# Patient Record
Sex: FEMALE | Race: BLACK OR AFRICAN AMERICAN | NOT HISPANIC OR LATINO | Employment: FULL TIME | URBAN - METROPOLITAN AREA
[De-identification: names, ages, dates, MRNs, and addresses within clinical notes are randomized per-mention and may not be internally consistent; named-entity substitution may affect disease eponyms.]

---

## 2019-01-05 ENCOUNTER — APPOINTMENT (EMERGENCY)
Dept: RADIOLOGY | Facility: HOSPITAL | Age: 24
End: 2019-01-05

## 2019-01-05 ENCOUNTER — HOSPITAL ENCOUNTER (EMERGENCY)
Facility: HOSPITAL | Age: 24
Discharge: HOME | End: 2019-01-05

## 2019-01-05 VITALS
TEMPERATURE: 98.1 F | BODY MASS INDEX: 27.31 KG/M2 | SYSTOLIC BLOOD PRESSURE: 109 MMHG | RESPIRATION RATE: 17 BRPM | DIASTOLIC BLOOD PRESSURE: 66 MMHG | HEART RATE: 94 BPM | WEIGHT: 160 LBS | HEIGHT: 64 IN | OXYGEN SATURATION: 100 %

## 2019-01-05 DIAGNOSIS — T59.811A SMOKE INHALATION: Primary | ICD-10-CM

## 2019-01-05 LAB
ANION GAP SERPL CALCULATED.3IONS-SCNC: 9 MMOL/L (ref 4–13)
BASE EX.OXY STD BLDV CALC-SCNC: 79.7 % (ref 60–80)
BASE EXCESS BLDV CALC-SCNC: -1.1 MMOL/L
BASOPHILS # BLD AUTO: 0.07 THOUSANDS/ΜL (ref 0–0.1)
BASOPHILS NFR BLD AUTO: 1 % (ref 0–1)
BUN SERPL-MCNC: 12 MG/DL (ref 5–25)
CALCIUM SERPL-MCNC: 9.3 MG/DL (ref 8.3–10.1)
CHLORIDE SERPL-SCNC: 105 MMOL/L (ref 100–108)
CO2 SERPL-SCNC: 26 MMOL/L (ref 21–32)
CREAT SERPL-MCNC: 0.92 MG/DL (ref 0.6–1.3)
EOSINOPHIL # BLD AUTO: 0.15 THOUSAND/ΜL (ref 0–0.61)
EOSINOPHIL NFR BLD AUTO: 2 % (ref 0–6)
ERYTHROCYTE [DISTWIDTH] IN BLOOD BY AUTOMATED COUNT: 15.4 % (ref 11.6–15.1)
GAS + CO PNL BLDA: 0.8 % (ref 0–1.5)
GFR SERPL CREATININE-BSD FRML MDRD: 100 ML/MIN/1.73SQ M
GLUCOSE SERPL-MCNC: 103 MG/DL (ref 65–140)
HCO3 BLDV-SCNC: 24.5 MMOL/L (ref 24–30)
HCT VFR BLD AUTO: 33.4 % (ref 34.8–46.1)
HGB BLD-MCNC: 11.1 G/DL (ref 11.5–15.4)
IMM GRANULOCYTES # BLD AUTO: 0.01 THOUSAND/UL (ref 0–0.2)
IMM GRANULOCYTES NFR BLD AUTO: 0 % (ref 0–2)
LYMPHOCYTES # BLD AUTO: 3.15 THOUSANDS/ΜL (ref 0.6–4.47)
LYMPHOCYTES NFR BLD AUTO: 48 % (ref 14–44)
MCH RBC QN AUTO: 26.1 PG (ref 26.8–34.3)
MCHC RBC AUTO-ENTMCNC: 33.2 G/DL (ref 31.4–37.4)
MCV RBC AUTO: 79 FL (ref 82–98)
MONOCYTES # BLD AUTO: 0.45 THOUSAND/ΜL (ref 0.17–1.22)
MONOCYTES NFR BLD AUTO: 7 % (ref 4–12)
NEUTROPHILS # BLD AUTO: 2.82 THOUSANDS/ΜL (ref 1.85–7.62)
NEUTS SEG NFR BLD AUTO: 42 % (ref 43–75)
NRBC BLD AUTO-RTO: 0 /100 WBCS
O2 CT BLDV-SCNC: 13 ML/DL
PCO2 BLDV: 44.8 MM HG (ref 42–50)
PH BLDV: 7.36 [PH] (ref 7.3–7.4)
PLATELET # BLD AUTO: 279 THOUSANDS/UL (ref 149–390)
PMV BLD AUTO: 10.5 FL (ref 8.9–12.7)
PO2 BLDV: 44.9 MM HG (ref 35–45)
POTASSIUM SERPL-SCNC: 3.6 MMOL/L (ref 3.5–5.3)
RBC # BLD AUTO: 4.25 MILLION/UL (ref 3.81–5.12)
SODIUM SERPL-SCNC: 140 MMOL/L (ref 136–145)
WBC # BLD AUTO: 6.65 THOUSAND/UL (ref 4.31–10.16)

## 2019-01-05 PROCEDURE — 71046 X-RAY EXAM CHEST 2 VIEWS: CPT

## 2019-01-05 PROCEDURE — 85025 COMPLETE CBC W/AUTO DIFF WBC: CPT | Performed by: PHYSICIAN ASSISTANT

## 2019-01-05 PROCEDURE — 99285 EMERGENCY DEPT VISIT HI MDM: CPT

## 2019-01-05 PROCEDURE — 82375 ASSAY CARBOXYHB QUANT: CPT | Performed by: PHYSICIAN ASSISTANT

## 2019-01-05 PROCEDURE — 36415 COLL VENOUS BLD VENIPUNCTURE: CPT | Performed by: PHYSICIAN ASSISTANT

## 2019-01-05 PROCEDURE — 82805 BLOOD GASES W/O2 SATURATION: CPT | Performed by: PHYSICIAN ASSISTANT

## 2019-01-05 PROCEDURE — 80048 BASIC METABOLIC PNL TOTAL CA: CPT | Performed by: PHYSICIAN ASSISTANT

## 2019-01-05 NOTE — DISCHARGE INSTRUCTIONS
Smoke Inhalation   WHAT YOU NEED TO KNOW:   Smoke inhalation is when you breathe in harmful smoke from burning materials and gases  This harmful smoke may contain chemicals or poisons, such as carbon monoxide and cyanide  When you inhale this harmful smoke, your lungs and airways may become irritated, swollen, and blocked  The damaged airways and lungs prevent oxygen from getting into your blood, and respiratory failure may develop  Respiratory failure means you cannot breathe well enough to get oxygen to the cells of your body  DISCHARGE INSTRUCTIONS:   Medicines:   · Bronchodilators: You may need bronchodilators to help open the air passages in your lungs, and help you breathe more easily  · Take your medicine as directed  Contact your healthcare provider if you think your medicine is not helping or if you have side effects  Tell him or her if you are allergic to any medicine  Keep a list of the medicines, vitamins, and herbs you take  Include the amounts, and when and why you take them  Bring the list or the pill bottles to follow-up visits  Carry your medicine list with you in case of an emergency  Follow up with your healthcare provider as directed:  Write down your questions so you remember to ask them during your visits  Self-care:   · Breathing exercises: You may feel short of breath when you are active  The following are breathing exercises that may help you breathe more easily:    ¨ Breathe out with pursed or puckered lips (like playing the trumpet)  ¨ Breathe using your diaphragm  Put one hand on your abdomen and breathe in, causing your hand to move outward or upward  Your lungs will have more room to get bigger and to take in more air  · Deep breathing: This exercise should be done once an hour to keep you from getting a lung infection  Deep breathing opens the tubes going to your lungs  Slowly take a deep breath and hold the breath as long as you can  Then let out your breath   Take 10 deep breaths in a row every hour while awake  You may be asked to use an incentive spirometer to help you with this  Put the plastic piece into your mouth and slowly take a breath as deep and as long as you can  Hold your breath as long as you can  Then, let out your breath  · Oxygen: You may need extra oxygen to help you breathe easier  It may be given through a plastic mask over your mouth and nose  It may be given through a nasal cannula, or prongs, instead of a mask  A nasal cannula is a pair of short, thin tubes that rest just inside your nose  Tell your caregiver if your nose gets dry or if you get redness or sores on your skin  Never smoke or let anyone else smoke in the same room while your oxygen is on  Doing so may cause a fire  · Special positions while sleeping: You may have trouble breathing when lying down  Sleeping in a position with your upper body raised may help you breathe easier  You can use foam wedges or elevate the head of your bed  There are many devices that you can buy to help raise your upper body while in bed  Use a device that will tilt your whole body, or bend your body at the waist  The device should not bend your body at the upper back or neck  Prevent smoke inhalation:   · To prevent fires, make sure that electrical wiring, chimneys, wood stoves, and space heaters are working properly  Use flammable liquids safely and store them in a locked area out of the reach of children  · Do not leave lit cigarettes unattended, and discard them properly  Keep cigarette lighters and matches in a safe place where children cannot reach them  · Make an escape plan in case a fire breaks out in your home  Practice it often with your family  Crawl on the floor to escape a burning building  The air will be cooler and clearer  · Use smoke detectors in your house, and check them regularly to make sure they are working  Contact your healthcare provider if:   · You have a fever  · You have questions or concerns about your condition or care  Seek care immediately or call 911 if:   · You cough up or vomit blood  · You have a fast heartbeat and chest pain  · You have increased shortness of breath  · You have weakness, and pale and clammy skin  · You have wheezing  · Your lips or fingernails turn blue  © 2017 [employer] Information is for End User's use only and may not be sold, redistributed or otherwise used for commercial purposes  All illustrations and images included in CareNotes® are the copyrighted property of [employer]  or [employer]  The above information is an  only  It is not intended as medical advice for individual conditions or treatments  Talk to your doctor, nurse or pharmacist before following any medical regimen to see if it is safe and effective for you  Smoke Inhalation   WHAT YOU NEED TO KNOW:   What is smoke inhalation? Smoke inhalation is when you breathe in harmful smoke from burning materials and gases  This harmful smoke may contain chemicals or poisons, such as carbon monoxide and cyanide  When you inhale this harmful smoke, your lungs and airway may become irritated, swollen, and blocked  The damaged airway and lungs prevent oxygen from getting into your blood, and respiratory failure may develop  Respiratory failure means you cannot breathe well enough to get oxygen to the cells of your body  What causes smoke inhalation? Smoke inhalation most commonly happens when you get trapped inside a burning structure, such as a house, office building, or factory  The harmful chemicals found in smoke may come from burning rubber, coal, plastic, or electrical wiring  Smoke inhalation may also happen if you are near a burning forest   What are the signs and symptoms of smoke inhalation?   The signs and symptoms of smoke inhalation depend on the source of the smoke and how long you were exposed to the smoke:  · Cough and hoarseness    · Chest pain or coughing up blood    · Trouble breathing, such as shortness of breath and noisy breathing    · Headache, abdominal pain, and nausea     · Eye irritation or vision problems    · Fainting    · Soot in your nostrils or throat  How is smoke inhalation diagnosed? Healthcare providers will ask you about the source of the smoke that you inhaled  They will also ask about the amount of time that you were exposed to the smoke  You may need any of the following:  · Blood gases: This is also called an arterial blood gas, or ABG  Blood is taken from an artery (blood vessel) in your wrist, arm, or groin  Your blood is tested for the amount of oxygen and carbon dioxide in it  The results can tell caregivers how well your lungs are working  · Blood tests: You may need blood taken to give caregivers information about how your body is working  The blood may be taken from your hand, arm, or IV  · Bronchoscopy: This is a procedure to look inside your airway and learn the cause of your airway or lung condition  A bronchoscope (thin tube with a light) is inserted into your mouth and moved down your throat to your airway  You may be given medicine to numb your throat and help you relax during the procedure  Tissue and fluid may be collected from your airway or lungs to be tested  · Chest x-ray:  Healthcare providers may use x-rays to look for lung damage and signs of infection, such as pneumonia  · Pulmonary function tests:  Pulmonary function tests (PFTs) help caregivers learn how well your body uses oxygen  You breathe into a mouthpiece connected to a machine  The machine measures how much air you breathe in and out over a certain amount of time  PFTs help your caregivers decide the best treatment for you  · V/Q scan: This is a ventilation (V) and perfusion (Q) test  This test is also called a  scan   A V/Q scan is a two-part test in which pictures of your lungs are taken to look for certain lung problems  During the perfusion part of the test, radioactive dye is put into your vein  Your blood carries the dye to the blood vessels in your lungs  Pictures are taken to see how blood flows in your lungs  During the ventilation part of the test, you breathe in a special gas  Pictures are taken to see how well your lungs take in oxygen  How is smoke inhalation treated? · Antidotes: These are substances that may stop or control the effects of the smoke you inhaled  Healthcare providers may give different antidotes depending on the type of smoke you inhaled  · Bronchodilators: You may need bronchodilators to help open the air passages in your lungs, and help you breathe more easily  · Steroids: Steroid medicine may help to open your air passages so you can breathe easier  · Antibiotics: This medicine is given to help treat or prevent an infection caused by bacteria  · Medicines to treat pain, swelling, or fever: These medicines are safe for most people to use  However, they can cause serious problems when used by people with certain medical conditions  Tell caregivers if you have liver or kidney disease or a history of bleeding in your stomach  What are the risks of smoke inhalation? Smoke inhalation is a serious injury, and treatment is needed as soon as possible  If it is not treated early, the smoke may damage your lungs and cause breathing problems  Your lungs may become infected, swollen, and filled with fluid  Fluid in the lungs causes severe shortness of breath and may lead to respiratory failure  This may affect your heart and brain, and it may be life-threatening  Even with treatment, you may have permanent lung damage  How can smoke inhalation be prevented? · To prevent fires, make sure that electrical wiring, chimneys, wood stoves, and space heaters are working properly   Use flammable liquids safely and store them in a locked area out of the reach of children  · Do not leave lit cigarettes unattended, and discard them properly  Keep cigarette lighters and matches in a safe place where children cannot reach them  · Make an escape plan in case a fire breaks out in your home  Practice it often with your family  Crawl on the floor to escape a burning building  The air will be cooler and   · Use smoke detectors in your house, and check them regularly to make sure they are working  When should I contact my healthcare provider? · You have a fever  · You have questions or concerns about your condition or care  When should I seek immediate care or call 911? · You cough up or vomit blood  · You have a fast heartbeat and chest pain  · You have increased shortness of breath  · You have weakness, and pale and clammy skin  · You are wheezing  · Your lips or fingernails turn blue  CARE AGREEMENT:   You have the right to help plan your care  Learn about your health condition and how it may be treated  Discuss treatment options with your caregivers to decide what care you want to receive  You always have the right to refuse treatment  The above information is an  only  It is not intended as medical advice for individual conditions or treatments  Talk to your doctor, nurse or pharmacist before following any medical regimen to see if it is safe and effective for you  © 2017 [employer] Information is for End User's use only and may not be sold, redistributed or otherwise used for commercial purposes  All illustrations and images included in CareNotes® are the copyrighted property of [employer]  or [employer]
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
## 2019-05-20 ENCOUNTER — HOSPITAL ENCOUNTER (EMERGENCY)
Facility: HOSPITAL | Age: 24
Discharge: HOME | End: 2019-05-20
Admitting: EMERGENCY MEDICINE
Payer: COMMERCIAL

## 2019-05-20 VITALS
HEIGHT: 65 IN | TEMPERATURE: 98.6 F | BODY MASS INDEX: 25.64 KG/M2 | HEART RATE: 71 BPM | RESPIRATION RATE: 18 BRPM | WEIGHT: 153.88 LBS | OXYGEN SATURATION: 98 % | DIASTOLIC BLOOD PRESSURE: 55 MMHG | SYSTOLIC BLOOD PRESSURE: 136 MMHG

## 2019-05-20 DIAGNOSIS — M79.89 LEG SWELLING: ICD-10-CM

## 2019-05-20 DIAGNOSIS — N39.0 UTI (URINARY TRACT INFECTION): ICD-10-CM

## 2019-05-20 DIAGNOSIS — E87.6 HYPOKALEMIA: Primary | ICD-10-CM

## 2019-05-20 LAB
ANION GAP BLD CALC-SCNC: 17 MMOL/L (ref 4–13)
BACTERIA UR QL AUTO: ABNORMAL /HPF
BILIRUB UR QL STRIP: NEGATIVE
BUN BLD-MCNC: 7 MG/DL (ref 5–25)
CA-I BLD-SCNC: 1.15 MMOL/L (ref 1.12–1.32)
CHLORIDE BLD-SCNC: 103 MMOL/L (ref 100–108)
CLARITY UR: ABNORMAL
COLOR UR: YELLOW
CREAT BLD-MCNC: 0.7 MG/DL (ref 0.6–1.3)
EXT PREG TEST URINE: NEGATIVE
GFR SERPL CREATININE-BSD FRML MDRD: 139 ML/MIN/1.73SQ M
GLUCOSE SERPL-MCNC: 92 MG/DL (ref 65–140)
GLUCOSE UR STRIP-MCNC: NEGATIVE MG/DL
HCT VFR BLD CALC: 35 % (ref 34.8–46.1)
HGB BLDA-MCNC: 11.9 G/DL (ref 11.5–15.4)
HGB UR QL STRIP.AUTO: ABNORMAL
KETONES UR STRIP-MCNC: NEGATIVE MG/DL
LEUKOCYTE ESTERASE UR QL STRIP: ABNORMAL
NITRITE UR QL STRIP: POSITIVE
NON-SQ EPI CELLS URNS QL MICRO: ABNORMAL /HPF
PCO2 BLD: 26 MMOL/L (ref 21–32)
PH UR STRIP.AUTO: 6 [PH]
POTASSIUM BLD-SCNC: 3.1 MMOL/L (ref 3.5–5.3)
PROT UR STRIP-MCNC: NEGATIVE MG/DL
RBC #/AREA URNS AUTO: ABNORMAL /HPF
SODIUM BLD-SCNC: 142 MMOL/L (ref 136–145)
SP GR UR STRIP.AUTO: 1.02 (ref 1–1.03)
SPECIMEN SOURCE: ABNORMAL
UROBILINOGEN UR QL STRIP.AUTO: 0.2 E.U./DL
WBC #/AREA URNS AUTO: ABNORMAL /HPF

## 2019-05-20 PROCEDURE — 87186 SC STD MICRODIL/AGAR DIL: CPT | Performed by: PHYSICIAN ASSISTANT

## 2019-05-20 PROCEDURE — 80047 BASIC METABLC PNL IONIZED CA: CPT

## 2019-05-20 PROCEDURE — 99283 EMERGENCY DEPT VISIT LOW MDM: CPT

## 2019-05-20 PROCEDURE — 99283 EMERGENCY DEPT VISIT LOW MDM: CPT | Performed by: PHYSICIAN ASSISTANT

## 2019-05-20 PROCEDURE — 87086 URINE CULTURE/COLONY COUNT: CPT | Performed by: PHYSICIAN ASSISTANT

## 2019-05-20 PROCEDURE — 85014 HEMATOCRIT: CPT

## 2019-05-20 PROCEDURE — 81025 URINE PREGNANCY TEST: CPT | Performed by: PHYSICIAN ASSISTANT

## 2019-05-20 PROCEDURE — 87077 CULTURE AEROBIC IDENTIFY: CPT | Performed by: PHYSICIAN ASSISTANT

## 2019-05-20 PROCEDURE — 81001 URINALYSIS AUTO W/SCOPE: CPT | Performed by: PHYSICIAN ASSISTANT

## 2019-05-20 RX ORDER — POTASSIUM CHLORIDE 20 MEQ/1
40 TABLET, EXTENDED RELEASE ORAL ONCE
Status: COMPLETED | OUTPATIENT
Start: 2019-05-20 | End: 2019-05-20

## 2019-05-20 RX ORDER — CEPHALEXIN 500 MG/1
500 CAPSULE ORAL EVERY 12 HOURS SCHEDULED
Qty: 14 CAPSULE | Refills: 0 | Status: SHIPPED | OUTPATIENT
Start: 2019-05-20 | End: 2019-05-27

## 2019-05-20 RX ADMIN — POTASSIUM CHLORIDE 40 MEQ: 1500 TABLET, EXTENDED RELEASE ORAL at 17:49

## 2019-05-22 LAB — BACTERIA UR CULT: ABNORMAL

## 2019-10-09 ENCOUNTER — OFFICE VISIT (OUTPATIENT)
Dept: OBGYN CLINIC | Age: 24
End: 2019-10-09
Payer: COMMERCIAL

## 2019-10-09 VITALS
BODY MASS INDEX: 25.99 KG/M2 | HEIGHT: 65 IN | WEIGHT: 156 LBS | SYSTOLIC BLOOD PRESSURE: 112 MMHG | DIASTOLIC BLOOD PRESSURE: 64 MMHG

## 2019-10-09 DIAGNOSIS — N91.1 AMENORRHEA, SECONDARY: Primary | ICD-10-CM

## 2019-10-09 PROCEDURE — 76817 TRANSVAGINAL US OBSTETRIC: CPT | Performed by: STUDENT IN AN ORGANIZED HEALTH CARE EDUCATION/TRAINING PROGRAM

## 2019-10-09 PROCEDURE — 99202 OFFICE O/P NEW SF 15 MIN: CPT | Performed by: STUDENT IN AN ORGANIZED HEALTH CARE EDUCATION/TRAINING PROGRAM
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
## 2019-10-31 ENCOUNTER — HOSPITAL ENCOUNTER (EMERGENCY)
Facility: HOSPITAL | Age: 24
Discharge: HOME | End: 2019-10-31
Attending: EMERGENCY MEDICINE
Payer: COMMERCIAL

## 2019-10-31 VITALS
SYSTOLIC BLOOD PRESSURE: 104 MMHG | WEIGHT: 156.09 LBS | BODY MASS INDEX: 25.97 KG/M2 | TEMPERATURE: 98.5 F | OXYGEN SATURATION: 98 % | HEART RATE: 95 BPM | RESPIRATION RATE: 16 BRPM | DIASTOLIC BLOOD PRESSURE: 64 MMHG

## 2019-10-31 DIAGNOSIS — N39.0 UTI (URINARY TRACT INFECTION): Primary | ICD-10-CM

## 2019-10-31 LAB
BACTERIA UR QL AUTO: ABNORMAL /HPF
BILIRUB UR QL STRIP: NEGATIVE
CLARITY UR: CLEAR
COLOR UR: YELLOW
GLUCOSE UR STRIP-MCNC: NEGATIVE MG/DL
HGB UR QL STRIP.AUTO: NEGATIVE
KETONES UR STRIP-MCNC: NEGATIVE MG/DL
LEUKOCYTE ESTERASE UR QL STRIP: ABNORMAL
NITRITE UR QL STRIP: NEGATIVE
NON-SQ EPI CELLS URNS QL MICRO: ABNORMAL /HPF
PH UR STRIP.AUTO: 6 [PH]
PROT UR STRIP-MCNC: NEGATIVE MG/DL
RBC #/AREA URNS AUTO: ABNORMAL /HPF
SP GR UR STRIP.AUTO: <=1.005 (ref 1–1.03)
UROBILINOGEN UR QL STRIP.AUTO: 1 E.U./DL
WBC #/AREA URNS AUTO: ABNORMAL /HPF

## 2019-10-31 PROCEDURE — 99283 EMERGENCY DEPT VISIT LOW MDM: CPT

## 2019-10-31 PROCEDURE — 87077 CULTURE AEROBIC IDENTIFY: CPT | Performed by: EMERGENCY MEDICINE

## 2019-10-31 PROCEDURE — 99283 EMERGENCY DEPT VISIT LOW MDM: CPT | Performed by: EMERGENCY MEDICINE

## 2019-10-31 PROCEDURE — 87186 SC STD MICRODIL/AGAR DIL: CPT | Performed by: EMERGENCY MEDICINE

## 2019-10-31 PROCEDURE — 87086 URINE CULTURE/COLONY COUNT: CPT | Performed by: EMERGENCY MEDICINE

## 2019-10-31 PROCEDURE — 81001 URINALYSIS AUTO W/SCOPE: CPT | Performed by: EMERGENCY MEDICINE

## 2019-10-31 RX ORDER — NITROFURANTOIN 25; 75 MG/1; MG/1
100 CAPSULE ORAL 2 TIMES DAILY
Qty: 10 CAPSULE | Refills: 0 | Status: SHIPPED | OUTPATIENT
Start: 2019-10-31 | End: 2019-11-05

## 2019-10-31 NOTE — ED PROVIDER NOTES
History  Chief Complaint   Patient presents with    Possible UTI     pt with UTI, was treated at [place] [address] but not feeling any better  Pt is approx 12 weeks pregnant      To usual sterile fashion to the emergency depart for evaluation of cloudy urine and some mild dysuria  Patient is about 12 weeks pregnant  Patient was treated about 3 weeks ago with urinary tract infection but this that the symptoms number really fully resolved  Patient was treated with what she believes is Keflex which has a history of resistance to in the past   Patient does follow with OBGYN has not had a appointment yet due to her early gestation  She has no fevers chills, no flank pain  No cramping abdominal pain  No vaginal discharge or bleeding  No pelvic cramping  None       Past Medical History:   Diagnosis Date    Anxiety     Depression     History of blood transfusion        History reviewed  No pertinent surgical history  Family History   Problem Relation Age of Onset    No Known Problems Mother     No Known Problems Father      I have reviewed and agree with the history as documented  Social History     Tobacco Use    Smoking status: Never Smoker    Smokeless tobacco: Never Used   Substance Use Topics    Alcohol use: Not Currently     Comment: socially    Drug use: No        Review of Systems   Constitutional: Negative for appetite change, chills, fatigue and fever  HENT: Negative for sneezing and sore throat  Eyes: Negative for visual disturbance  Respiratory: Negative for cough, choking, chest tightness, shortness of breath and wheezing  Cardiovascular: Negative for chest pain and palpitations  Gastrointestinal: Negative for abdominal pain, constipation, diarrhea, nausea and vomiting  Genitourinary: Positive for dysuria  Negative for difficulty urinating  Neurological: Negative for dizziness, weakness, light-headedness, numbness and headaches     All other systems reviewed and are negative  Physical Exam  Physical Exam   Constitutional: She is oriented to person, place, and time  She appears well-developed and well-nourished  No distress  HENT:   Head: Normocephalic and atraumatic  Mouth/Throat: Oropharynx is clear and moist    Eyes: Pupils are equal, round, and reactive to light  EOM are normal    Neck: No JVD present  No tracheal deviation present  Cardiovascular: Normal rate, regular rhythm, normal heart sounds and intact distal pulses  Exam reveals no gallop and no friction rub  No murmur heard  Pulmonary/Chest: Effort normal and breath sounds normal  No respiratory distress  She has no wheezes  She has no rales  Abdominal: Soft  Bowel sounds are normal  She exhibits no distension  There is tenderness in the suprapubic area  There is no rebound and no guarding  Neurological: She is alert and oriented to person, place, and time  No cranial nerve deficit  She exhibits normal muscle tone  Skin: Skin is warm and dry  She is not diaphoretic  No pallor  Psychiatric: She has a normal mood and affect  Her behavior is normal    Nursing note and vitals reviewed        Vital Signs  ED Triage Vitals [10/31/19 0924]   Temperature Pulse Respirations Blood Pressure SpO2   98 5 °F (36 9 °C) 95 16 104/64 98 %      Temp Source Heart Rate Source Patient Position - Orthostatic VS BP Location FiO2 (%)   Oral Monitor Sitting Right arm --      Pain Score       8           Vitals:    10/31/19 0924   BP: 104/64   Pulse: 95   Patient Position - Orthostatic VS: Sitting         Visual Acuity      ED Medications  Medications - No data to display    Diagnostic Studies  Results Reviewed     Procedure Component Value Units Date/Time    Urine Microscopic [[id number]]  (Abnormal) Collected:  10/31/19 0957    Lab Status:  Final result Specimen:  Urine, Clean Catch Updated:  10/31/19 1042     RBC, UA 1-2 /hpf      WBC, UA 2-4 /hpf      Epithelial Cells Occasional /hpf      Bacteria, UA Moderate /hpf URINE COMMENT --    UA w Reflex to Microscopic w Reflex to Culture [[id number]]  (Abnormal) Collected:  10/31/19 0957    Lab Status:  Final result Specimen:  Urine, Clean Catch Updated:  10/31/19 1017     Color, UA Yellow     Clarity, UA Clear     Specific Gravity, UA <=1 005     pH, UA 6 0     Leukocytes, UA Small     Nitrite, UA Negative     Protein, UA Negative mg/dl      Glucose, UA Negative mg/dl      Ketones, UA Negative mg/dl      Urobilinogen, UA 1 0 E U /dl      Bilirubin, UA Negative     Blood, UA Negative     URINE COMMENT --    Urine culture [[id number]] Collected:  10/31/19 0957    Lab Status: In process Specimen:  Urine, Clean Catch Updated:  10/31/19 1017                 No orders to display              Procedures  Procedures       ED Course  ED Course as of Oct 31 1151   Thu Oct 31, 2019   1114 Will treat for bacteria  Fetal heart tones are 160 beats per minute with gross fetal activity noted on fetal ultrasound  MDM  Number of Diagnoses or Management Options  Diagnosis management comments: 57-year-old female with symptomatic bacteria, will treat with Macrobid given the fact Keflex was ineffective  Will refer to OBGYN for further management  Fetal heart tones are reassuring  Disposition  Final diagnoses:   UTI (urinary tract infection)     Time reflects when diagnosis was documented in both MDM as applicable and the Disposition within this note     Time User Action Codes Description Comment    10/31/2019 11:50 AM Stefan Holliday Add [N39 0] UTI (urinary tract infection)       ED Disposition     ED Disposition Condition Date/Time Comment    Discharge Stable Thu Oct 31, 2019 11:50 AM Sridhar Sharif discharge to home/self care              Follow-up Information    None         Patient's Medications   Discharge Prescriptions    NITROFURANTOIN (MACROBID) 100 MG CAPSULE    Take 1 capsule (100 mg total) by mouth 2 (two) times a day for 5 days       Start Date: 10/31/2019End Date: 11/5/2019       Order Dose: 100 mg       Quantity: 10 capsule    Refills: 0     No discharge procedures on file      ED Provider  Electronically Signed by           Will Esquivel MD  10/31/19 5441

## 2019-10-31 NOTE — ED NOTES
Patient discharged by provider  Patient ambulated out of department, steady gait, vss        Frank Booker RN  10/31/19 1200

## 2019-11-02 LAB — BACTERIA UR CULT: ABNORMAL

## 2019-11-11 ENCOUNTER — TELEPHONE (OUTPATIENT)
Dept: CARDIOLOGY CLINIC | Facility: CLINIC | Age: 24
End: 2019-11-11

## 2019-11-11 ENCOUNTER — ROUTINE PRENATAL (OUTPATIENT)
Dept: OBGYN CLINIC | Age: 24
End: 2019-11-11
Payer: COMMERCIAL

## 2019-11-11 ENCOUNTER — INITIAL PRENATAL (OUTPATIENT)
Dept: OBGYN CLINIC | Age: 24
End: 2019-11-11
Payer: COMMERCIAL

## 2019-11-11 VITALS — DIASTOLIC BLOOD PRESSURE: 78 MMHG | SYSTOLIC BLOOD PRESSURE: 102 MMHG | WEIGHT: 145 LBS | BODY MASS INDEX: 24.13 KG/M2

## 2019-11-11 VITALS
BODY MASS INDEX: 23.82 KG/M2 | HEART RATE: 78 BPM | WEIGHT: 143 LBS | HEIGHT: 65 IN | SYSTOLIC BLOOD PRESSURE: 102 MMHG | DIASTOLIC BLOOD PRESSURE: 64 MMHG

## 2019-11-11 DIAGNOSIS — E63.9 INADEQUATE DIETARY INTAKE: ICD-10-CM

## 2019-11-11 DIAGNOSIS — I05.9: ICD-10-CM

## 2019-11-11 DIAGNOSIS — Z11.3 SCREENING FOR STD (SEXUALLY TRANSMITTED DISEASE): ICD-10-CM

## 2019-11-11 DIAGNOSIS — Z34.81 PRENATAL CARE, SUBSEQUENT PREGNANCY, FIRST TRIMESTER: Primary | ICD-10-CM

## 2019-11-11 DIAGNOSIS — O09.899 HISTORY OF PRETERM DELIVERY, CURRENTLY PREGNANT: ICD-10-CM

## 2019-11-11 DIAGNOSIS — Z34.80 SUPERVISION OF OTHER NORMAL PREGNANCY, ANTEPARTUM: Primary | ICD-10-CM

## 2019-11-11 DIAGNOSIS — O99.411: ICD-10-CM

## 2019-11-11 PROCEDURE — 99213 OFFICE O/P EST LOW 20 MIN: CPT | Performed by: NURSE PRACTITIONER

## 2019-11-11 PROCEDURE — 87591 N.GONORRHOEAE DNA AMP PROB: CPT | Performed by: NURSE PRACTITIONER

## 2019-11-11 PROCEDURE — 87491 CHLMYD TRACH DNA AMP PROBE: CPT | Performed by: NURSE PRACTITIONER

## 2019-11-11 PROCEDURE — G0145 SCR C/V CYTO,THINLAYER,RESCR: HCPCS | Performed by: NURSE PRACTITIONER
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
## 2019-11-11 NOTE — TELEPHONE ENCOUNTER
Please advise, thanks  There is an Echo in care everywhere form 2016  I do no see anything more recently

## 2019-11-11 NOTE — PATIENT INSTRUCTIONS
Pregnancy at [address] to [address]:   What changes are happening in my body? You are now at the end of your first trimester and entering your second trimester  Morning sickness usually goes away by this time  You may have other symptoms such as fatigue, frequent urination, and headaches  You may have gained between 2 to 4 pounds by now  How do I care for myself at this stage of my pregnancy? · Get plenty of rest   You may feel more tired than normal  You may need to take naps or go to bed earlier  · Manage nausea and vomiting  Avoid fatty and spicy foods  Eat small meals throughout the day instead of large meals  Sana may help to decrease nausea  Ask your healthcare provider about other ways of decreasing nausea and vomiting  · Eat a variety of healthy foods  Healthy foods include fruits, vegetables, whole-grain breads, low-fat dairy foods, beans, lean meats, and fish  Drink liquids as directed  Ask how much liquid to drink each day and which liquids are best for you  Limit caffeine to less than 200 milligrams each day  Limit your intake of fish to 2 servings each week  Choose fish low in mercury such as canned light tuna, shrimp, salmon, cod, or tilapia  Do not  eat fish high in mercury such as swordfish, tilefish, aranza mackerel, and shark  · Take prenatal vitamins as directed  Your need for certain vitamins and minerals, such as folic acid, increases during pregnancy  Prenatal vitamins provide some of the extra vitamins and minerals you need  Prenatal vitamins may also help to decrease the risk of certain birth defects  · Do not smoke  If you smoke, it is never too late to quit  Smoking increases your risk of a miscarriage and other health problems during your pregnancy  Smoking can cause your baby to be born too early or weigh less at birth  Ask your healthcare provider for information if you need help quitting  · Do not drink alcohol    Alcohol passes from your body to your baby through the placenta  It can affect your baby's brain development and cause fetal alcohol syndrome (FAS)  FAS is a group of conditions that causes mental, behavior, and growth problems  · Talk to your healthcare provider before you take any medicines  Many medicines may harm your baby if you take them when you are pregnant  Do not take any medicines, vitamins, herbs, or supplements without first talking to your healthcare provider  Never use illegal or street drugs (such as marijuana or cocaine) while you are pregnant  What are some safety tips during pregnancy? · Avoid hot tubs and saunas  Do not use a hot tub or sauna while you are pregnant, especially during your first trimester  Hot tubs and saunas may raise your baby's temperature and increase the risk of birth defects  · Avoid toxoplasmosis  This is an infection caused by eating raw meat or being around infected cat feces  It can cause birth defects, miscarriages, and other problems  Wash your hands after you touch raw meat  Make sure any meat is well-cooked before you eat it  Avoid raw eggs and unpasteurized milk  Use gloves or ask someone else to clean your cat's litter box while you are pregnant  What changes are happening with my baby? Your baby has fully formed fingernails and toenails  Your baby's heartbeat can now be heard  Ask your healthcare provider if you can listen to your baby's heartbeat  By week 14, your baby is over 4 inches long from the top of the head to the rump (baby's bottom)  Your baby weighs over 3 ounces  What do I need to know about prenatal care? During the first 28 weeks of your pregnancy, you will see your healthcare provider once a month  Prenatal care can help prevent problems during pregnancy and childbirth  Your healthcare provider will check your blood pressure and weight  You may also need any of the following:  · A urine test  may also be done to check for sugar and protein   These can be signs of gestational diabetes or infection  · Genetic disorders screening tests  may be offered to you  This screening test checks your baby's risk of genetic disorders such as Down syndrome  The screening test includes a blood test and ultrasound  · Your baby's heart rate  will be checked  When should I seek immediate care? · You have pain or cramping in your abdomen or low back  · You have heavy vaginal bleeding or clotting  · You pass material that looks like tissue or large clots  Collect the material and bring it with you  When should I contact my healthcare provider? · You cannot keep food or drinks down, and you are losing weight  · You have light bleeding  · You have chills or a fever  · You have vaginal itching, burning, or pain  · You have yellow, green, white, or foul-smelling vaginal discharge  · You have pain or burning when you urinate, less urine than usual, or pink or bloody urine  · You have questions or concerns about your condition or care  CARE AGREEMENT:   You have the right to help plan your care  Learn about your health condition and how it may be treated  Discuss treatment options with your caregivers to decide what care you want to receive  You always have the right to refuse treatment  The above information is an  only  It is not intended as medical advice for individual conditions or treatments  Talk to your doctor, nurse or pharmacist before following any medical regimen to see if it is safe and effective for you  © 2017 [employer] Information is for End User's use only and may not be sold, redistributed or otherwise used for commercial purposes  All illustrations and images included in CareNotes® are the copyrighted property of [employer]  or [employer]

## 2019-11-11 NOTE — TELEPHONE ENCOUNTER
Pt was seen today in OBGYN and was referred to Dr Trina Sheehan to be seen ASAP for O99 411, I05 9 (ICD-10-CM) - Mitral valve disorder in pregnancy in first trimester, pt will be a new pt  Please Advise if your able to see pt

## 2019-11-11 NOTE — PROGRESS NOTES
Inadequate dietary intake  She states that she was taking apetamin syrup for weight gain and to increase her appetite  Before pregnancy and stopped when she found out  Reviewed plan for keeping up with small frequent meals, healthy choices, can send referral for nutrition if needed, pt declined today  When she eats, she does keep meals down, but does not have desire or appetite to eat she states  History of  delivery, currently pregnant  Patient had delivery at 32 weeks after PPROM  Referral for MFM given    Mitral valve prolapse of mother during pregnancy  Cardiology referral given  Is not currently under the care of cardiology she states

## 2019-11-11 NOTE — PATIENT INSTRUCTIONS
Pregnancy   AMBULATORY CARE:   What you need to know about pregnancy:  A normal pregnancy lasts about 40 weeks  The first trimester lasts from your last period through the 12th week of pregnancy  The second trimester lasts from the 13th week of your pregnancy through the 23rd week  The third trimester lasts from your 24th week of pregnancy until your baby is born  If you know the date of your last period, your healthcare provider can estimate your due date  You may give birth to your baby any time from 37 weeks to 2 weeks after your due date  Seek care immediately if:   · You develop a severe headache that does not go away  · You have new or increased vision changes, such as blurred or spotted vision  · You have new or increased swelling in your face or hands  · You have pain or cramping in your abdomen or low back  · You have vaginal bleeding  Contact your healthcare provider or obstetrician if:   · You have abdominal cramps, pressure, or tightening  · You have a change in vaginal discharge  · You cannot keep food or drinks down, and you are losing weight  · You have chills or a fever  · You have vaginal itching, burning, or pain  · You have yellow, green, white, or foul-smelling vaginal discharge  · You have pain or burning when you urinate, less urine than usual, or pink or bloody urine  · You have questions or concerns about your condition or care  Body changes that may occur during your pregnancy:   · Breast changes  you will experience include tenderness and tingling during the early part of your pregnancy  Your breasts will become larger  You may need to use a support bra  You may see a thin, yellow fluid, called colostrum, leak from your nipples during the second trimester  Colostrum is a liquid that changes to milk about 3 days after you give birth  · Skin changes and stretch marks  may occur during your pregnancy   You may have red marks, called stretch marks, on your skin  Stretch marks will usually fade after pregnancy  Use lotion if your skin is dry and itchy  The skin on your face, around your nipples, and below your belly button may darken  Most of the time, your skin will return to its normal color after your baby is born  · Morning sickness  is nausea and vomiting that can happen at any time of day  Avoid fatty and spicy foods  Eat small meals throughout the day instead of large meals  Sana may help to decrease nausea  Ask your healthcare provider about other ways of decreasing nausea and vomiting  · Heartburn  may be caused by changes in your hormones during pregnancy  Your growing uterus may also push your stomach upward and force stomach acid to back up into your esophagus  Eat 4 or 5 small meals each day instead of large meals  Avoid spicy foods  Avoid eating right before bedtime  · Constipation  may develop during your pregnancy  To treat constipation, eat foods high in fiber such as fiber cereals, beans, fruits, vegetables, whole-grain breads, and prune juice  Get regular exercise and drink plenty of water  Your healthcare provider may also suggest a fiber supplement to soften your bowel movements  Talk to your healthcare provider before you use any medicines to decrease constipation  · Hemorrhoids  are enlarged veins in the rectal area  They may cause pain, itching, and bright red bleeding from your rectum  To decrease your risk of hemorrhoids, prevent constipation and do not strain to have a bowel movement  If you have hemorrhoids, soak in a tub of warm water to ease discomfort  Ask your healthcare provider how you can treat hemorrhoids  · Leg cramps and swelling  may be caused by low calcium levels or the added weight of pregnancy  Raise your legs above the level of your heart to decrease swelling  During a leg cramp, stretch or massage the muscle that has the cramp  Heat may help decrease pain and muscle spasms   Apply heat on your muscle for 20 to 30 minutes every 2 hours for as many days as directed  · Back pain  may occur as your baby grows  Do not stand for long periods of time or lift heavy items  Use good posture while you stand, squat, or bend  Wear low-heeled shoes with good support  Rest may also help to relieve back pain  Ask your healthcare provider about exercises you can do to strengthen your back muscles  Stay healthy during your pregnancy:   · Eat a variety of healthy foods  Healthy foods include fruits, vegetables, whole-grain breads, low-fat dairy foods, beans, lean meats, and fish  Drink liquids as directed  Ask how much liquid to drink each day and which liquids are best for you  Limit caffeine to less than 200 milligrams each day  Limit your intake of fish to 2 servings each week  Choose fish low in mercury such as canned light tuna, shrimp, crab, salmon, cod, or tilapia  Do not  eat fish high in mercury such as swordfish, tilefish, aranza mackerel, and shark  · Take prenatal vitamins as directed  Your need for certain vitamins and minerals, such as folic acid, increases during pregnancy  Prenatal vitamins provide some of the extra vitamins and minerals you need  Prenatal vitamins may also help to decrease the risk of certain birth defects  · Ask how much weight you should gain during your pregnancy  Too much or too little weight gain can be unhealthy for you and your baby  · Talk to your healthcare provider about exercise  Moderate exercise can help you stay fit  Your healthcare provider will help you plan an exercise program that is safe for you during pregnancy  · Do not smoke  If you smoke, it is never too late to quit  Smoking increases your risk of a miscarriage and other health problems during your pregnancy  Smoking can cause your baby to be born too early or weigh less at birth  Ask your healthcare provider for information if you need help quitting  · Do not drink alcohol    Alcohol passes from your body to your baby through the placenta  It can affect your baby's brain development and cause fetal alcohol syndrome (FAS)  FAS is a group of conditions that causes mental, behavior, and growth problems  · Talk to your healthcare provider before you take any medicines  Many medicines may harm your baby if you take them when you are pregnant  Do not take any medicines, vitamins, herbs, or supplements without first talking to your healthcare provider  Never use illegal or street drugs (such as marijuana or cocaine) while you are pregnant  Safety tips:   · Avoid hot tubs and saunas  Do not use a hot tub or sauna while you are pregnant, especially during your first trimester  Hot tubs and saunas may raise your baby's temperature and increase the risk of birth defects  · Avoid toxoplasmosis  This is an infection caused by eating raw meat or being around infected cat feces  It can cause birth defects, miscarriages, and other problems  Wash your hands after you touch raw meat  Make sure any meat is well-cooked before you eat it  Avoid raw eggs and unpasteurized milk  Use gloves or ask someone else to clean your cat's litter box while you are pregnant  · Ask your healthcare provider about travel  The most comfortable time to travel is during the second trimester  Ask your healthcare provider if you can travel after 36 weeks  You may not be able to travel in an airplane after 36 weeks  He may also recommend that you avoid long road trips  Follow up with your healthcare provider or obstetrician as directed:  Go to all of your prenatal visits during your pregnancy  Write down your questions so you remember to ask them during your visits  © 2017 [employer] Information is for End User's use only and may not be sold, redistributed or otherwise used for commercial purposes   All illustrations and images included in CareNotes® are the copyrighted property of [employer] M [employer]  or [employer]  The above information is an  only  It is not intended as medical advice for individual conditions or treatments  Talk to your doctor, nurse or pharmacist before following any medical regimen to see if it is safe and effective for you

## 2019-11-11 NOTE — ASSESSMENT & PLAN NOTE
She states that she was taking apetamin syrup for weight gain and to increase her appetite  Before pregnancy and stopped when she found out  Reviewed plan for keeping up with small frequent meals, healthy choices, can send referral for nutrition if needed, pt declined today  When she eats, she does keep meals down, but does not have desire or appetite to eat she states

## 2019-11-11 NOTE — PROGRESS NOTES
Pt here for 1st obv  G/C&pap collected today  Pt unable to void  Will try again at end of visit  Pt declining flu vaccine

## 2019-11-12 ENCOUNTER — TELEPHONE (OUTPATIENT)
Dept: CARDIOLOGY CLINIC | Facility: CLINIC | Age: 24
End: 2019-11-12

## 2019-11-12 LAB
C TRACH DNA SPEC QL NAA+PROBE: NEGATIVE
N GONORRHOEA DNA SPEC QL NAA+PROBE: NEGATIVE

## 2019-11-17 LAB
LAB AP GYN PRIMARY INTERPRETATION: NORMAL
LAB AP LMP: NORMAL
Lab: NORMAL

## 2019-11-18 ENCOUNTER — APPOINTMENT (OUTPATIENT)
Dept: LAB | Facility: HOSPITAL | Age: 24
End: 2019-11-18
Payer: COMMERCIAL

## 2019-11-18 ENCOUNTER — TRANSCRIBE ORDERS (OUTPATIENT)
Dept: ADMINISTRATIVE | Facility: HOSPITAL | Age: 24
End: 2019-11-18

## 2019-11-18 DIAGNOSIS — Z34.90 PREGNANCY, UNSPECIFIED GESTATIONAL AGE: ICD-10-CM

## 2019-11-18 DIAGNOSIS — Z34.90 PREGNANCY, UNSPECIFIED GESTATIONAL AGE: Primary | ICD-10-CM

## 2019-11-18 DIAGNOSIS — Z34.81 PRENATAL CARE, SUBSEQUENT PREGNANCY, FIRST TRIMESTER: ICD-10-CM

## 2019-11-18 LAB
ABO GROUP BLD: NORMAL
AMPHETAMINES SERPL QL SCN: NEGATIVE
BACTERIA UR QL AUTO: ABNORMAL /HPF
BARBITURATES UR QL: NEGATIVE
BASOPHILS # BLD AUTO: 0.03 THOUSANDS/ΜL (ref 0–0.1)
BASOPHILS NFR BLD AUTO: 1 % (ref 0–1)
BENZODIAZ UR QL: NEGATIVE
BILIRUB UR QL STRIP: NEGATIVE
BLD GP AB SCN SERPL QL: NEGATIVE
CLARITY UR: ABNORMAL
COCAINE UR QL: NEGATIVE
COLOR UR: YELLOW
EOSINOPHIL # BLD AUTO: 0.06 THOUSAND/ΜL (ref 0–0.61)
EOSINOPHIL NFR BLD AUTO: 1 % (ref 0–6)
ERYTHROCYTE [DISTWIDTH] IN BLOOD BY AUTOMATED COUNT: 15 % (ref 11.6–15.1)
GLUCOSE UR STRIP-MCNC: NEGATIVE MG/DL
HBV SURFACE AG SER QL: NORMAL
HCT VFR BLD AUTO: 34.2 % (ref 34.8–46.1)
HGB BLD-MCNC: 11.4 G/DL (ref 11.5–15.4)
HGB UR QL STRIP.AUTO: ABNORMAL
IMM GRANULOCYTES # BLD AUTO: 0.02 THOUSAND/UL (ref 0–0.2)
IMM GRANULOCYTES NFR BLD AUTO: 0 % (ref 0–2)
KETONES UR STRIP-MCNC: NEGATIVE MG/DL
LEUKOCYTE ESTERASE UR QL STRIP: ABNORMAL
LYMPHOCYTES # BLD AUTO: 1.56 THOUSANDS/ΜL (ref 0.6–4.47)
LYMPHOCYTES NFR BLD AUTO: 26 % (ref 14–44)
MCH RBC QN AUTO: 27.1 PG (ref 26.8–34.3)
MCHC RBC AUTO-ENTMCNC: 33.3 G/DL (ref 31.4–37.4)
MCV RBC AUTO: 81 FL (ref 82–98)
METHADONE UR QL: NEGATIVE
MONOCYTES # BLD AUTO: 0.35 THOUSAND/ΜL (ref 0.17–1.22)
MONOCYTES NFR BLD AUTO: 6 % (ref 4–12)
NEUTROPHILS # BLD AUTO: 3.92 THOUSANDS/ΜL (ref 1.85–7.62)
NEUTS SEG NFR BLD AUTO: 66 % (ref 43–75)
NITRITE UR QL STRIP: NEGATIVE
NON-SQ EPI CELLS URNS QL MICRO: ABNORMAL /HPF
NRBC BLD AUTO-RTO: 0 /100 WBCS
OPIATES UR QL SCN: NEGATIVE
PCP UR QL: NEGATIVE
PH UR STRIP.AUTO: 5.5 [PH]
PLATELET # BLD AUTO: 290 THOUSANDS/UL (ref 149–390)
PMV BLD AUTO: 10.8 FL (ref 8.9–12.7)
PROT UR STRIP-MCNC: ABNORMAL MG/DL
RBC # BLD AUTO: 4.21 MILLION/UL (ref 3.81–5.12)
RBC #/AREA URNS AUTO: ABNORMAL /HPF
RH BLD: POSITIVE
RUBV IGG SERPL IA-ACNC: 116.7 IU/ML
SP GR UR STRIP.AUTO: 1.02 (ref 1–1.03)
SPECIMEN EXPIRATION DATE: NORMAL
THC UR QL: NEGATIVE
UROBILINOGEN UR QL STRIP.AUTO: 1 E.U./DL
WBC # BLD AUTO: 5.94 THOUSAND/UL (ref 4.31–10.16)
WBC #/AREA URNS AUTO: ABNORMAL /HPF

## 2019-11-18 PROCEDURE — 81001 URINALYSIS AUTO W/SCOPE: CPT

## 2019-11-18 PROCEDURE — 80307 DRUG TEST PRSMV CHEM ANLYZR: CPT

## 2019-11-18 PROCEDURE — 87186 SC STD MICRODIL/AGAR DIL: CPT

## 2019-11-18 PROCEDURE — 36415 COLL VENOUS BLD VENIPUNCTURE: CPT

## 2019-11-18 PROCEDURE — 80081 OBSTETRIC PANEL INC HIV TSTG: CPT

## 2019-11-18 PROCEDURE — 87086 URINE CULTURE/COLONY COUNT: CPT

## 2019-11-18 NOTE — PATIENT INSTRUCTIONS
Thank you for choosing us for your  care today  If you have any questions about your ultrasound or care, please do not hesitate to contact us or your primary obstetrician  Some general instructions for your pregnancy are:     Exercise: we encourage most pregnant women to get regular physical activity in pregnancy  Exercise has been shown to reduce the risk of several pregnancy-related complications  Unless instructed otherwise, you can aim for 22 minutes per day (150 minutes per week! )   Nutrition: aim for calcium-rich and iron-rich foods as well as healthy sources of protein   Weight: ask your doctor what is the appropriate amount of weight for you to gain in pregnancy  We have nutritionists here if you would like to meet with them   Protection from influenza: get yourself and your entire household vaccinated against influenza  Tell your partner to get vaccinated as well  Good hand hygiene can reduce the spread of this potentially deadly virus  Insist that everyone who is going to hold or be around your baby get vaccinated   Educate yourself about preeclampsia: preeclampsia is a common, serious complication in pregnancy  A blood pressure of 140mmHg (top number or systolic) OR 61RCQL (bottom number or diastolic) is elevated and needs evaluation by your doctor  Ask your doctor early in pregnancy if you should take aspirin (not motrin or tylenol) to prevent preeclampsia  If you were advised to take aspirin to prevent preeclampsia, a daily dose of 162mg or 81mg is advised  One resource to learn more is www  preeclampsia org    If you smoke, try to reduce how many cigarettes you smoke or quit completely  Do not vape       Other warning signs to watch out for in pregnancy or postpartum: chest pain, obstructed breathing or shortness of breath, seizures, thoughts of hurting yourself or your baby, bleeding, a painful or swollen leg, fever, or headache ([facility] POST-BIRTH Warning Signs campaign)  If these happen call 911  Itching is also not normal in pregnancy and if you experience this, especially over your hands and feet, potentially worse at night, notify your doctors

## 2019-11-19 ENCOUNTER — ROUTINE PRENATAL (OUTPATIENT)
Dept: PERINATAL CARE | Facility: OTHER | Age: 24
End: 2019-11-19
Payer: COMMERCIAL

## 2019-11-19 VITALS
SYSTOLIC BLOOD PRESSURE: 109 MMHG | HEIGHT: 65 IN | WEIGHT: 146.6 LBS | DIASTOLIC BLOOD PRESSURE: 65 MMHG | HEART RATE: 96 BPM | BODY MASS INDEX: 24.43 KG/M2

## 2019-11-19 DIAGNOSIS — Z36.82 ENCOUNTER FOR ANTENATAL SCREENING FOR NUCHAL TRANSLUCENCY: ICD-10-CM

## 2019-11-19 DIAGNOSIS — O09.899 HISTORY OF PRETERM DELIVERY, CURRENTLY PREGNANT: Primary | ICD-10-CM

## 2019-11-19 DIAGNOSIS — O09.299 PREGNANCY WITH POOR OBSTETRIC HISTORY: ICD-10-CM

## 2019-11-19 DIAGNOSIS — Z3A.13 13 WEEKS GESTATION OF PREGNANCY: ICD-10-CM

## 2019-11-19 LAB
HIV 1+2 AB+HIV1 P24 AG SERPL QL IA: NORMAL
RPR SER QL: NORMAL

## 2019-11-19 PROCEDURE — 76813 OB US NUCHAL MEAS 1 GEST: CPT | Performed by: OBSTETRICS & GYNECOLOGY

## 2019-11-19 PROCEDURE — 99242 OFF/OP CONSLTJ NEW/EST SF 20: CPT | Performed by: OBSTETRICS & GYNECOLOGY

## 2019-11-19 NOTE — PROGRESS NOTES
[address]: Ms Clark Evans was seen today at 13w3d for nuchal translucency ultrasound  See ultrasound report under "OB Procedures" tab  Please don't hesitate to contact our office with any concerns or questions    Carmela Pang MD

## 2019-11-19 NOTE — LETTER
November 19, 2019     [address]  [address] [address]    Patient: Maikel Crowley   YOB: 1995   Date of Visit: 11/19/2019       Dear Dr Logan Bowers: Thank you for referring Maikel Crowley to me for evaluation  Below are my notes for this consultation  If you have questions, please do not hesitate to call me  I look forward to following your patient along with you  Sincerely,        Grace Rosario MD        CC: No Recipients  Grace Rosario MD  11/19/2019 12:10 PM  Sign at close encounter  [address]: Ms Graham Ridley was seen today at 13w3d for nuchal translucency ultrasound  See ultrasound report under "OB Procedures" tab  Please don't hesitate to contact our office with any concerns or questions    Grace Rosario MD

## 2019-11-21 LAB — BACTERIA UR CULT: ABNORMAL

## 2019-11-22 ENCOUNTER — TELEPHONE (OUTPATIENT)
Dept: OBGYN CLINIC | Facility: CLINIC | Age: 24
End: 2019-11-22

## 2019-11-22 DIAGNOSIS — O23.40 URINARY TRACT INFECTION IN MOTHER DURING PREGNANCY, ANTEPARTUM: Primary | ICD-10-CM

## 2019-11-22 RX ORDER — AMOXICILLIN AND CLAVULANATE POTASSIUM 875; 125 MG/1; MG/1
1 TABLET, FILM COATED ORAL EVERY 12 HOURS SCHEDULED
Qty: 10 TABLET | Refills: 0 | Status: SHIPPED | OUTPATIENT
Start: 2019-11-22 | End: 2019-11-27

## 2019-11-22 NOTE — TELEPHONE ENCOUNTER
----- Message from Jaylan Vuong, [address] sent at 11/22/2019  9:13 AM EST -----  Please let pt know that urine culture is + UTI, so will treat  Rx sent for augmentin  Also will repeat urine culture 3 days after completing last dose  Order in chart  Thanks!

## 2019-11-26 ENCOUNTER — CONSULT (OUTPATIENT)
Dept: CARDIOLOGY CLINIC | Facility: CLINIC | Age: 24
End: 2019-11-26
Payer: COMMERCIAL

## 2019-11-26 ENCOUNTER — HOSPITAL ENCOUNTER (OUTPATIENT)
Dept: NON INVASIVE DIAGNOSTICS | Facility: CLINIC | Age: 24
Discharge: HOME | End: 2019-11-26
Payer: COMMERCIAL

## 2019-11-26 VITALS
DIASTOLIC BLOOD PRESSURE: 66 MMHG | SYSTOLIC BLOOD PRESSURE: 112 MMHG | BODY MASS INDEX: 24.83 KG/M2 | HEART RATE: 104 BPM | WEIGHT: 149 LBS | OXYGEN SATURATION: 100 % | HEIGHT: 65 IN | RESPIRATION RATE: 18 BRPM

## 2019-11-26 DIAGNOSIS — Z76.89 ENCOUNTER TO ESTABLISH CARE: Primary | ICD-10-CM

## 2019-11-26 DIAGNOSIS — O99.411: ICD-10-CM

## 2019-11-26 DIAGNOSIS — R00.2 PALPITATIONS: ICD-10-CM

## 2019-11-26 DIAGNOSIS — I05.9: ICD-10-CM

## 2019-11-26 PROCEDURE — 93308 TTE F-UP OR LMTD: CPT | Performed by: INTERNAL MEDICINE

## 2019-11-26 PROCEDURE — 93000 ELECTROCARDIOGRAM COMPLETE: CPT | Performed by: INTERNAL MEDICINE

## 2019-11-26 PROCEDURE — 93321 DOPPLER ECHO F-UP/LMTD STD: CPT | Performed by: INTERNAL MEDICINE

## 2019-11-26 PROCEDURE — 93325 DOPPLER ECHO COLOR FLOW MAPG: CPT | Performed by: INTERNAL MEDICINE

## 2019-11-26 PROCEDURE — 93306 TTE W/DOPPLER COMPLETE: CPT

## 2019-11-26 PROCEDURE — 99243 OFF/OP CNSLTJ NEW/EST LOW 30: CPT | Performed by: INTERNAL MEDICINE
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
## 2019-11-27 ENCOUNTER — TELEPHONE (OUTPATIENT)
Dept: CARDIOLOGY CLINIC | Facility: CLINIC | Age: 24
End: 2019-11-27

## 2019-11-27 NOTE — TELEPHONE ENCOUNTER
----- Message from Matilda Nova PA-C sent at 11/27/2019 11:48 AM EST -----  pls call echo was the same as last time

## 2019-12-03 ENCOUNTER — TELEPHONE (OUTPATIENT)
Dept: OBGYN CLINIC | Facility: CLINIC | Age: 24
End: 2019-12-03

## 2019-12-03 DIAGNOSIS — R51.9 PREGNANCY HEADACHE IN SECOND TRIMESTER: Primary | ICD-10-CM

## 2019-12-03 DIAGNOSIS — O26.892 PREGNANCY HEADACHE IN SECOND TRIMESTER: Primary | ICD-10-CM

## 2019-12-03 RX ORDER — BUTALBITAL, ACETAMINOPHEN AND CAFFEINE 50; 325; 40 MG/1; MG/1; MG/1
1 TABLET ORAL EVERY 4 HOURS PRN
Qty: 3 TABLET | Refills: 0 | Status: SHIPPED | OUTPATIENT
Start: 2019-12-03 | End: 2020-02-03 | Stop reason: ALTCHOICE

## 2019-12-03 RX ORDER — BUTALBITAL, ACETAMINOPHEN AND CAFFEINE 50; 325; 40 MG/1; MG/1; MG/1
1 TABLET ORAL EVERY 4 HOURS PRN
Qty: 3 TABLET | Refills: 0 | Status: SHIPPED | OUTPATIENT
Start: 2019-12-03 | End: 2019-12-03

## 2019-12-03 NOTE — TELEPHONE ENCOUNTER
Patient is 16 weeks pregnant and is having bad headaches  Patient states she had taken tylenol and it didn't help  Patient would like to know what else she can try  Please advise

## 2019-12-03 NOTE — TELEPHONE ENCOUNTER
Called patient and discussed her HA  She has had intermittent HA in her pregnancy and the last couple of days it has been worse  She has a decrease in her discomfort with tylenol but no resolution  She is eating and drinking okay but does report some nausea  We discussed she can come to the ER for evaluation and HA cocktail or we can trial a dose of fioricet at home and if no resolution she needs to present to the ER  She is motivated and comfortable with trial of oral fioricet at home and understands to present if she has worsening or no resolution of symptoms

## 2019-12-03 NOTE — TELEPHONE ENCOUNTER
Dear Dr Ni Cast:    OB Pt, Merary Tyson (: 89), called office today c/o consistent headache pain that she rates as an "8" at this time with regards to pain scale  Pt's GONZALES is 20, GA 15 weeks + 3 days,  4 Para 3, SAB 0 history, PPROM history, blood type = O+, blood pressure = 102/78 on 19  Pt reports she has been experiencing inconsistent headaches for approximately two weeks now  Pt further reports she has been taking OTC Tylenol and taking "naps", however, Tylenol does not alleviate headache pain  Pt states she has migraine headache history as a teenager  Pt further states she feels nauseous during headache episode  Pt states "she had issues with low blood pressure approximately 4 years ago "  Pt denies nausea, vomiting, and distorted vision/auras at this time  Please advise  Thank you!     Tess Wil, MA

## 2019-12-08 ENCOUNTER — HOSPITAL ENCOUNTER (EMERGENCY)
Facility: HOSPITAL | Age: 24
Discharge: HOME | End: 2019-12-08
Attending: EMERGENCY MEDICINE
Payer: COMMERCIAL

## 2019-12-08 VITALS
SYSTOLIC BLOOD PRESSURE: 120 MMHG | WEIGHT: 153 LBS | RESPIRATION RATE: 16 BRPM | DIASTOLIC BLOOD PRESSURE: 59 MMHG | HEART RATE: 104 BPM | TEMPERATURE: 98.7 F | OXYGEN SATURATION: 100 % | BODY MASS INDEX: 25.46 KG/M2

## 2019-12-08 DIAGNOSIS — O99.891 ASYMPTOMATIC BACTERIURIA DURING PREGNANCY: ICD-10-CM

## 2019-12-08 DIAGNOSIS — R82.71 ASYMPTOMATIC BACTERIURIA DURING PREGNANCY: ICD-10-CM

## 2019-12-08 DIAGNOSIS — R51.9 ACUTE NONINTRACTABLE HEADACHE, UNSPECIFIED HEADACHE TYPE: Primary | ICD-10-CM

## 2019-12-08 DIAGNOSIS — O23.40 URINARY TRACT INFECTION IN MOTHER DURING PREGNANCY, ANTEPARTUM: ICD-10-CM

## 2019-12-08 LAB
BACTERIA UR QL AUTO: ABNORMAL /HPF
BILIRUB UR QL STRIP: NEGATIVE
CLARITY UR: CLEAR
COLOR UR: YELLOW
GLUCOSE UR STRIP-MCNC: NEGATIVE MG/DL
HGB UR QL STRIP.AUTO: NEGATIVE
KETONES UR STRIP-MCNC: NEGATIVE MG/DL
LEUKOCYTE ESTERASE UR QL STRIP: ABNORMAL
NITRITE UR QL STRIP: NEGATIVE
NON-SQ EPI CELLS URNS QL MICRO: ABNORMAL /HPF
PH UR STRIP.AUTO: 6 [PH]
PROT UR STRIP-MCNC: NEGATIVE MG/DL
RBC #/AREA URNS AUTO: ABNORMAL /HPF
SP GR UR STRIP.AUTO: 1.02 (ref 1–1.03)
UROBILINOGEN UR QL STRIP.AUTO: 1 E.U./DL
WBC #/AREA URNS AUTO: ABNORMAL /HPF

## 2019-12-08 PROCEDURE — 87077 CULTURE AEROBIC IDENTIFY: CPT

## 2019-12-08 PROCEDURE — 87186 SC STD MICRODIL/AGAR DIL: CPT

## 2019-12-08 PROCEDURE — 99284 EMERGENCY DEPT VISIT MOD MDM: CPT | Performed by: EMERGENCY MEDICINE

## 2019-12-08 PROCEDURE — 96374 THER/PROPH/DIAG INJ IV PUSH: CPT

## 2019-12-08 PROCEDURE — 99283 EMERGENCY DEPT VISIT LOW MDM: CPT

## 2019-12-08 PROCEDURE — 96361 HYDRATE IV INFUSION ADD-ON: CPT

## 2019-12-08 PROCEDURE — 87086 URINE CULTURE/COLONY COUNT: CPT

## 2019-12-08 PROCEDURE — 81001 URINALYSIS AUTO W/SCOPE: CPT | Performed by: EMERGENCY MEDICINE

## 2019-12-08 RX ORDER — CEPHALEXIN 250 MG/1
500 CAPSULE ORAL EVERY 12 HOURS SCHEDULED
Qty: 28 CAPSULE | Refills: 0 | Status: SHIPPED | OUTPATIENT
Start: 2019-12-08 | End: 2019-12-09 | Stop reason: ALTCHOICE

## 2019-12-08 RX ORDER — METOCLOPRAMIDE HYDROCHLORIDE 5 MG/ML
10 INJECTION INTRAMUSCULAR; INTRAVENOUS ONCE
Status: COMPLETED | OUTPATIENT
Start: 2019-12-08 | End: 2019-12-08

## 2019-12-08 RX ORDER — ACETAMINOPHEN 325 MG/1
975 TABLET ORAL ONCE
Status: COMPLETED | OUTPATIENT
Start: 2019-12-08 | End: 2019-12-08

## 2019-12-08 RX ORDER — DEXTROSE AND SODIUM CHLORIDE 5; .45 G/100ML; G/100ML
1000 INJECTION, SOLUTION INTRAVENOUS CONTINUOUS
Status: DISPENSED | OUTPATIENT
Start: 2019-12-08 | End: 2019-12-08

## 2019-12-08 RX ADMIN — METOCLOPRAMIDE 10 MG: 5 INJECTION, SOLUTION INTRAMUSCULAR; INTRAVENOUS at 13:31

## 2019-12-08 RX ADMIN — DEXTROSE AND SODIUM CHLORIDE 1000 ML: 5; .45 INJECTION, SOLUTION INTRAVENOUS at 13:31

## 2019-12-08 RX ADMIN — ACETAMINOPHEN 975 MG: 325 TABLET, FILM COATED ORAL at 14:21

## 2019-12-08 NOTE — ED PROVIDER NOTES
Pt Name: Stefania Pearl  MRN: [id number]  [place] 1995  Age/Sex: 20 y o  female  Date of evaluation: 12/8/2019  PCP: Natalee Leija    Chief Complaint   Patient presents with    Migraine     Pt  reports on and off again mirgraine x1 week  P t reports taking tylenol without relief  Pt  states light sensitivity  HPI    20 y o  female presenting with 2 weeks of intermittent headache  Patient states she has been taking Tylenol with minimal relief over that time  The headache was gradual in onset, consistent with past migraines, is currently severe, radiating throughout the head, worse with light or noise, better at rest   She denies trauma, fever, chest pain, shortness of breath, other symptoms  Patient does note nausea as well as poor intake of food and fluids over the last 2 weeks      HPI      Past Medical and Surgical History    Past Medical History:   Diagnosis Date    Abnormal Pap smear of cervix 2017    Anemia     Anxiety     Depression     History of blood transfusion 2016    x2 units    Miscarriage 2010    16wk    Mitral valve prolapse 2016    Post partum depression 2016    she states she gave her mother custody of this son    Sickle cell trait ([facility] [address]     Urinary tract infection        Past Surgical History:   Procedure Laterality Date    DILATION AND EVACUATION  2010    16wk-       Family History   Problem Relation Age of Onset    No Known Problems Mother     Depression Father     Anxiety disorder Father     Sickle cell anemia Father     Sickle cell trait Sister     Depression Sister     Depression Brother     Anxiety disorder Brother     Bipolar disorder Brother     Drug abuse Brother     No Known Problems Son     Diabetes Maternal Grandmother     Hypertension Maternal Grandmother     Hypertension Maternal Grandfather     Hypertension Paternal Grandmother     Sickle cell anemia Paternal Grandmother     Sickle cell anemia Paternal Grandfather     Sickle cell trait Sister     Depression Sister     Sickle cell trait Sister     Depression Sister     Sickle cell trait Sister     Depression Sister     Sickle cell trait Brother     Sickle cell trait Brother     Sickle cell trait Brother     Anxiety disorder Brother     Sickle cell trait Son     Premature birth Son         32wks     Developmental delay Son        Social History     Tobacco Use    Smoking status: Former Smoker    Smokeless tobacco: Never Used   Substance Use Topics    Alcohol use: Yes     Frequency: 2-4 times a month     Drinks per session: 5 or 6     Comment: weekends, still continues to drink wine on occasion during pregnancy educated     Drug use: Never           Allergies    No Known Allergies    Home Medications    Prior to Admission medications    Medication Sig Start Date End Date Taking? Authorizing Provider   butalbital-acetaminophen-caffeine (FIORICET,ESGIC) -40 mg per tablet Take 1 tablet by mouth every 4 (four) hours as needed for headaches 12/3/19   Fidel Haines MD           Review of Systems    Review of Systems   Constitutional: Negative for activity change, chills and fever  HENT: Negative for drooling and facial swelling  Eyes: Positive for photophobia  Negative for pain, discharge and visual disturbance  Respiratory: Negative for apnea, cough, chest tightness, shortness of breath and wheezing  Cardiovascular: Negative for chest pain and leg swelling  Gastrointestinal: Negative for abdominal pain, constipation, diarrhea, nausea and vomiting  Genitourinary: Negative for difficulty urinating, dysuria and urgency  Musculoskeletal: Negative for arthralgias, back pain and gait problem  Skin: Negative for color change and rash  Neurological: Positive for headaches  Negative for dizziness, speech difficulty and weakness  Psychiatric/Behavioral: Negative for agitation, behavioral problems and confusion             All other systems reviewed and negative  Physical Exam      ED Triage Vitals   Temperature Pulse Respirations Blood Pressure SpO2   12/08/19 1301 12/08/19 1301 12/08/19 1301 12/08/19 1301 12/08/19 1301   98 7 °F (37 1 °C) 104 16 120/59 100 %      Temp Source Heart Rate Source Patient Position - Orthostatic VS BP Location FiO2 (%)   12/08/19 1301 12/08/19 1301 12/08/19 1301 -- --   Oral Monitor Lying        Pain Score       12/08/19 1303       8               Physical Exam   Constitutional: She is oriented to person, place, and time  She appears well-developed and well-nourished  HENT:   Head: Normocephalic and atraumatic  Eyes: Pupils are equal, round, and reactive to light  Conjunctivae and EOM are normal    Neck: Normal range of motion  Neck supple  Cardiovascular: Normal rate, regular rhythm, normal heart sounds and intact distal pulses  Pulmonary/Chest: Effort normal and breath sounds normal  No respiratory distress  She has no wheezes  She has no rales  Abdominal: Soft  She exhibits no distension  There is no tenderness  There is no rebound and no guarding  Musculoskeletal: Normal range of motion  She exhibits no edema or deformity  Neurological: She is alert and oriented to person, place, and time  She displays normal reflexes  No cranial nerve deficit or sensory deficit  She exhibits normal muscle tone  Coordination normal    Cranial nerves 2-12 intact, 5/5 strength in all extremities, normal speech and coordination   Skin: Skin is warm and dry  No rash noted  No erythema  Psychiatric: She has a normal mood and affect  Her behavior is normal  Judgment and thought content normal    Nursing note and vitals reviewed  Diagnostic Results      Labs:    Results Reviewed     Procedure Component Value Units Date/Time    Urine culture [[id number]] Collected:  12/08/19 1314    Lab Status:   In process Specimen:  Urine, Clean Catch Updated:  12/08/19 2014    Urine culture [[id number]]     Lab Status:  No result Specimen: Urine     Urinalysis with microscopic [[id number]]  (Abnormal) Collected:  12/08/19 1339    Lab Status:  Final result Specimen:  Urine, Clean Catch Updated:  12/08/19 1353     Clarity, UA Clear     Color, UA Yellow     Specific Gravity, UA 1 020     pH, UA 6 0     Glucose, UA Negative mg/dl      Ketones, UA Negative mg/dl      Blood, UA Negative     Protein, UA Negative mg/dl      Nitrite, UA Negative     Bilirubin, UA Negative     Urobilinogen, UA 1 0 E U /dl      Leukocytes, UA Small     WBC, UA 4-10 /hpf      RBC, UA None Seen /hpf      Bacteria, UA Moderate /hpf      Epithelial Cells Occasional /hpf           All labs reviewed and utilized in the medical decision making process    Radiology:    No orders to display       All radiology studies independently viewed by me and interpreted by the radiologist     Procedure    Procedures        ED Course of Care and Re-Assessments      Symptoms resolved IV dextrose, Reglan, Tylenol  Patient started on Keflex for asymptomatic bacteriuria during pregnancy  Medications   dextrose 5 % and sodium chloride 0 45 % infusion (0 mL Intravenous Stopped 12/8/19 1524)   metoclopramide (REGLAN) injection 10 mg (10 mg Intravenous Given 12/8/19 1331)   acetaminophen (TYLENOL) tablet 975 mg (975 mg Oral Given 12/8/19 1421)           FINAL IMPRESSION    Final diagnoses:   Acute nonintractable headache, unspecified headache type   Asymptomatic bacteriuria during pregnancy         DISPOSITION/PLAN    Headache and the bacteriuria as above  Based on history of poor p o  Intake as well as resolution with treatment as above, some suspicion for ketosis or inadequate intake as cause of headache  Very low suspicion for intracranial hemorrhage, sepsis, meningitis, venous sinus thrombosis, other acute life threat  Treated symptomatically and discharged with strict return precautions, also treated for asymptomatic bacteriuria    Hemodynamically stable and comfortable at time of discharge  Time reflects when diagnosis was documented in both MDM as applicable and the Disposition within this note     Time User Action Codes Description Comment    12/8/2019  2:56 PM Jen Shivani SELLERS Add [R51] Acute nonintractable headache, unspecified headache type     12/8/2019  2:57 PM Jen Shivani SELLERS Add [O99 89,  R82 71] Asymptomatic bacteriuria during pregnancy     12/8/2019  8:14 PM Amy Ballard Add [O23 40] Urinary tract infection in mother during pregnancy, antepartum       ED Disposition     ED Disposition Condition Date/Time Comment    Discharge Stable [place] Dec 8, 2019  2:56 PM Rebecca Ronda discharge to home/self care  Follow-up Information     Follow up With Specialties Details Why Damon Pollock MD Obstetrics and Gynecology Call in 1 day To discuss this visit and further care [address]  [phone]              PATIENT REFERRED TO:    Kenna Mc MD  [place] [address]  [address]  [phone]    Call in 1 day  To discuss this visit and further care      DISCHARGE MEDICATIONS:    Discharge Medication List as of 12/8/2019  3:00 PM      START taking these medications    Details   cephalexin (KEFLEX) 250 mg capsule Take 2 capsules (500 mg total) by mouth every 12 (twelve) hours for 7 days, Starting Sun 12/8/2019, Until Sun 12/15/2019, Print         CONTINUE these medications which have NOT CHANGED    Details   butalbital-acetaminophen-caffeine (FIORICET,ESGIC) -40 mg per tablet Take 1 tablet by mouth every 4 (four) hours as needed for headaches, Starting Tue 12/3/2019, Normal             No discharge procedures on file           MD Rigoberto Saldaña MD  12/08/19 8311

## 2019-12-09 ENCOUNTER — ROUTINE PRENATAL (OUTPATIENT)
Dept: OBGYN CLINIC | Age: 24
End: 2019-12-09
Payer: COMMERCIAL

## 2019-12-09 VITALS
WEIGHT: 148.2 LBS | SYSTOLIC BLOOD PRESSURE: 118 MMHG | DIASTOLIC BLOOD PRESSURE: 70 MMHG | BODY MASS INDEX: 24.69 KG/M2 | HEIGHT: 65 IN | RESPIRATION RATE: 14 BRPM

## 2019-12-09 DIAGNOSIS — Z34.82 ENCOUNTER FOR SUPERVISION OF NORMAL PREGNANCY IN MULTIGRAVIDA IN SECOND TRIMESTER: Primary | ICD-10-CM

## 2019-12-09 DIAGNOSIS — O09.212 HIGH RISK PREGNANCY DUE TO HISTORY OF PRETERM LABOR IN SECOND TRIMESTER: ICD-10-CM

## 2019-12-09 DIAGNOSIS — O99.419 MITRAL VALVE PROLAPSE OF MOTHER DURING PREGNANCY: ICD-10-CM

## 2019-12-09 DIAGNOSIS — I34.1 MITRAL VALVE PROLAPSE OF MOTHER DURING PREGNANCY: ICD-10-CM

## 2019-12-09 DIAGNOSIS — O23.42 URINARY TRACT INFECTION IN MOTHER DURING SECOND TRIMESTER OF PREGNANCY: ICD-10-CM

## 2019-12-09 LAB
SL AMB  POCT GLUCOSE, UA: NEGATIVE
SL AMB POCT URINE PROTEIN: NEGATIVE

## 2019-12-09 PROCEDURE — 99213 OFFICE O/P EST LOW 20 MIN: CPT | Performed by: STUDENT IN AN ORGANIZED HEALTH CARE EDUCATION/TRAINING PROGRAM

## 2019-12-09 NOTE — PROGRESS NOTES
24yo [id number] at [facility], here for return OB visit  Feeling well overall and without concerns  Did have a migraine for many days, improved s/p ED visit  Had migraines many years ago, but not recently  Is not drinking any water as it is poorly tolerated  Started drinking seltzer yesterday, which she can tolerate  Denies LOF, VB, contractions  Denies dysuria, hematuria  Problem List Items Addressed This Visit        Cardiovascular and Mediastinum    Mitral valve prolapse of mother during pregnancy     Appt with cardiology scheduled            Genitourinary    Urinary tract infection in mother during second trimester of pregnancy     -treated appropriately after last visit, less than one week since completion augmentin  Took as prescribed  -seen in ED yesterday for headache and prescribed Keflex BID  Has not taken yet, asymptomatic  As this was not tested for, and this Klebsiella with many resistances, recommend waiting until culture returns prior to treating, so treatment can be directed appropriately  Other    High risk pregnancy due to history of  labor in second trimester     -precautions reviewed  -has met with MFM and currently declines [place], has another appt tomorrow and will discuss again  -aware of recommendation for ASA, but has not taken   Will start 162mg today           Other Visit Diagnoses     Encounter for supervision of normal pregnancy in multigravida in second trimester    -  Primary    Relevant Orders    POCT urine dip (Completed)

## 2019-12-09 NOTE — ASSESSMENT & PLAN NOTE
-precautions reviewed  -discussed headaches in pregnancy often related to inadequate fluid intake  Tylenol safe in pregnancy  -has met with MFM and currently declines [place], has another appt tomorrow and will discuss again  -aware of recommendation for ASA, but has not taken   Will start 162mg today

## 2019-12-09 NOTE — ASSESSMENT & PLAN NOTE
-treated appropriately after last visit, less than one week since completion augmentin  Took as prescribed  -seen in ED yesterday for headache and prescribed Keflex BID  Has not taken yet, asymptomatic  As this was not tested for, and this Klebsiella with many resistances, recommend waiting until culture returns prior to treating, so treatment can be directed appropriately

## 2019-12-10 LAB
BACTERIA UR CULT: ABNORMAL
BACTERIA UR CULT: ABNORMAL

## 2019-12-11 ENCOUNTER — TELEPHONE (OUTPATIENT)
Dept: OBGYN CLINIC | Facility: CLINIC | Age: 24
End: 2019-12-11

## 2019-12-11 DIAGNOSIS — O23.42 URINARY TRACT INFECTION IN MOTHER DURING SECOND TRIMESTER OF PREGNANCY: Primary | ICD-10-CM

## 2019-12-11 RX ORDER — SULFAMETHOXAZOLE AND TRIMETHOPRIM 800; 160 MG/1; MG/1
1 TABLET ORAL EVERY 12 HOURS SCHEDULED
Qty: 6 TABLET | Refills: 0 | Status: SHIPPED | OUTPATIENT
Start: 2019-12-11 | End: 2019-12-14

## 2019-12-11 NOTE — TELEPHONE ENCOUNTER
----- Message from Kady Marroquin sent at 12/11/2019 11:17 AM EST -----  Please let OB pt know who is in her 2nd trimester, that she can take bactrim for her UTI  RX sent to her pharmacy  She will need a test of cure in 3 days after last dose is complete  Hydrate well, report and flank pain or fevers  Thanks! Order placed for test of cure in 3 days after last ABX dose is completed

## 2019-12-18 ENCOUNTER — HOSPITAL ENCOUNTER (EMERGENCY)
Facility: HOSPITAL | Age: 24
Discharge: HOME | End: 2019-12-18
Attending: EMERGENCY MEDICINE | Admitting: EMERGENCY MEDICINE
Payer: COMMERCIAL

## 2019-12-18 ENCOUNTER — APPOINTMENT (EMERGENCY)
Dept: RADIOLOGY | Facility: HOSPITAL | Age: 24
End: 2019-12-18
Payer: COMMERCIAL

## 2019-12-18 VITALS
TEMPERATURE: 99.7 F | HEART RATE: 114 BPM | DIASTOLIC BLOOD PRESSURE: 56 MMHG | SYSTOLIC BLOOD PRESSURE: 104 MMHG | OXYGEN SATURATION: 100 % | BODY MASS INDEX: 24.98 KG/M2 | RESPIRATION RATE: 16 BRPM | WEIGHT: 149.91 LBS | HEIGHT: 65 IN

## 2019-12-18 DIAGNOSIS — O26.899 ABDOMINAL PAIN DURING PREGNANCY, ANTEPARTUM: ICD-10-CM

## 2019-12-18 DIAGNOSIS — J10.1 INFLUENZA B: Primary | ICD-10-CM

## 2019-12-18 DIAGNOSIS — E86.0 DEHYDRATION: ICD-10-CM

## 2019-12-18 DIAGNOSIS — R10.9 ABDOMINAL PAIN DURING PREGNANCY, ANTEPARTUM: ICD-10-CM

## 2019-12-18 LAB
BACTERIA UR QL AUTO: ABNORMAL /HPF
BILIRUB UR QL STRIP: NEGATIVE
CLARITY UR: CLEAR
COLOR UR: YELLOW
FLUAV RNA NPH QL NAA+PROBE: ABNORMAL
FLUBV RNA NPH QL NAA+PROBE: DETECTED
GLUCOSE UR STRIP-MCNC: NEGATIVE MG/DL
HGB UR QL STRIP.AUTO: NEGATIVE
KETONES UR STRIP-MCNC: ABNORMAL MG/DL
LEUKOCYTE ESTERASE UR QL STRIP: ABNORMAL
NITRITE UR QL STRIP: NEGATIVE
NON-SQ EPI CELLS URNS QL MICRO: ABNORMAL /HPF
PH UR STRIP.AUTO: 6 [PH]
PROT UR STRIP-MCNC: NEGATIVE MG/DL
RBC #/AREA URNS AUTO: ABNORMAL /HPF
RSV RNA NPH QL NAA+PROBE: ABNORMAL
SP GR UR STRIP.AUTO: 1.01 (ref 1–1.03)
UROBILINOGEN UR QL STRIP.AUTO: 4 E.U./DL
WBC #/AREA URNS AUTO: ABNORMAL /HPF

## 2019-12-18 PROCEDURE — 87631 RESP VIRUS 3-5 TARGETS: CPT | Performed by: EMERGENCY MEDICINE

## 2019-12-18 PROCEDURE — 71045 X-RAY EXAM CHEST 1 VIEW: CPT

## 2019-12-18 PROCEDURE — 87086 URINE CULTURE/COLONY COUNT: CPT | Performed by: EMERGENCY MEDICINE

## 2019-12-18 PROCEDURE — 81001 URINALYSIS AUTO W/SCOPE: CPT | Performed by: EMERGENCY MEDICINE

## 2019-12-18 PROCEDURE — 99284 EMERGENCY DEPT VISIT MOD MDM: CPT | Performed by: EMERGENCY MEDICINE

## 2019-12-18 PROCEDURE — 99285 EMERGENCY DEPT VISIT HI MDM: CPT

## 2019-12-18 RX ORDER — OSELTAMIVIR PHOSPHATE 75 MG/1
75 CAPSULE ORAL EVERY 12 HOURS
Qty: 10 CAPSULE | Refills: 0 | Status: SHIPPED | OUTPATIENT
Start: 2019-12-18 | End: 2019-12-23

## 2019-12-18 RX ORDER — ACETAMINOPHEN 325 MG/1
650 TABLET ORAL EVERY 6 HOURS PRN
Qty: 40 TABLET | Refills: 0 | Status: SHIPPED | OUTPATIENT
Start: 2019-12-18 | End: 2020-01-10

## 2019-12-18 RX ORDER — ACETAMINOPHEN 325 MG/1
975 TABLET ORAL ONCE
Status: COMPLETED | OUTPATIENT
Start: 2019-12-18 | End: 2019-12-18

## 2019-12-18 RX ORDER — OSELTAMIVIR PHOSPHATE 75 MG/1
75 CAPSULE ORAL ONCE
Status: COMPLETED | OUTPATIENT
Start: 2019-12-18 | End: 2019-12-18

## 2019-12-18 RX ORDER — METOCLOPRAMIDE 10 MG/1
10 TABLET ORAL 4 TIMES DAILY
Qty: 30 TABLET | Refills: 0 | Status: SHIPPED | OUTPATIENT
Start: 2019-12-18 | End: 2020-02-03 | Stop reason: ALTCHOICE

## 2019-12-18 RX ORDER — METOCLOPRAMIDE 10 MG/1
10 TABLET ORAL ONCE
Status: COMPLETED | OUTPATIENT
Start: 2019-12-18 | End: 2019-12-18

## 2019-12-18 RX ADMIN — METOCLOPRAMIDE HYDROCHLORIDE 10 MG: 10 TABLET ORAL at 20:35

## 2019-12-18 RX ADMIN — ACETAMINOPHEN 975 MG: 325 TABLET, FILM COATED ORAL at 20:34

## 2019-12-18 RX ADMIN — OSELTAMIVIR PHOSPHATE 75 MG: 75 CAPSULE ORAL at 22:31

## 2019-12-19 NOTE — ED PROVIDER NOTES
Pt Name: Merline Plenty  MRN: [id number]  [place] 1995  Age/Sex: 20 y o  female  Date of evaluation: 12/18/2019  PCP: Erin Cho    Chief Complaint   Patient presents with    Abdominal Pain Pregnant     Patient states she is 4 5 months pregnant c/o weaknnes/fatigue, abd pain, nausea and coughing x 2 days    Weakness - Generalized         HPI    20 y o  female presenting with generalized weakness, abdominal pain, nausea, vomiting  Patient states the pain is in lower abdomen, radiating throughout the abdomen, moderate intensity, dull, worse with walking or with coughing and better at rest   She also notes coughing over the past 2 days  Of note, her son has been diagnosed with influenza yesterday  Patient has not measured temperature and is not sure if she had a fever or not      HPI      Past Medical and Surgical History    Past Medical History:   Diagnosis Date    Abnormal Pap smear of cervix 2017    Anemia     Anxiety     Depression     History of blood transfusion 2016    x2 units    Miscarriage 2010    16wk    Mitral valve prolapse 2016    Post partum depression 2016    she states she gave her mother custody of this son    Sickle cell trait ([facility] [address]     Urinary tract infection        Past Surgical History:   Procedure Laterality Date    DILATION AND EVACUATION  2010    16wk-       Family History   Problem Relation Age of Onset    No Known Problems Mother     Depression Father     Anxiety disorder Father     Sickle cell anemia Father     Sickle cell trait Sister     Depression Sister     Depression Brother     Anxiety disorder Brother     Bipolar disorder Brother     Drug abuse Brother     No Known Problems Son     Diabetes Maternal Grandmother     Hypertension Maternal Grandmother     Hypertension Maternal Grandfather     Hypertension Paternal Grandmother     Sickle cell anemia Paternal Grandmother     Sickle cell anemia Paternal Grandfather     Sickle cell trait Sister     Depression Sister     Sickle cell trait Sister     Depression Sister     Sickle cell trait Sister     Depression Sister     Sickle cell trait Brother     Sickle cell trait Brother     Sickle cell trait Brother     Anxiety disorder Brother     Sickle cell trait Son     Premature birth Son         32wks     Developmental delay Son        Social History     Tobacco Use    Smoking status: Never Smoker    Smokeless tobacco: Never Used   Substance Use Topics    Alcohol use: Not Currently     Frequency: 2-4 times a month     Drinks per session: 5 or 6     Comment: weekends, still continues to drink wine on occasion during pregnancy educated     Drug use: Never           Allergies    No Known Allergies    Home Medications    Prior to Admission medications    Medication Sig Start Date End Date Taking? Authorizing Provider   butalbital-acetaminophen-caffeine (FIORICET,ESGIC) -40 mg per tablet Take 1 tablet by mouth every 4 (four) hours as needed for headaches 12/3/19   Matthew Irving MD           Review of Systems    Review of Systems   Constitutional: Positive for appetite change and fatigue  Negative for activity change, chills and fever  HENT: Negative for drooling and facial swelling  Eyes: Negative for pain, discharge and visual disturbance  Respiratory: Positive for cough  Negative for apnea, chest tightness, shortness of breath and wheezing  Cardiovascular: Negative for chest pain and leg swelling  Gastrointestinal: Positive for abdominal pain, nausea and vomiting  Negative for constipation and diarrhea  Genitourinary: Negative for difficulty urinating, dysuria and urgency  Musculoskeletal: Negative for arthralgias, back pain and gait problem  Skin: Negative for color change and rash  Neurological: Negative for dizziness, speech difficulty, weakness and headaches  Psychiatric/Behavioral: Negative for agitation, behavioral problems and confusion  All other systems reviewed and negative  Physical Exam      ED Triage Vitals [12/18/19 1937]   Temperature Pulse Respirations Blood Pressure SpO2   99 7 °F (37 6 °C) (!) 120 16 106/58 99 %      Temp Source Heart Rate Source Patient Position - Orthostatic VS BP Location FiO2 (%)   Oral Monitor Lying Right arm --      Pain Score       Worst Possible Pain               Physical Exam   Constitutional: She is oriented to person, place, and time  She appears well-developed and well-nourished  HENT:   Head: Normocephalic and atraumatic  Eyes: Pupils are equal, round, and reactive to light  Conjunctivae and EOM are normal    Neck: Normal range of motion  Neck supple  Cardiovascular: Normal rate, regular rhythm, normal heart sounds and intact distal pulses  Pulmonary/Chest: Effort normal and breath sounds normal  No respiratory distress  She has no wheezes  She has no rales  Abdominal: Soft  She exhibits no distension  There is tenderness  There is no rebound and no guarding  Gravid, minimal tenderness to palpation bilateral lower quadrants, no rebound or guarding  Musculoskeletal: Normal range of motion  She exhibits no edema or deformity  Neurological: She is alert and oriented to person, place, and time  Skin: Skin is warm and dry  No rash noted  No erythema  Psychiatric: She has a normal mood and affect  Her behavior is normal  Judgment and thought content normal    Nursing note and vitals reviewed             Diagnostic Results  Informal bedside ultrasound showed good fetal motion with heart rate of 166 by M-mode    Labs:    Results Reviewed     Procedure Component Value Units Date/Time    Urine Microscopic [[id number]]  (Abnormal) Collected:  12/18/19 2125    Lab Status:  Final result Specimen:  Urine, Clean Catch Updated:  12/18/19 2205     RBC, UA 0-1 /hpf      WBC, UA 4-10 /hpf      Epithelial Cells Occasional /hpf      Bacteria, UA None Seen /hpf      URINE COMMENT --    Influenza A/B and RSV PCR [[id number]]  (Abnormal) Collected:  12/18/19 2039    Lab Status:  Final result Specimen:  Nose Updated:  12/18/19 2156     INFLUENZA A PCR None Detected     INFLUENZA B PCR Detected     RSV PCR None Detected    UA w Reflex to Microscopic w Reflex to Culture [[id number]]  (Abnormal) Collected:  12/18/19 2125    Lab Status:  Final result Specimen:  Urine, Clean Catch Updated:  12/18/19 2146     Color, UA Yellow     Clarity, UA Clear     Specific Gravity, UA 1 015     pH, UA 6 0     Leukocytes, UA Moderate     Nitrite, UA Negative     Protein, UA Negative mg/dl      Glucose, UA Negative mg/dl      Ketones, UA >=80 (3+) mg/dl      Urobilinogen, UA 4 0 E U /dl      Bilirubin, UA Negative     Blood, UA Negative     URINE COMMENT --    Urine culture [[id number]] Collected:  12/18/19 2125    Lab Status: In process Specimen:  Urine, Clean Catch Updated:  12/18/19 2146          All labs reviewed and utilized in the medical decision making process    Radiology:    XR chest 1 view portable   ED Interpretation   No acute disease          All radiology studies independently viewed by me and interpreted by the radiologist     Procedure    Procedures        ED Course of Care and Re-Assessments      Symptoms improved substantially with Tylenol and Reglan  Started on Tamiflu after positive flu test     Medications   acetaminophen (TYLENOL) tablet 975 mg (975 mg Oral Given 12/18/19 2034)   metoclopramide (REGLAN) tablet 10 mg (10 mg Oral Given 12/18/19 2035)   oseltamivir (TAMIFLU) capsule 75 mg (75 mg Oral Given 12/18/19 2231)           FINAL IMPRESSION    Final diagnoses:   Influenza B   Abdominal pain during pregnancy, antepartum   Dehydration         DISPOSITION/PLAN    Presentation as above felt most consistent with influenza B  Patient within 48 hours of symptom onset, started on Tamiflu after discussion of risks and benefits  Treated symptomatic with Tylenol and Reglan    Patient noted be tachycardic which improved with p o  Fluids which patient was able tolerate, patient states that she typically has a fast heart rate and this is normal for her, states that she has been told not to worry about it was the heart rate reaches 136  Patient requesting discharge at this time, discharged strict return precautions, follow up primary care doctor OB  Hemodynamically stable and comfortable time of discharge  Time reflects when diagnosis was documented in both MDM as applicable and the Disposition within this note     Time User Action Codes Description Comment    12/18/2019 10:09 PM Paloma SELLERS Add [J10 1] Influenza B     12/18/2019 10:10 PM Jacquie Deist Add [O26 899,  R10 9] Abdominal pain during pregnancy, antepartum     12/18/2019 10:10 PM Jacquie Deist Add [E86 0] Dehydration       ED Disposition     ED Disposition Condition Date/Time Comment    Discharge Stable Wed Dec 18, 2019 10:09 PM Margaux Paige discharge to home/self care              Follow-up Information     Follow up With Specialties Details Why [address] [facility] [place], [place],[address], [address] Obstetrics and Gynecology, Obstetrics, Gynecology Call in 1 day To discuss this visit and further care [address] [place] [address]  [address]              PATIENT REFERRED TO:    Jerri Spatz, [address] [place] [id number] [phone]    Call in 1 day  To discuss this visit and further care      DISCHARGE MEDICATIONS:    Patient's Medications   Discharge Prescriptions    ACETAMINOPHEN (TYLENOL) 325 MG TABLET    Take 2 tablets (650 mg total) by mouth every 6 (six) hours as needed for mild pain       Start Date: 12/18/2019End Date: --       Order Dose: 650 mg       Quantity: 40 tablet    Refills: 0    METOCLOPRAMIDE (REGLAN) 10 MG TABLET    Take 1 tablet (10 mg total) by mouth 4 (four) times a day       Start Date: 12/18/2019End Date: --       Order Dose: 10 mg       Quantity: 30 tablet    Refills: 0 OSELTAMIVIR (TAMIFLU) 75 MG CAPSULE    Take 1 capsule (75 mg total) by mouth every 12 (twelve) hours for 5 days       Start Date: 12/18/2019End Date: 12/23/2019       Order Dose: 75 mg       Quantity: 10 capsule    Refills: 0       No discharge procedures on file           MD Mahnaz Luna MD  12/18/19 5435

## 2019-12-20 ENCOUNTER — HOSPITAL ENCOUNTER (EMERGENCY)
Facility: HOSPITAL | Age: 24
Discharge: HOME | End: 2019-12-20
Attending: EMERGENCY MEDICINE | Admitting: EMERGENCY MEDICINE
Payer: COMMERCIAL

## 2019-12-20 VITALS
SYSTOLIC BLOOD PRESSURE: 104 MMHG | RESPIRATION RATE: 16 BRPM | TEMPERATURE: 98.6 F | DIASTOLIC BLOOD PRESSURE: 67 MMHG | OXYGEN SATURATION: 100 % | HEART RATE: 100 BPM

## 2019-12-20 DIAGNOSIS — N93.9 VAGINAL BLEEDING: Primary | ICD-10-CM

## 2019-12-20 DIAGNOSIS — O20.0 THREATENED MISCARRIAGE: ICD-10-CM

## 2019-12-20 LAB
ABO GROUP BLD: NORMAL
ANION GAP SERPL CALCULATED.3IONS-SCNC: 11 MMOL/L (ref 4–13)
BACTERIA UR CULT: NORMAL
BASOPHILS # BLD AUTO: 0.01 THOUSANDS/ΜL (ref 0–0.1)
BASOPHILS NFR BLD AUTO: 0 % (ref 0–1)
BLD GP AB SCN SERPL QL: NEGATIVE
BUN SERPL-MCNC: 4 MG/DL (ref 5–25)
CALCIUM SERPL-MCNC: 8.6 MG/DL (ref 8.3–10.1)
CHLORIDE SERPL-SCNC: 101 MMOL/L (ref 100–108)
CO2 SERPL-SCNC: 26 MMOL/L (ref 21–32)
CREAT SERPL-MCNC: 0.56 MG/DL (ref 0.6–1.3)
EOSINOPHIL # BLD AUTO: 0.04 THOUSAND/ΜL (ref 0–0.61)
EOSINOPHIL NFR BLD AUTO: 1 % (ref 0–6)
ERYTHROCYTE [DISTWIDTH] IN BLOOD BY AUTOMATED COUNT: 15 % (ref 11.6–15.1)
GFR SERPL CREATININE-BSD FRML MDRD: 150 ML/MIN/1.73SQ M
GLUCOSE SERPL-MCNC: 85 MG/DL (ref 65–140)
HCT VFR BLD AUTO: 35.5 % (ref 34.8–46.1)
HGB BLD-MCNC: 12 G/DL (ref 11.5–15.4)
IMM GRANULOCYTES # BLD AUTO: 0.02 THOUSAND/UL (ref 0–0.2)
IMM GRANULOCYTES NFR BLD AUTO: 1 % (ref 0–2)
LYMPHOCYTES # BLD AUTO: 0.85 THOUSANDS/ΜL (ref 0.6–4.47)
LYMPHOCYTES NFR BLD AUTO: 25 % (ref 14–44)
MCH RBC QN AUTO: 27.8 PG (ref 26.8–34.3)
MCHC RBC AUTO-ENTMCNC: 33.8 G/DL (ref 31.4–37.4)
MCV RBC AUTO: 82 FL (ref 82–98)
MONOCYTES # BLD AUTO: 0.49 THOUSAND/ΜL (ref 0.17–1.22)
MONOCYTES NFR BLD AUTO: 15 % (ref 4–12)
NEUTROPHILS # BLD AUTO: 1.96 THOUSANDS/ΜL (ref 1.85–7.62)
NEUTS SEG NFR BLD AUTO: 58 % (ref 43–75)
NRBC BLD AUTO-RTO: 0 /100 WBCS
PLATELET # BLD AUTO: 234 THOUSANDS/UL (ref 149–390)
PMV BLD AUTO: 11.5 FL (ref 8.9–12.7)
POTASSIUM SERPL-SCNC: 3.3 MMOL/L (ref 3.5–5.3)
RBC # BLD AUTO: 4.32 MILLION/UL (ref 3.81–5.12)
RH BLD: POSITIVE
SODIUM SERPL-SCNC: 138 MMOL/L (ref 136–145)
SPECIMEN EXPIRATION DATE: NORMAL
WBC # BLD AUTO: 3.37 THOUSAND/UL (ref 4.31–10.16)

## 2019-12-20 PROCEDURE — 86900 BLOOD TYPING SEROLOGIC ABO: CPT | Performed by: EMERGENCY MEDICINE

## 2019-12-20 PROCEDURE — 86850 RBC ANTIBODY SCREEN: CPT | Performed by: EMERGENCY MEDICINE

## 2019-12-20 PROCEDURE — 86901 BLOOD TYPING SEROLOGIC RH(D): CPT | Performed by: EMERGENCY MEDICINE

## 2019-12-20 PROCEDURE — 99285 EMERGENCY DEPT VISIT HI MDM: CPT

## 2019-12-20 PROCEDURE — 80048 BASIC METABOLIC PNL TOTAL CA: CPT | Performed by: EMERGENCY MEDICINE

## 2019-12-20 PROCEDURE — 99283 EMERGENCY DEPT VISIT LOW MDM: CPT | Performed by: EMERGENCY MEDICINE

## 2019-12-20 PROCEDURE — 36415 COLL VENOUS BLD VENIPUNCTURE: CPT | Performed by: EMERGENCY MEDICINE

## 2019-12-20 PROCEDURE — 96360 HYDRATION IV INFUSION INIT: CPT

## 2019-12-20 PROCEDURE — 85025 COMPLETE CBC W/AUTO DIFF WBC: CPT | Performed by: EMERGENCY MEDICINE

## 2019-12-20 PROCEDURE — 96361 HYDRATE IV INFUSION ADD-ON: CPT

## 2019-12-20 RX ORDER — ACETAMINOPHEN 325 MG/1
650 TABLET ORAL ONCE
Status: COMPLETED | OUTPATIENT
Start: 2019-12-20 | End: 2019-12-20

## 2019-12-20 RX ADMIN — ACETAMINOPHEN 650 MG: 325 TABLET, FILM COATED ORAL at 17:09

## 2019-12-20 RX ADMIN — SODIUM CHLORIDE 1000 ML: 0.9 INJECTION, SOLUTION INTRAVENOUS at 16:54

## 2019-12-20 NOTE — ED PROVIDER NOTES
History  Chief Complaint   Patient presents with    Vaginal Bleeding - Pregnant     PT co "heavy vaginal bleeding that started this morning " Pt denies abdominal pain/cramping  Pt is currently 4 1/2 months pregnant  PT recently seen here dx with flu  4 5 month pregnant female comes in for vaginal bleeding  She states she was seen recently for influenza  She has no pain and no cramping or pain  History provided by:  Patient   used: No    Vaginal Bleeding   Quality:  Dark red  Severity:  Mild  Onset quality:  Gradual  Timing:  Constant  Progression:  Worsening  Chronicity:  New  Context: not after intercourse, not at rest, not during intercourse and not during urination    Relieved by:  Nothing  Worsened by:  Nothing  Ineffective treatments:  None tried  Associated symptoms: no back pain    Risk factors: no gynecological surgery, no ovarian torsion and no STD exposure        Prior to Admission Medications   Prescriptions Last Dose Informant Patient Reported?  Taking?   acetaminophen (TYLENOL) 325 mg tablet   No No   Sig: Take 2 tablets (650 mg total) by mouth every 6 (six) hours as needed for mild pain   butalbital-acetaminophen-caffeine (FIORICET,ESGIC) -40 mg per tablet   No No   Sig: Take 1 tablet by mouth every 4 (four) hours as needed for headaches   metoclopramide (REGLAN) 10 mg tablet   No No   Sig: Take 1 tablet (10 mg total) by mouth 4 (four) times a day   oseltamivir (TAMIFLU) 75 mg capsule   No No   Sig: Take 1 capsule (75 mg total) by mouth every 12 (twelve) hours for 5 days      Facility-Administered Medications: None       Past Medical History:   Diagnosis Date    Abnormal Pap smear of cervix 2017    Anemia     Anxiety     Depression     History of blood transfusion 2016    x2 units    Miscarriage 2010    16wk    Mitral valve prolapse 2016    Post partum depression 2016    she states she gave her mother custody of this son    Sickle cell trait ([facility] [address] )     Urinary tract infection        Past Surgical History:   Procedure Laterality Date    DILATION AND EVACUATION  2010    16wk-       Family History   Problem Relation Age of Onset    No Known Problems Mother     Depression Father     Anxiety disorder Father     Sickle cell anemia Father     Sickle cell trait Sister     Depression Sister     Depression Brother     Anxiety disorder Brother     Bipolar disorder Brother     Drug abuse Brother     No Known Problems Son     Diabetes Maternal Grandmother     Hypertension Maternal Grandmother     Hypertension Maternal Grandfather     Hypertension Paternal Grandmother     Sickle cell anemia Paternal Grandmother     Sickle cell anemia Paternal Grandfather     Sickle cell trait Sister     Depression Sister     Sickle cell trait Sister     Depression Sister     Sickle cell trait Sister     Depression Sister     Sickle cell trait Brother     Sickle cell trait Brother     Sickle cell trait Brother     Anxiety disorder Brother     Sickle cell trait Son     Premature birth Son         32wks     Developmental delay Son      I have reviewed and agree with the history as documented  Social History     Tobacco Use    Smoking status: Never Smoker    Smokeless tobacco: Never Used   Substance Use Topics    Alcohol use: Not Currently     Frequency: 2-4 times a month     Drinks per session: 5 or 6     Comment: weekends, still continues to drink wine on occasion during pregnancy educated     Drug use: Never        Review of Systems   Genitourinary: Positive for vaginal bleeding  Musculoskeletal: Negative for back pain  All other systems reviewed and are negative  Physical Exam  Physical Exam   Constitutional: She is oriented to person, place, and time  She appears well-developed and well-nourished  HENT:   Head: Normocephalic and atraumatic     Right Ear: External ear normal    Left Ear: External ear normal    Eyes: Conjunctivae and EOM are normal    Neck: No JVD present  No tracheal deviation present  No thyromegaly present  Cardiovascular: Normal rate  Pulmonary/Chest: Effort normal and breath sounds normal  No stridor  Abdominal: Soft  She exhibits no distension and no mass  There is no tenderness  There is no guarding  No hernia  Musculoskeletal: Normal range of motion  She exhibits no edema, tenderness or deformity  Lymphadenopathy:     She has no cervical adenopathy  Neurological: She is alert and oriented to person, place, and time  Skin: Skin is warm  No rash noted  No erythema  No pallor  Psychiatric: She has a normal mood and affect  Her behavior is normal    Nursing note and vitals reviewed        Vital Signs  ED Triage Vitals   Temperature Pulse Respirations Blood Pressure SpO2   12/20/19 1614 12/20/19 1614 12/20/19 1614 12/20/19 1614 12/20/19 1614   98 6 °F (37 °C) 100 16 104/67 100 %      Temp Source Heart Rate Source Patient Position - Orthostatic VS BP Location FiO2 (%)   12/20/19 1614 12/20/19 1614 12/20/19 1614 12/20/19 1614 --   Oral Monitor Sitting Left arm       Pain Score       12/20/19 1709       9           Vitals:    12/20/19 1614   BP: 104/67   Pulse: 100   Patient Position - Orthostatic VS: Sitting         Visual Acuity      ED Medications  Medications   sodium chloride 0 9 % bolus 1,000 mL (0 mL Intravenous Stopped 12/20/19 1845)   acetaminophen (TYLENOL) tablet 650 mg (650 mg Oral Given 12/20/19 1709)       Diagnostic Studies  Results Reviewed     Procedure Component Value Units Date/Time    Basic metabolic panel [[id number]]  (Abnormal) Collected:  12/20/19 1653    Lab Status:  Final result Specimen:  Blood from Arm, Right Updated:  12/20/19 1708     Sodium 138 mmol/L      Potassium 3 3 mmol/L      Chloride 101 mmol/L      CO2 26 mmol/L      ANION GAP 11 mmol/L      BUN 4 mg/dL      Creatinine 0 56 mg/dL      Glucose 85 mg/dL      Calcium 8 6 mg/dL      eGFR 150 ml/min/1 73sq m     Narrative:       Consolidated [employer] guidelines for Chronic Kidney Disease (CKD):     Stage 1 with normal or high GFR (GFR > 90 mL/min/1 73 square meters)    Stage 2 Mild CKD (GFR = 60-89 mL/min/1 73 square meters)    Stage 3A Moderate CKD (GFR = 45-59 mL/min/1 73 square meters)    Stage 3B Moderate CKD (GFR = 30-44 mL/min/1 73 square meters)    Stage 4 Severe CKD (GFR = 15-29 mL/min/1 73 square meters)    Stage 5 End Stage CKD (GFR <15 mL/min/1 73 square meters)  Note: GFR calculation is accurate only with a steady state creatinine    CBC and differential [[id number]]  (Abnormal) Collected:  12/20/19 1653    Lab Status:  Final result Specimen:  Blood from Arm, Right Updated:  12/20/19 1705     WBC 3 37 Thousand/uL      RBC 4 32 Million/uL      Hemoglobin 12 0 g/dL      Hematocrit 35 5 %      MCV 82 fL      MCH 27 8 pg      MCHC 33 8 g/dL      RDW 15 0 %      MPV 11 5 fL      Platelets 238 Thousands/uL      nRBC 0 /100 WBCs      Neutrophils Relative 58 %      Immat GRANS % 1 %      Lymphocytes Relative 25 %      Monocytes Relative 15 %      Eosinophils Relative 1 %      Basophils Relative 0 %      Neutrophils Absolute 1 96 Thousands/µL      Immature Grans Absolute 0 02 Thousand/uL      Lymphocytes Absolute 0 85 Thousands/µL      Monocytes Absolute 0 49 Thousand/µL      Eosinophils Absolute 0 04 Thousand/µL      Basophils Absolute 0 01 Thousands/µL                  No orders to display              Procedures  Procedures         ED Course  ED Course as of Dec 22 1444   Fri Dec 20, 2019   1817 Transfer to [facility] [place]    Ob will see her there                                  MDM  Number of Diagnoses or Management Options  Threatened miscarriage: new and requires workup  Vaginal bleeding: new and requires workup     Amount and/or Complexity of Data Reviewed  Clinical lab tests: ordered and reviewed  Decide to obtain previous medical records or to obtain history from someone other than the patient: yes  Review and summarize past medical records: yes    Patient Progress  Patient progress: stable        Disposition  Final diagnoses:   Vaginal bleeding   Threatened miscarriage     Time reflects when diagnosis was documented in both MDM as applicable and the Disposition within this note     Time User Action Codes Description Comment    12/20/2019  6:23 PM Ane Hawking Add [N93 9] Vaginal bleeding     12/20/2019  6:36 PM Ane Hawking Add [O20 0] Threatened miscarriage       ED Disposition     ED Disposition Condition Date/Time Comment    [facility]  Fri Dec 20, 2019  6:35 PM Saul Simms should be transferred out to [facility], ED, Dr Earnest Jean-Baptiste for evaluaiton and ultrasound          MD Documentation      Most Recent Value   Patient Condition  The patient has been stabilized such that within reasonable medical probability, no material deterioration of the patient condition or the condition of the unborn child(alberto) is likely to result from the transfer   Provider Certification  General risk, such as traffic hazards, adverse weather conditions, rough terrain or turbulence, possible failure of equipment (including vehicle or aircraft), or consequences of actions of persons outside the control of the transport personnel      Follow-up Information    None         Discharge Medication List as of 12/20/2019  6:51 PM      CONTINUE these medications which have NOT CHANGED    Details   acetaminophen (TYLENOL) 325 mg tablet Take 2 tablets (650 mg total) by mouth every 6 (six) hours as needed for mild pain, Starting Wed 12/18/2019, Print      butalbital-acetaminophen-caffeine (FIORICET,ESGIC) -40 mg per tablet Take 1 tablet by mouth every 4 (four) hours as needed for headaches, Starting Tue 12/3/2019, Normal      metoclopramide (REGLAN) 10 mg tablet Take 1 tablet (10 mg total) by mouth 4 (four) times a day, Starting Wed 12/18/2019, Print      oseltamivir (TAMIFLU) 75 mg capsule Take 1 capsule (75 mg total) by mouth every 12 (twelve) hours for 5 days, Starting Wed 12/18/2019, Until Mon 12/23/2019, Print           No discharge procedures on file      ED Provider  Electronically Signed by           Kiesha Aldridge,   12/22/19 0446

## 2019-12-20 NOTE — ED NOTES
Pt refused to stay for transfer, [facility]        Angela Ortiz, RN  12/20/19 4507

## 2019-12-20 NOTE — EMTALA/ACUTE CARE TRANSFER
[address] [facility] [address]  [address]  [facility] [address]  Dept: [phone]      EMTALA TRANSFER CONSENT    NAME Agustín Johnston                                         1995                              MRN [id number]    I have been informed of my rights regarding examination, treatment, and transfer   by Dr Jeison Francis, DO    Benefits:      Risks:        Consent for Transfer:  I acknowledge that my medical condition has been evaluated and explained to me by the emergency department physician or other qualified medical person and/or my attending physician, who has recommended that I be transferred to the service of    at    The above potential benefits of such transfer, the potential risks associated with such transfer, and the probable risks of not being transferred have been explained to me, and I fully understand them  The doctor has explained that, in my case, the benefits of transfer outweigh the risks  I agree to be transferred  I authorize the performance of emergency medical procedures and treatments upon me in both transit and upon arrival at the receiving facility  Additionally, I authorize the release of any and all medical records to the receiving facility and request they be transported with me, if possible  I understand that the safest mode of transportation during a medical emergency is an ambulance and that the Hospital advocates the use of this mode of transport  Risks of traveling to the receiving facility by car, including absence of medical control, life sustaining equipment, such as oxygen, and medical personnel has been explained to me and I fully understand them  (SAVI CORRECT BOX BELOW)  [  ]  I consent to the stated transfer and to be transported by ambulance/helicopter  [  ]  I consent to the stated transfer, but refuse transportation by ambulance and accept full responsibility for my transportation by car    I understand the risks of non-ambulance transfers and I exonerate the Hospital and its staff from any deterioration in my condition that results from this refusal     X___________________________________________    DATE  19  TIME________  Signature of patient or legally responsible individual signing on patient behalf           RELATIONSHIP TO PATIENT_________________________          Provider Certification    NAME Cami Moreno                                         1995                              MRN [id number]    A medical screening exam was performed on the above named patient  Based on the examination:    Condition Necessitating Transfer The encounter diagnosis was Vaginal bleeding  Patient Condition: The patient has been stabilized such that within reasonable medical probability, no material deterioration of the patient condition or the condition of the unborn child(alberto) is likely to result from the transfer    Reason for Transfer:      Transfer Requirements: Facility     · Space available and qualified personnel available for treatment as acknowledged by    · Agreed to accept transfer and to provide appropriate medical treatment as acknowledged by          · Appropriate medical records of the examination and treatment of the patient are provided at the time of transfer   [address] [facility], [address] _______  · Transfer will be performed by qualified personnel from    and appropriate transfer equipment as required, including the use of necessary and appropriate life support measures      Provider Certification: I have examined the patient and explained the following risks and benefits of being transferred/refusing transfer to the patient/family:  General risk, such as traffic hazards, adverse weather conditions, rough terrain or turbulence, possible failure of equipment (including vehicle or aircraft), or consequences of actions of persons outside the control of the transport personnel      Based on these reasonable risks and benefits to the patient and/or the unborn child(alberto), and based upon the information available at the time of the patients examination, I certify that the medical benefits reasonably to be expected from the provision of appropriate medical treatments at another medical facility outweigh the increasing risks, if any, to the individuals medical condition, and in the case of labor to the unborn child, from effecting the transfer      X____________________________________________ DATE 12/20/19        TIME_______      ORIGINAL - SEND TO MEDICAL RECORDS   COPY - SEND WITH PATIENT DURING TRANSFER

## 2020-01-09 ENCOUNTER — ROUTINE PRENATAL (OUTPATIENT)
Dept: PERINATAL CARE | Facility: OTHER | Age: 25
End: 2020-01-09
Payer: COMMERCIAL

## 2020-01-09 VITALS
HEART RATE: 99 BPM | SYSTOLIC BLOOD PRESSURE: 114 MMHG | HEIGHT: 65 IN | BODY MASS INDEX: 24.32 KG/M2 | DIASTOLIC BLOOD PRESSURE: 68 MMHG | WEIGHT: 146 LBS

## 2020-01-09 DIAGNOSIS — O40.2XX0 POLYHYDRAMNIOS IN SECOND TRIMESTER, SINGLE OR UNSPECIFIED FETUS: Primary | ICD-10-CM

## 2020-01-09 DIAGNOSIS — E63.9 INADEQUATE DIETARY INTAKE: ICD-10-CM

## 2020-01-09 DIAGNOSIS — Z3A.20 20 WEEKS GESTATION OF PREGNANCY: ICD-10-CM

## 2020-01-09 DIAGNOSIS — O09.212 HIGH RISK PREGNANCY DUE TO HISTORY OF PRETERM LABOR IN SECOND TRIMESTER: ICD-10-CM

## 2020-01-09 PROCEDURE — 76811 OB US DETAILED SNGL FETUS: CPT | Performed by: OBSTETRICS & GYNECOLOGY

## 2020-01-09 PROCEDURE — 99212 OFFICE O/P EST SF 10 MIN: CPT | Performed by: OBSTETRICS & GYNECOLOGY

## 2020-01-09 PROCEDURE — 76817 TRANSVAGINAL US OBSTETRIC: CPT | Performed by: OBSTETRICS & GYNECOLOGY

## 2020-01-09 NOTE — PROGRESS NOTES
A transvaginal ultrasound was performed  Sonographer note on use of High Level Disinfection Process (Trophon) for transvaginal probe# 3 used, serial [id number] Ana

## 2020-01-10 ENCOUNTER — TELEPHONE (OUTPATIENT)
Dept: PERINATAL CARE | Facility: OTHER | Age: 25
End: 2020-01-10

## 2020-01-10 ENCOUNTER — ROUTINE PRENATAL (OUTPATIENT)
Dept: OBGYN CLINIC | Age: 25
End: 2020-01-10
Payer: COMMERCIAL

## 2020-01-10 VITALS — DIASTOLIC BLOOD PRESSURE: 72 MMHG | WEIGHT: 147 LBS | BODY MASS INDEX: 24.46 KG/M2 | SYSTOLIC BLOOD PRESSURE: 120 MMHG

## 2020-01-10 DIAGNOSIS — N89.8 VAGINAL DISCHARGE DURING PREGNANCY IN SECOND TRIMESTER: ICD-10-CM

## 2020-01-10 DIAGNOSIS — Z34.92 PREGNANCY, OBSTETRICAL CARE, SECOND TRIMESTER: Primary | ICD-10-CM

## 2020-01-10 DIAGNOSIS — O99.342 DEPRESSION AFFECTING PREGNANCY IN SECOND TRIMESTER, ANTEPARTUM: ICD-10-CM

## 2020-01-10 DIAGNOSIS — E63.9 INADEQUATE DIETARY INTAKE: ICD-10-CM

## 2020-01-10 DIAGNOSIS — R51.9 NONINTRACTABLE HEADACHE, UNSPECIFIED CHRONICITY PATTERN, UNSPECIFIED HEADACHE TYPE: ICD-10-CM

## 2020-01-10 DIAGNOSIS — O26.892 VAGINAL DISCHARGE DURING PREGNANCY IN SECOND TRIMESTER: ICD-10-CM

## 2020-01-10 DIAGNOSIS — O99.419 MITRAL VALVE PROLAPSE OF MOTHER DURING PREGNANCY: ICD-10-CM

## 2020-01-10 DIAGNOSIS — O40.2XX0 POLYHYDRAMNIOS IN SECOND TRIMESTER, SINGLE OR UNSPECIFIED FETUS: ICD-10-CM

## 2020-01-10 DIAGNOSIS — O09.899 HISTORY OF PRETERM DELIVERY, CURRENTLY PREGNANT: ICD-10-CM

## 2020-01-10 DIAGNOSIS — F32.A DEPRESSION AFFECTING PREGNANCY IN SECOND TRIMESTER, ANTEPARTUM: ICD-10-CM

## 2020-01-10 DIAGNOSIS — I34.1 MITRAL VALVE PROLAPSE OF MOTHER DURING PREGNANCY: ICD-10-CM

## 2020-01-10 LAB
SL AMB  POCT GLUCOSE, UA: NORMAL
SL AMB POCT URINE PROTEIN: NORMAL
SL AMB POCT WET MOUNT: ABNORMAL

## 2020-01-10 PROCEDURE — 99213 OFFICE O/P EST LOW 20 MIN: CPT | Performed by: STUDENT IN AN ORGANIZED HEALTH CARE EDUCATION/TRAINING PROGRAM

## 2020-01-10 RX ORDER — ACETAMINOPHEN 500 MG
325 TABLET ORAL EVERY 6 HOURS PRN
COMMUNITY
End: 2020-07-02

## 2020-01-10 RX ORDER — METRONIDAZOLE 7.5 MG/G
1 GEL VAGINAL 2 TIMES DAILY
Qty: 70 G | Refills: 0 | Status: SHIPPED | OUTPATIENT
Start: 2020-01-10 | End: 2020-01-24 | Stop reason: ALTCHOICE

## 2020-01-10 RX ORDER — SERTRALINE HYDROCHLORIDE 25 MG/1
25 TABLET, FILM COATED ORAL DAILY
Qty: 40 TABLET | Refills: 3 | Status: SHIPPED | OUTPATIENT
Start: 2020-01-10 | End: 2020-07-02

## 2020-01-10 NOTE — PROGRESS NOTES
29 yo [id number] at 20 6/7 weeks here for routine prenatal visit  Recently had Level 2 ultrasound, newly diagnosed polyhydramnios, nervous about this diagnosis, considering second opinion  Headaches resolved  Trying to increase intake of water, food despite low appetite  Appetite worse with recent depressive episodes  Obstetrically no issues  Denies LOF/VB/ctx/decreased FM  Does report increased vaginal discharge  Diagnoses and all orders for this visit:    Pregnancy, obstetrical care, second trimester  -     POCT urine dip  -     Ambulatory referral to [facility]; Future   - Referral made for new PCP as patient has had difficulty with access   - reviewed bleeding precautions  Inadequate dietary intake  -     Ambulatory referral to Nutrition Services; Future   - discussed trying to drink more water, using flavoring to help intake   - Currently drinking ensures to supplement as having difficulty keeping an appetite   - Reviewed weight loss over the last month, recommendation for weight gain in pregnancy   - Referral made to Nutrition  Vaginal discharge during pregnancy in second trimester  -     metroNIDAZOLE (METROGEL) 0 75 % vaginal gel;  Insert 1 application into the vagina 2 (two) times a day  -     POCT wet mount   - diagnosed with BV, reviewed etiology in imbalance pH, bacteria of vagina, not STD   - Reviewed importance of treatment, possible correlation with PTL and miscarriage    History of  delivery, currently pregnant   - declined Greta   - Plan for repeat cervical length at next MFM ultrasound  Nonintractable headache, unspecified chronicity pattern, unspecified headache type   - resolved  Polyhydramnios in second trimester, single or unspecified fetus   - newly diagnosed   - reviewed plan to gather more data with MFM at next scan, no definitive cause at this time, will continue to monitor   - plan for possible fetal echo per MFM, appreciate reccs  Mitral valve prolapse of mother during pregnancy   - continuing workup outpatient with Cardiology, appreciate [facility]  Depression affecting pregnancy in second trimester, antepartum  -     sertraline (ZOLOFT) 25 mg tablet; Take 1 tablet (25 mg total) by mouth daily    Reviewed at length today Luís's recent difficulty with her mood  No SI/HI, but has had low energy and notices lower appetite when she has depressive episodes  Discussed role of antidepressants, safety in pregnancy  In agreement to start with low dose Zoloft 25 mg daily, reviewed side effects  Continues to attend weekly sessions with local pregnancy center, encouraged to continue this  Will follow up with me in near future to touch base about how things are going with this Rx  Will call with any issues  Other orders  -     Prenatal Multivit-Min-Fe-FA (PRENATAL VITAMINS PO); Take 1 tablet by mouth daily  -     acetaminophen (TYLENOL) 500 mg tablet;  Take 325 mg by mouth every 6 (six) hours as needed

## 2020-01-24 ENCOUNTER — ROUTINE PRENATAL (OUTPATIENT)
Dept: OBGYN CLINIC | Age: 25
End: 2020-01-24
Payer: COMMERCIAL

## 2020-01-24 ENCOUNTER — ROUTINE PRENATAL (OUTPATIENT)
Dept: PERINATAL CARE | Facility: OTHER | Age: 25
End: 2020-01-24
Payer: COMMERCIAL

## 2020-01-24 VITALS
SYSTOLIC BLOOD PRESSURE: 107 MMHG | BODY MASS INDEX: 24.41 KG/M2 | WEIGHT: 146.5 LBS | HEIGHT: 65 IN | DIASTOLIC BLOOD PRESSURE: 71 MMHG | HEART RATE: 97 BPM

## 2020-01-24 VITALS — BODY MASS INDEX: 24.13 KG/M2 | WEIGHT: 145 LBS | SYSTOLIC BLOOD PRESSURE: 108 MMHG | DIASTOLIC BLOOD PRESSURE: 62 MMHG

## 2020-01-24 DIAGNOSIS — O09.899 HISTORY OF PRETERM DELIVERY, CURRENTLY PREGNANT: ICD-10-CM

## 2020-01-24 DIAGNOSIS — O99.419 MITRAL VALVE PROLAPSE OF MOTHER DURING PREGNANCY: ICD-10-CM

## 2020-01-24 DIAGNOSIS — O40.2XX0 POLYHYDRAMNIOS IN SECOND TRIMESTER, SINGLE OR UNSPECIFIED FETUS: Primary | ICD-10-CM

## 2020-01-24 DIAGNOSIS — E63.9 INADEQUATE DIETARY INTAKE: ICD-10-CM

## 2020-01-24 DIAGNOSIS — Z3A.22 22 WEEKS GESTATION OF PREGNANCY: ICD-10-CM

## 2020-01-24 DIAGNOSIS — O09.299 PREGNANCY WITH POOR OBSTETRIC HISTORY: ICD-10-CM

## 2020-01-24 DIAGNOSIS — O40.2XX0 POLYHYDRAMNIOS IN SECOND TRIMESTER, SINGLE OR UNSPECIFIED FETUS: ICD-10-CM

## 2020-01-24 DIAGNOSIS — O99.342 DEPRESSION AFFECTING PREGNANCY IN SECOND TRIMESTER, ANTEPARTUM: ICD-10-CM

## 2020-01-24 DIAGNOSIS — I34.1 MITRAL VALVE PROLAPSE OF MOTHER DURING PREGNANCY: ICD-10-CM

## 2020-01-24 DIAGNOSIS — F32.A DEPRESSION AFFECTING PREGNANCY IN SECOND TRIMESTER, ANTEPARTUM: ICD-10-CM

## 2020-01-24 DIAGNOSIS — O26.892 VAGINAL DISCHARGE DURING PREGNANCY IN SECOND TRIMESTER: ICD-10-CM

## 2020-01-24 DIAGNOSIS — O09.212 HIGH RISK PREGNANCY DUE TO HISTORY OF PRETERM LABOR IN SECOND TRIMESTER: Primary | ICD-10-CM

## 2020-01-24 DIAGNOSIS — N89.8 VAGINAL DISCHARGE DURING PREGNANCY IN SECOND TRIMESTER: ICD-10-CM

## 2020-01-24 PROCEDURE — 93325 DOPPLER ECHO COLOR FLOW MAPG: CPT | Performed by: OBSTETRICS & GYNECOLOGY

## 2020-01-24 PROCEDURE — 76827 ECHO EXAM OF FETAL HEART: CPT | Performed by: OBSTETRICS & GYNECOLOGY

## 2020-01-24 PROCEDURE — 76817 TRANSVAGINAL US OBSTETRIC: CPT | Performed by: OBSTETRICS & GYNECOLOGY

## 2020-01-24 PROCEDURE — 99213 OFFICE O/P EST LOW 20 MIN: CPT | Performed by: NURSE PRACTITIONER

## 2020-01-24 PROCEDURE — 99212 OFFICE O/P EST SF 10 MIN: CPT | Performed by: OBSTETRICS & GYNECOLOGY

## 2020-01-24 PROCEDURE — 76825 ECHO EXAM OF FETAL HEART: CPT | Performed by: OBSTETRICS & GYNECOLOGY

## 2020-01-24 RX ORDER — METRONIDAZOLE 500 MG/1
500 TABLET ORAL EVERY 12 HOURS SCHEDULED
Qty: 14 TABLET | Refills: 0 | Status: SHIPPED | OUTPATIENT
Start: 2020-01-24 | End: 2020-01-31

## 2020-01-24 NOTE — PROGRESS NOTES
The patient was seen today for an ultrasound  Please see ultrasound report (located under Ob Procedures) for additional details  Thank you very much for allowing us to participate in the care of this very nice patient  Should you have any questions, please do not hesitate to contact me  Uziel Nguyen MD [address] [place]  Attending Physician, Rafael

## 2020-01-24 NOTE — PROGRESS NOTES
Problem List Items Addressed This Visit        Cardiovascular and Mediastinum    Mitral valve prolapse of mother during pregnancy       Other    Inadequate dietary intake    Pregnancy with poor obstetric history    High risk pregnancy due to history of  labor in second trimester - Primary    Polyhydramnios in second trimester      Other Visit Diagnoses     Vaginal discharge during pregnancy in second trimester        Relevant Medications    metroNIDAZOLE (FLAGYL) 500 mg tablet    Depression affecting pregnancy in second trimester, antepartum            Feels well  Denies LOF/CTX/VB  No concerns  Doing ok on zoloft but it makes her drowsy so she will try taking 1/2 tab every day for now  She took 2 days of metrogel but requests to switch to po formulation  Has MFM appt in 2 hrs to check on the baby's heart and the polyhydramnios

## 2020-01-24 NOTE — PATIENT INSTRUCTIONS
Pregnancy at 23 to 26 Weeks   AMBULATORY CARE:   What changes are happening to your body: You are now close to or at the beginning of the third trimester  The third trimester starts at 24 weeks and ends with delivery  As your baby gets larger, you may develop certain symptoms  These may include pain in your back or down the sides of your abdomen  You may also have stretch marks on your abdomen, breasts, thighs, or buttocks  You may also have constipation  Seek care immediately if:   · You develop a severe headache that does not go away  · You have new or increased vision changes, such as blurred or spotted vision  · You have new or increased swelling in your face or hands  · You have vaginal spotting or bleeding  · Your water broke or you feel warm water gushing or trickling from your vagina  Contact your healthcare provider if:   · You have abdominal cramps, pressure, or tightening  · You have a change in vaginal discharge  · You have light bleeding  · You have chills or a fever  · You have vaginal itching, burning, or pain  · You have yellow, green, white, or foul-smelling vaginal discharge  · You have pain or burning when you urinate, less urine than usual, or pink or bloody urine  · You have questions or concerns about your condition or care  How to care for yourself at this stage of your pregnancy:   · Eat a variety of healthy foods  Healthy foods include fruits, vegetables, whole-grain breads, low-fat dairy foods, beans, lean meats, and fish  Drink liquids as directed  Ask how much liquid to drink each day and which liquids are best for you  Limit caffeine to less than 200 milligrams each day  Limit your intake of fish to 2 servings each week  Choose fish low in mercury such as canned light tuna, shrimp, salmon, cod, or tilapia  Do not  eat fish high in mercury such as swordfish, tilefish, aranza mackerel, and shark  · Manage back pain    Do not stand for long periods of time or lift heavy items  Use good posture while you stand, squat, or bend  Wear low-heeled shoes with good support  Rest may also help to relieve back pain  Ask your healthcare provider about exercises you can do to strengthen your back muscles  · Take prenatal vitamins as directed  Your need for certain vitamins and minerals, such as folic acid, increases during pregnancy  Prenatal vitamins provide some of the extra vitamins and minerals you need  Prenatal vitamins may also help to decrease the risk of certain birth defects  · Talk to your healthcare provider about exercise  Moderate exercise can help you stay fit  Your healthcare provider will help you plan an exercise program that is safe for you during pregnancy  · Do not smoke  If you smoke, it is never too late to quit  Smoking increases your risk of a miscarriage and other health problems during your pregnancy  Smoking can cause your baby to be born too early or weigh less at birth  Ask your healthcare provider for information if you need help quitting  · Do not drink alcohol  Alcohol passes from your body to your baby through the placenta  It can affect your baby's brain development and cause fetal alcohol syndrome (FAS)  FAS is a group of conditions that causes mental, behavior, and growth problems  · Talk to your healthcare provider before you take any medicines  Many medicines may harm your baby if you take them when you are pregnant  Do not take any medicines, vitamins, herbs, or supplements without first talking to your healthcare provider  Never use illegal or street drugs (such as marijuana or cocaine) while you are pregnant  Safety tips during pregnancy:   · Avoid hot tubs and saunas  Do not use a hot tub or sauna while you are pregnant, especially during your first trimester  Hot tubs and saunas may raise your baby's temperature and increase the risk of birth defects  · Avoid toxoplasmosis    This is an infection caused by eating raw meat or being around infected cat feces  It can cause birth defects, miscarriages, and other problems  Wash your hands after you touch raw meat  Make sure any meat is well-cooked before you eat it  Avoid raw eggs and unpasteurized milk  Use gloves or ask someone else to clean your cat's litter box while you are pregnant  Changes that are happening with your baby:  By 26 weeks, your baby will weigh about 2 pounds  Your baby will be about 10 inches long from the top of the head to the rump (baby's bottom)  Your baby's movements are much stronger now  Your baby's eyes are almost completely formed and can partially open  Your baby also sleeps and wakes up  What you need to know about prenatal care: Your healthcare provider will check your blood pressure and weight  You may also need the following:  · A urine test  may also be done to check for sugar and protein  These can be signs of gestational diabetes or infection  Protein in your urine may also be a sign of preeclampsia  Preeclampsia is a condition that can develop during week 20 or later of your pregnancy  It causes high blood pressure, and it can cause problems with your kidneys and other organs  · Fundal height  is a measurement of your uterus to check your baby's growth  This number is usually the same as the number of weeks that you have been pregnant  · Your baby's heart rate  will be checked  © 2017 [address] [employer] Information is for End User's use only and may not be sold, redistributed or otherwise used for commercial purposes  All illustrations and images included in CareNotes® are the copyrighted property of A [employer] A M [employer]  or [employer]  The above information is an  only  It is not intended as medical advice for individual conditions or treatments  Talk to your doctor, nurse or pharmacist before following any medical regimen to see if it is safe and effective for you

## 2020-02-03 ENCOUNTER — ROUTINE PRENATAL (OUTPATIENT)
Dept: OBGYN CLINIC | Age: 25
End: 2020-02-03
Payer: COMMERCIAL

## 2020-02-03 VITALS
DIASTOLIC BLOOD PRESSURE: 62 MMHG | HEIGHT: 65 IN | WEIGHT: 149.6 LBS | BODY MASS INDEX: 24.93 KG/M2 | SYSTOLIC BLOOD PRESSURE: 98 MMHG

## 2020-02-03 DIAGNOSIS — E63.9 INADEQUATE DIETARY INTAKE: ICD-10-CM

## 2020-02-03 DIAGNOSIS — O40.2XX0 POLYHYDRAMNIOS IN SECOND TRIMESTER, SINGLE OR UNSPECIFIED FETUS: ICD-10-CM

## 2020-02-03 DIAGNOSIS — O09.212 HIGH RISK PREGNANCY DUE TO HISTORY OF PRETERM LABOR IN SECOND TRIMESTER: ICD-10-CM

## 2020-02-03 LAB
SL AMB  POCT GLUCOSE, UA: NEGATIVE
SL AMB POCT URINE PROTEIN: POSITIVE

## 2020-02-03 PROCEDURE — 99213 OFFICE O/P EST LOW 20 MIN: CPT | Performed by: NURSE PRACTITIONER

## 2020-02-03 NOTE — ASSESSMENT & PLAN NOTE
Return OB  Denies LOF, vag blding, ctxs, cramping and reports good FM  Taking PNV/Zoloft daily as prescribed  Denies any problems today  28 wk labs ordered  Will need Tdap at next visit  "Last Weeks of Pregnancy" sheet given  Breast pump order form filled out and faxed  Reviewed SAB/PTL/Labor precautions, [address] also reviewed

## 2020-02-03 NOTE — PROGRESS NOTES
Inadequate dietary intake  WIC form filled out for pt to help supplement with Ensure 4 servings of 8 ozs of Ensure a day which they can provide for her  Also reviewed increasing hydration  Urine very concentrated  Polyhydramnios in second trimester  Next US scheduled to re-eval ERASTO level and fetal growth    High risk pregnancy due to history of  labor in second trimester  Return OB  Denies LOF, vag blding, ctxs, cramping and reports good FM  Taking PNV/Zoloft daily as prescribed  Denies any problems today  28 wk labs ordered  Will need Tdap at next visit  "Last Weeks of Pregnancy" sheet given  Breast pump order form filled out and faxed  Reviewed SAB/PTL/Labor precautions, [address] also reviewed

## 2020-02-03 NOTE — PATIENT INSTRUCTIONS
Pregnancy at 23 to 26 [facility] [address]:   What changes are happening in my body? You are now close to or at the beginning of the third trimester  The third trimester starts at 24 weeks and ends with delivery  As your baby gets larger, you may develop certain symptoms  These may include pain in your back or down the sides of your abdomen  You may also have stretch marks on your abdomen, breasts, thighs, or buttocks  You may also have constipation  How do I care for myself at this stage of my pregnancy? · Eat a variety of healthy foods  Healthy foods include fruits, vegetables, whole-grain breads, low-fat dairy foods, beans, lean meats, and fish  Drink liquids as directed  Ask how much liquid to drink each day and which liquids are best for you  Limit caffeine to less than 200 milligrams each day  Limit your intake of fish to 2 servings each week  Choose fish low in mercury such as canned light tuna, shrimp, salmon, cod, or tilapia  Do not  eat fish high in mercury such as swordfish, tilefish, aranza mackerel, and shark  · Manage back pain  Do not stand for long periods of time or lift heavy items  Use good posture while you stand, squat, or bend  Wear low-heeled shoes with good support  Rest may also help to relieve back pain  Ask your healthcare provider about exercises you can do to strengthen your back muscles  · Take prenatal vitamins as directed  Your need for certain vitamins and minerals, such as folic acid, increases during pregnancy  Prenatal vitamins provide some of the extra vitamins and minerals you need  Prenatal vitamins may also help to decrease the risk of certain birth defects  · Talk to your healthcare provider about exercise  Moderate exercise can help you stay fit  Your healthcare provider will help you plan an exercise program that is safe for you during pregnancy  · Do not smoke  If you smoke, it is never too late to quit   Smoking increases your risk of a miscarriage and other health problems during your pregnancy  Smoking can cause your baby to be born too early or weigh less at birth  Ask your healthcare provider for information if you need help quitting  · Do not drink alcohol  Alcohol passes from your body to your baby through the placenta  It can affect your baby's brain development and cause fetal alcohol syndrome (FAS)  FAS is a group of conditions that causes mental, behavior, and growth problems  · Talk to your healthcare provider before you take any medicines  Many medicines may harm your baby if you take them when you are pregnant  Do not take any medicines, vitamins, herbs, or supplements without first talking to your healthcare provider  Never use illegal or street drugs (such as marijuana or cocaine) while you are pregnant  What are some safety tips during pregnancy? · Avoid hot tubs and saunas  Do not use a hot tub or sauna while you are pregnant, especially during your first trimester  Hot tubs and saunas may raise your baby's temperature and increase the risk of birth defects  · Avoid toxoplasmosis  This is an infection caused by eating raw meat or being around infected cat feces  It can cause birth defects, miscarriages, and other problems  Wash your hands after you touch raw meat  Make sure any meat is well-cooked before you eat it  Avoid raw eggs and unpasteurized milk  Use gloves or ask someone else to clean your cat's litter box while you are pregnant  What changes are happening with my baby? By 26 weeks, your baby will weigh about 2 pounds  Your baby will be about 10 inches long from the top of the head to the rump (baby's bottom)  Your baby's movements are much stronger now  Your baby's eyes are almost completely formed and can partially open  Your baby also sleeps and wakes up  What do I need to know about prenatal care? Your healthcare provider will check your blood pressure and weight   You may also need the following:  · A urine test  may also be done to check for sugar and protein  These can be signs of gestational diabetes or infection  Protein in your urine may also be a sign of preeclampsia  Preeclampsia is a condition that can develop during week 20 or later of your pregnancy  It causes high blood pressure, and it can cause problems with your kidneys and other organs  · Fundal height  is a measurement of your uterus to check your baby's growth  This number is usually the same as the number of weeks that you have been pregnant  · Your baby's heart rate  will be checked  When should I seek immediate care? · You develop a severe headache that does not go away  · You have new or increased vision changes, such as blurred or spotted vision  · You have new or increased swelling in your face or hands  · You have vaginal spotting or bleeding  · Your water broke or you feel warm water gushing or trickling from your vagina  When should I contact my healthcare provider? · You have abdominal cramps, pressure, or tightening  · You have a change in vaginal discharge  · You have light bleeding  · You have chills or a fever  · You have vaginal itching, burning, or pain  · You have yellow, green, white, or foul-smelling vaginal discharge  · You have pain or burning when you urinate, less urine than usual, or pink or bloody urine  · You have questions or concerns about your condition or care  CARE AGREEMENT:   You have the right to help plan your care  Learn about your health condition and how it may be treated  Discuss treatment options with your caregivers to decide what care you want to receive  You always have the right to refuse treatment  The above information is an  only  It is not intended as medical advice for individual conditions or treatments   Talk to your doctor, nurse or pharmacist before following any medical regimen to see if it is safe and effective for you   © 2017 [facility] [facility] Information is for End User's use only and may not be sold, redistributed or otherwise used for commercial purposes  All illustrations and images included in CareNotes® are the copyrighted property of A [employer] A M , Inc  or [employer]by Sweet

## 2020-02-03 NOTE — PROGRESS NOTES
Patient is here for routine prenatal visit  24 w 1 d    GFM  Patient denies any loss of fluid or bleeding  POCT urine today is positive 1 + with negative for glucose  Patient indicates she is having  Poor appetite and has a form from [facility] to be completed so she can receive supplement Ensure  Patient will not be breast feeding but requested pump

## 2020-02-03 NOTE — ASSESSMENT & PLAN NOTE
[address] form filled out for pt to help supplement with Ensure 4 servings of 8 ozs of Ensure a day which they can provide for her  Also reviewed increasing hydration  Urine very concentrated

## 2020-02-18 ENCOUNTER — TELEPHONE (OUTPATIENT)
Dept: OBGYN CLINIC | Facility: CLINIC | Age: 25
End: 2020-02-18

## 2020-02-18 NOTE — TELEPHONE ENCOUNTER
Pt was here few weeks ago and was given cream for bacteria infection but the cream was irritated her and not working  So she is looking for something else to help clear it up please call  Pt is 26 weeks as well

## 2020-02-18 NOTE — TELEPHONE ENCOUNTER
Spoke with pt, she is 26 weeks and was rx'd flagyl   1/24 by am   She has finished the med, but brown yellow disch and fishy odor continue  Pt states that she always requires more than 1 rx for bv or uti's  [facility] is [facility]

## 2020-02-19 NOTE — TELEPHONE ENCOUNTER
Pt contacted and advised as directed  Pt scheduled for 02/24/2020 with jennifer at 3:20pm was the soonest she should come in due to her schedule

## 2020-02-19 NOTE — TELEPHONE ENCOUNTER
Please have her come in for acute appointment if continuing to have symptoms for evaluation    Thank you! -AMM

## 2020-02-21 ENCOUNTER — TELEPHONE (OUTPATIENT)
Dept: OBGYN CLINIC | Facility: CLINIC | Age: 25
End: 2020-02-21

## 2020-02-21 NOTE — TELEPHONE ENCOUNTER
Joselito Alvarado called from nutrition services to request an insurance referral  She can be reached at [phone]

## 2020-02-21 NOTE — TELEPHONE ENCOUNTER
Pt said she doesn't need referrals to go to specialist offices   Asked for pt's pcp she said he is in [place], I advised about the referral she may need and she said she really doesn't find the need to go to nutrician serv because she is now putting weight on

## 2020-03-02 ENCOUNTER — ROUTINE PRENATAL (OUTPATIENT)
Dept: OBGYN CLINIC | Facility: CLINIC | Age: 25
End: 2020-03-02
Payer: COMMERCIAL

## 2020-03-02 ENCOUNTER — HOSPITAL ENCOUNTER (INPATIENT)
Facility: HOSPITAL | Age: 25
LOS: 1 days | Discharge: LEFT BEFORE DISCHARGE | DRG: 563 | End: 2020-03-03
Attending: OBSTETRICS & GYNECOLOGY | Admitting: OBSTETRICS & GYNECOLOGY
Payer: COMMERCIAL

## 2020-03-02 VITALS
SYSTOLIC BLOOD PRESSURE: 110 MMHG | RESPIRATION RATE: 14 BRPM | WEIGHT: 154.8 LBS | DIASTOLIC BLOOD PRESSURE: 70 MMHG | HEIGHT: 65 IN | BODY MASS INDEX: 25.79 KG/M2

## 2020-03-02 DIAGNOSIS — O09.899 HISTORY OF PRETERM DELIVERY, CURRENTLY PREGNANT: ICD-10-CM

## 2020-03-02 DIAGNOSIS — O23.42 URINARY TRACT INFECTION IN MOTHER DURING SECOND TRIMESTER OF PREGNANCY: ICD-10-CM

## 2020-03-02 DIAGNOSIS — O99.419 MITRAL VALVE PROLAPSE OF MOTHER DURING PREGNANCY: ICD-10-CM

## 2020-03-02 DIAGNOSIS — O09.212 HIGH RISK PREGNANCY DUE TO HISTORY OF PRETERM LABOR IN SECOND TRIMESTER: Primary | ICD-10-CM

## 2020-03-02 DIAGNOSIS — I34.1 MITRAL VALVE PROLAPSE OF MOTHER DURING PREGNANCY: ICD-10-CM

## 2020-03-02 DIAGNOSIS — B37.3 VULVOVAGINAL CANDIDIASIS: ICD-10-CM

## 2020-03-02 DIAGNOSIS — O40.2XX0 POLYHYDRAMNIOS IN SECOND TRIMESTER, SINGLE OR UNSPECIFIED FETUS: ICD-10-CM

## 2020-03-02 DIAGNOSIS — D57.3 SICKLE CELL TRAIT (HCC): ICD-10-CM

## 2020-03-02 LAB
ABO GROUP BLD: NORMAL
ALBUMIN SERPL BCP-MCNC: 2.7 G/DL (ref 3.5–5)
ALP SERPL-CCNC: 104 U/L (ref 46–116)
ALT SERPL W P-5'-P-CCNC: 14 U/L (ref 12–78)
AMPHETAMINES SERPL QL SCN: NEGATIVE
ANION GAP SERPL CALCULATED.3IONS-SCNC: 9 MMOL/L (ref 4–13)
AST SERPL W P-5'-P-CCNC: 14 U/L (ref 5–45)
BACTERIA UR QL AUTO: ABNORMAL /HPF
BARBITURATES UR QL: NEGATIVE
BASOPHILS # BLD AUTO: 0.03 THOUSANDS/ΜL (ref 0–0.1)
BASOPHILS NFR BLD AUTO: 0 % (ref 0–1)
BENZODIAZ UR QL: NEGATIVE
BILIRUB SERPL-MCNC: 0.35 MG/DL (ref 0.2–1)
BILIRUB UR QL STRIP: NEGATIVE
BLD GP AB SCN SERPL QL: NEGATIVE
BUN SERPL-MCNC: 4 MG/DL (ref 5–25)
BV WHIFF TEST VAG QL: ABNORMAL
CALCIUM SERPL-MCNC: 8.5 MG/DL (ref 8.3–10.1)
CHLORIDE SERPL-SCNC: 104 MMOL/L (ref 100–108)
CLARITY UR: CLEAR
CLUE CELLS SPEC QL WET PREP: ABNORMAL
CO2 SERPL-SCNC: 26 MMOL/L (ref 21–32)
COCAINE UR QL: NEGATIVE
COLOR UR: YELLOW
CREAT SERPL-MCNC: 0.6 MG/DL (ref 0.6–1.3)
EOSINOPHIL # BLD AUTO: 0.11 THOUSAND/ΜL (ref 0–0.61)
EOSINOPHIL NFR BLD AUTO: 1 % (ref 0–6)
ERYTHROCYTE [DISTWIDTH] IN BLOOD BY AUTOMATED COUNT: 13.2 % (ref 11.6–15.1)
GFR SERPL CREATININE-BSD FRML MDRD: 145 ML/MIN/1.73SQ M
GLUCOSE SERPL-MCNC: 84 MG/DL (ref 65–140)
GLUCOSE UR STRIP-MCNC: NEGATIVE MG/DL
HCT VFR BLD AUTO: 30.6 % (ref 34.8–46.1)
HGB BLD-MCNC: 10.2 G/DL (ref 11.5–15.4)
HGB UR QL STRIP.AUTO: NEGATIVE
IMM GRANULOCYTES # BLD AUTO: 0.1 THOUSAND/UL (ref 0–0.2)
IMM GRANULOCYTES NFR BLD AUTO: 1 % (ref 0–2)
KETONES UR STRIP-MCNC: NEGATIVE MG/DL
LEUKOCYTE ESTERASE UR QL STRIP: ABNORMAL
LYMPHOCYTES # BLD AUTO: 1.99 THOUSANDS/ΜL (ref 0.6–4.47)
LYMPHOCYTES NFR BLD AUTO: 23 % (ref 14–44)
MCH RBC QN AUTO: 28.7 PG (ref 26.8–34.3)
MCHC RBC AUTO-ENTMCNC: 33.3 G/DL (ref 31.4–37.4)
MCV RBC AUTO: 86 FL (ref 82–98)
METHADONE UR QL: NEGATIVE
MONOCYTES # BLD AUTO: 0.76 THOUSAND/ΜL (ref 0.17–1.22)
MONOCYTES NFR BLD AUTO: 9 % (ref 4–12)
NEUTROPHILS # BLD AUTO: 5.87 THOUSANDS/ΜL (ref 1.85–7.62)
NEUTS SEG NFR BLD AUTO: 66 % (ref 43–75)
NITRITE UR QL STRIP: NEGATIVE
NON-SQ EPI CELLS URNS QL MICRO: ABNORMAL /HPF
NRBC BLD AUTO-RTO: 0 /100 WBCS
OPIATES UR QL SCN: NEGATIVE
PCP UR QL: NEGATIVE
PH SMN: 4 [PH]
PH UR STRIP.AUTO: 6 [PH]
PLATELET # BLD AUTO: 269 THOUSANDS/UL (ref 149–390)
PMV BLD AUTO: 11.3 FL (ref 8.9–12.7)
POTASSIUM SERPL-SCNC: 3.5 MMOL/L (ref 3.5–5.3)
PROT SERPL-MCNC: 7.2 G/DL (ref 6.4–8.2)
PROT UR STRIP-MCNC: NEGATIVE MG/DL
RBC # BLD AUTO: 3.55 MILLION/UL (ref 3.81–5.12)
RBC #/AREA URNS AUTO: ABNORMAL /HPF
RH BLD: POSITIVE
SL AMB  POCT GLUCOSE, UA: NEGATIVE
SL AMB POCT URINE PROTEIN: NEGATIVE
SODIUM SERPL-SCNC: 139 MMOL/L (ref 136–145)
SP GR UR STRIP.AUTO: <=1.005 (ref 1–1.03)
SPECIMEN EXPIRATION DATE: NORMAL
T VAGINALIS VAG QL WET PREP: ABNORMAL
THC UR QL: NEGATIVE
UROBILINOGEN UR QL STRIP.AUTO: 1 E.U./DL
WBC # BLD AUTO: 8.86 THOUSAND/UL (ref 4.31–10.16)
WBC #/AREA URNS AUTO: ABNORMAL /HPF
YEAST VAG QL WET PREP: ABNORMAL

## 2020-03-02 PROCEDURE — 87591 N.GONORRHOEAE DNA AMP PROB: CPT | Performed by: OBSTETRICS & GYNECOLOGY

## 2020-03-02 PROCEDURE — 87653 STREP B DNA AMP PROBE: CPT | Performed by: OBSTETRICS & GYNECOLOGY

## 2020-03-02 PROCEDURE — 86850 RBC ANTIBODY SCREEN: CPT | Performed by: OBSTETRICS & GYNECOLOGY

## 2020-03-02 PROCEDURE — 99221 1ST HOSP IP/OBS SF/LOW 40: CPT | Performed by: OBSTETRICS & GYNECOLOGY

## 2020-03-02 PROCEDURE — 86901 BLOOD TYPING SEROLOGIC RH(D): CPT | Performed by: OBSTETRICS & GYNECOLOGY

## 2020-03-02 PROCEDURE — 87210 SMEAR WET MOUNT SALINE/INK: CPT | Performed by: STUDENT IN AN ORGANIZED HEALTH CARE EDUCATION/TRAINING PROGRAM

## 2020-03-02 PROCEDURE — 87086 URINE CULTURE/COLONY COUNT: CPT | Performed by: OBSTETRICS & GYNECOLOGY

## 2020-03-02 PROCEDURE — 85025 COMPLETE CBC W/AUTO DIFF WBC: CPT | Performed by: OBSTETRICS & GYNECOLOGY

## 2020-03-02 PROCEDURE — 87491 CHLMYD TRACH DNA AMP PROBE: CPT | Performed by: OBSTETRICS & GYNECOLOGY

## 2020-03-02 PROCEDURE — 4A1HXCZ MONITORING OF PRODUCTS OF CONCEPTION, CARDIAC RATE, EXTERNAL APPROACH: ICD-10-PCS | Performed by: OBSTETRICS & GYNECOLOGY

## 2020-03-02 PROCEDURE — 86900 BLOOD TYPING SEROLOGIC ABO: CPT | Performed by: OBSTETRICS & GYNECOLOGY

## 2020-03-02 PROCEDURE — 80307 DRUG TEST PRSMV CHEM ANLYZR: CPT | Performed by: OBSTETRICS & GYNECOLOGY

## 2020-03-02 PROCEDURE — 86592 SYPHILIS TEST NON-TREP QUAL: CPT | Performed by: OBSTETRICS & GYNECOLOGY

## 2020-03-02 PROCEDURE — 80053 COMPREHEN METABOLIC PANEL: CPT | Performed by: OBSTETRICS & GYNECOLOGY

## 2020-03-02 PROCEDURE — 81001 URINALYSIS AUTO W/SCOPE: CPT | Performed by: OBSTETRICS & GYNECOLOGY

## 2020-03-02 PROCEDURE — 99214 OFFICE O/P EST MOD 30 MIN: CPT | Performed by: OBSTETRICS & GYNECOLOGY

## 2020-03-02 PROCEDURE — NC001 PR NO CHARGE: Performed by: OBSTETRICS & GYNECOLOGY

## 2020-03-02 PROCEDURE — 99213 OFFICE O/P EST LOW 20 MIN: CPT | Performed by: STUDENT IN AN ORGANIZED HEALTH CARE EDUCATION/TRAINING PROGRAM

## 2020-03-02 RX ORDER — MAGNESIUM SULFATE HEPTAHYDRATE 40 MG/ML
2 INJECTION, SOLUTION INTRAVENOUS ONCE
Status: COMPLETED | OUTPATIENT
Start: 2020-03-02 | End: 2020-03-02

## 2020-03-02 RX ORDER — TRISODIUM CITRATE DIHYDRATE AND CITRIC ACID MONOHYDRATE 500; 334 MG/5ML; MG/5ML
30 SOLUTION ORAL
Status: DISCONTINUED | OUTPATIENT
Start: 2020-03-02 | End: 2020-03-03 | Stop reason: HOSPADM

## 2020-03-02 RX ORDER — CALCIUM CARBONATE 200(500)MG
1000 TABLET,CHEWABLE ORAL DAILY PRN
Status: DISCONTINUED | OUTPATIENT
Start: 2020-03-02 | End: 2020-03-03 | Stop reason: HOSPADM

## 2020-03-02 RX ORDER — DOCUSATE SODIUM 100 MG/1
100 CAPSULE, LIQUID FILLED ORAL 2 TIMES DAILY
Status: DISCONTINUED | OUTPATIENT
Start: 2020-03-02 | End: 2020-03-03 | Stop reason: HOSPADM

## 2020-03-02 RX ORDER — BETAMETHASONE SODIUM PHOSPHATE AND BETAMETHASONE ACETATE 3; 3 MG/ML; MG/ML
12 INJECTION, SUSPENSION INTRA-ARTICULAR; INTRALESIONAL; INTRAMUSCULAR; SOFT TISSUE EVERY 24 HOURS
Status: DISCONTINUED | OUTPATIENT
Start: 2020-03-02 | End: 2020-03-03 | Stop reason: HOSPADM

## 2020-03-02 RX ORDER — ONDANSETRON 2 MG/ML
4 INJECTION INTRAMUSCULAR; INTRAVENOUS EVERY 8 HOURS PRN
Status: DISCONTINUED | OUTPATIENT
Start: 2020-03-02 | End: 2020-03-03 | Stop reason: HOSPADM

## 2020-03-02 RX ORDER — SERTRALINE HYDROCHLORIDE 25 MG/1
25 TABLET, FILM COATED ORAL DAILY
Status: DISCONTINUED | OUTPATIENT
Start: 2020-03-03 | End: 2020-03-03 | Stop reason: HOSPADM

## 2020-03-02 RX ORDER — ACETAMINOPHEN 325 MG/1
650 TABLET ORAL EVERY 4 HOURS PRN
Status: DISCONTINUED | OUTPATIENT
Start: 2020-03-02 | End: 2020-03-03 | Stop reason: HOSPADM

## 2020-03-02 RX ORDER — MAGNESIUM SULFATE HEPTAHYDRATE 40 MG/ML
2 INJECTION, SOLUTION INTRAVENOUS CONTINUOUS
Status: DISCONTINUED | OUTPATIENT
Start: 2020-03-02 | End: 2020-03-03

## 2020-03-02 RX ORDER — MAGNESIUM SULFATE HEPTAHYDRATE 40 MG/ML
4 INJECTION, SOLUTION INTRAVENOUS ONCE
Status: COMPLETED | OUTPATIENT
Start: 2020-03-02 | End: 2020-03-02

## 2020-03-02 RX ORDER — INDOMETHACIN 25 MG/1
25 CAPSULE ORAL EVERY 6 HOURS
Status: DISCONTINUED | OUTPATIENT
Start: 2020-03-03 | End: 2020-03-03 | Stop reason: HOSPADM

## 2020-03-02 RX ORDER — INDOMETHACIN 25 MG/1
50 CAPSULE ORAL ONCE
Status: COMPLETED | OUTPATIENT
Start: 2020-03-02 | End: 2020-03-02

## 2020-03-02 RX ORDER — SODIUM CHLORIDE, SODIUM LACTATE, POTASSIUM CHLORIDE, CALCIUM CHLORIDE 600; 310; 30; 20 MG/100ML; MG/100ML; MG/100ML; MG/100ML
125 INJECTION, SOLUTION INTRAVENOUS CONTINUOUS
Status: DISCONTINUED | OUTPATIENT
Start: 2020-03-02 | End: 2020-03-03 | Stop reason: HOSPADM

## 2020-03-02 RX ORDER — SIMETHICONE 80 MG
80 TABLET,CHEWABLE ORAL 4 TIMES DAILY PRN
Status: DISCONTINUED | OUTPATIENT
Start: 2020-03-02 | End: 2020-03-03 | Stop reason: HOSPADM

## 2020-03-02 RX ADMIN — INDOMETHACIN 50 MG: 25 CAPSULE ORAL at 21:36

## 2020-03-02 RX ADMIN — MAGNESIUM SULFATE HEPTAHYDRATE 2 G: 40 INJECTION, SOLUTION INTRAVENOUS at 22:28

## 2020-03-02 RX ADMIN — BETAMETHASONE SODIUM PHOSPHATE AND BETAMETHASONE ACETATE 12 MG: 3; 3 INJECTION, SUSPENSION INTRA-ARTICULAR; INTRALESIONAL; INTRAMUSCULAR at 21:37

## 2020-03-02 RX ADMIN — DOCUSATE SODIUM 100 MG: 100 CAPSULE, LIQUID FILLED ORAL at 22:28

## 2020-03-02 RX ADMIN — MAGNESIUM SULFATE IN WATER 4 G: 40 INJECTION, SOLUTION INTRAVENOUS at 22:04

## 2020-03-02 RX ADMIN — PENICILLIN G POTASSIUM 5 MILLION UNITS: 5000000 POWDER, FOR SOLUTION INTRAMUSCULAR; INTRAPLEURAL; INTRATHECAL; INTRAVENOUS at 21:41

## 2020-03-02 RX ADMIN — MAGNESIUM SULFATE IN WATER 2 G/HR: 40 INJECTION, SOLUTION INTRAVENOUS at 22:48

## 2020-03-02 RX ADMIN — SODIUM CHLORIDE, SODIUM LACTATE, POTASSIUM CHLORIDE, AND CALCIUM CHLORIDE 125 ML/HR: .6; .31; .03; .02 INJECTION, SOLUTION INTRAVENOUS at 20:45

## 2020-03-02 NOTE — PROGRESS NOTES
03PI [id number] at 28wk2d, here for return OB visit  Feeling well overall and without concerns  Good FM  Denies LOF, VB, contractions  Does note "tightening" whenever she walks around or is active throughout this weekend and today  She does not think these are contractions or labor, but notes that her stomach gets hard  Improves with rest      Also with vaginal discharge and itching  Did not feel that flagyl previously prescribed was helpful  Problem List Items Addressed This Visit        Cardiovascular and Mediastinum    Mitral valve prolapse of mother during pregnancy     S/p cardiology 2019, no further intervention necessary             Genitourinary    Urinary tract infection in mother during second trimester of pregnancy       Other    High risk pregnancy due to history of  labor in second trimester - Primary     -declined [place]  -s/p cervical length measurements at [facility]  -with tightening and likely cervical dilation given prior cervical length >3cm and now /-5 on exam  Recommended going to labor and delivery for evaluation, possible steroids, magnesium   Pt understands, will proceed straight there  -discussed miconazole cream for yeast         Relevant Orders    POCT urine dip (Completed)    Polyhydramnios in second trimester     Seen on recent fetal echo   -recommend 4 wk interval evaluation         Sickle cell trait [facility])     Father of Baby has never been tested, recommended this STRONGLY today           Other Visit Diagnoses     Vulvovaginal candidiasis        Relevant Orders    POCT wet mount (Completed)

## 2020-03-02 NOTE — ASSESSMENT & PLAN NOTE
-declined [place]  -s/p cervical length measurements at [facility]  -with tightening and likely cervical dilation given prior cervical length >3cm and now 1/80/-5 on exam  Recommended going to labor and delivery for evaluation, possible steroids, magnesium   Pt understands, will proceed straight there  -discussed miconazole cream for yeast

## 2020-03-03 VITALS
TEMPERATURE: 98 F | DIASTOLIC BLOOD PRESSURE: 67 MMHG | OXYGEN SATURATION: 100 % | HEART RATE: 99 BPM | SYSTOLIC BLOOD PRESSURE: 116 MMHG | RESPIRATION RATE: 18 BRPM | WEIGHT: 155 LBS | BODY MASS INDEX: 25.83 KG/M2 | HEIGHT: 65 IN

## 2020-03-03 PROBLEM — Z3A.28 28 WEEKS GESTATION OF PREGNANCY: Status: ACTIVE | Noted: 2020-03-03

## 2020-03-03 LAB — RPR SER QL: NORMAL

## 2020-03-03 PROCEDURE — 59025 FETAL NON-STRESS TEST: CPT | Performed by: OBSTETRICS & GYNECOLOGY

## 2020-03-03 PROCEDURE — 99253 IP/OBS CNSLTJ NEW/EST LOW 45: CPT | Performed by: OBSTETRICS & GYNECOLOGY

## 2020-03-03 PROCEDURE — 76816 OB US FOLLOW-UP PER FETUS: CPT | Performed by: OBSTETRICS & GYNECOLOGY

## 2020-03-03 PROCEDURE — NC001 PR NO CHARGE: Performed by: OBSTETRICS & GYNECOLOGY

## 2020-03-03 RX ADMIN — SODIUM CHLORIDE 2.5 MILLION UNITS: 9 INJECTION, SOLUTION INTRAVENOUS at 09:11

## 2020-03-03 RX ADMIN — INDOMETHACIN 25 MG: 25 CAPSULE ORAL at 09:11

## 2020-03-03 RX ADMIN — INDOMETHACIN 25 MG: 25 CAPSULE ORAL at 05:21

## 2020-03-03 RX ADMIN — MAGNESIUM SULFATE IN WATER 2 G/HR: 40 INJECTION, SOLUTION INTRAVENOUS at 09:16

## 2020-03-03 RX ADMIN — SODIUM CHLORIDE 2.5 MILLION UNITS: 9 INJECTION, SOLUTION INTRAVENOUS at 04:45

## 2020-03-03 RX ADMIN — DOCUSATE SODIUM 100 MG: 100 CAPSULE, LIQUID FILLED ORAL at 09:15

## 2020-03-03 RX ADMIN — SODIUM CHLORIDE, SODIUM LACTATE, POTASSIUM CHLORIDE, AND CALCIUM CHLORIDE 125 ML/HR: .6; .31; .03; .02 INJECTION, SOLUTION INTRAVENOUS at 05:27

## 2020-03-03 NOTE — PLAN OF CARE
Problem: ANTEPARTUM  Goal: Maintain pregnancy as long as maternal and/or fetal condition is stable  Description  INTERVENTIONS:  - Maternal surveillance  - Fetal surveillance  - Monitor uterine activity  - Medications as ordered  - Bedrest  Outcome: Progressing     Problem: PAIN - ADULT  Goal: Verbalizes/displays adequate comfort level or baseline comfort level  Description  Interventions:  - Encourage patient to monitor pain and request assistance  - Assess pain using appropriate pain scale  - Administer analgesics based on type and severity of pain and evaluate response  - Implement non-pharmacological measures as appropriate and evaluate response  - Consider cultural and social influences on pain and pain management  - Notify physician/advanced practitioner if interventions unsuccessful or patient reports new pain  Outcome: Progressing     Problem: INFECTION - ADULT  Goal: Absence or prevention of progression during hospitalization  Description  INTERVENTIONS:  - Assess and monitor for signs and symptoms of infection  - Monitor lab/diagnostic results  - Monitor all insertion sites, i e  indwelling lines, tubes, and drains  - Monitor endotracheal if appropriate and nasal secretions for changes in amount and color  - [place] appropriate cooling/warming therapies per order  - Administer medications as ordered  - Instruct and encourage patient and family to use good hand hygiene technique  - Identify and instruct in appropriate isolation precautions for identified infection/condition  Outcome: Progressing  Goal: Absence of fever/infection during neutropenic period  Description  INTERVENTIONS:  - Monitor WBC    Outcome: Progressing     Problem: SAFETY ADULT  Goal: Patient will remain free of falls  Description  INTERVENTIONS:  - Assess patient frequently for physical needs  -  Identify cognitive and physical deficits and behaviors that affect risk of falls    -  [place] fall precautions as indicated by assessment   - Educate patient/family on patient safety including physical limitations  - Instruct patient to call for assistance with activity based on assessment  - Modify environment to reduce risk of injury  - Consider OT/PT consult to assist with strengthening/mobility  Outcome: Progressing  Goal: Maintain or return to baseline ADL function  Description  INTERVENTIONS:  -  Assess patient's ability to carry out ADLs; assess patient's baseline for ADL function and identify physical deficits which impact ability to perform ADLs (bathing, care of mouth/teeth, toileting, grooming, dressing, etc )  - Assess/evaluate cause of self-care deficits   - Assess range of motion  - Assess patient's mobility; develop plan if impaired  - Assess patient's need for assistive devices and provide as appropriate  - Encourage maximum independence but intervene and supervise when necessary  - Involve family in performance of ADLs  - Assess for home care needs following discharge   - Consider OT consult to assist with ADL evaluation and planning for discharge  - Provide patient education as appropriate  Outcome: Progressing  Goal: Maintain or return mobility status to optimal level  Description  INTERVENTIONS:  - Assess patient's baseline mobility status (ambulation, transfers, stairs, etc )    - Identify cognitive and physical deficits and behaviors that affect mobility  - Identify mobility aids required to assist with transfers and/or ambulation (gait belt, sit-to-stand, lift, walker, cane, etc )  - [place] fall precautions as indicated by assessment  - Record patient progress and toleration of activity level on Mobility SBAR; progress patient to next Phase/Stage  - Instruct patient to call for assistance with activity based on assessment  - Consider rehabilitation consult to assist with strengthening/weightbearing, etc   Outcome: Progressing     Problem: Knowledge Deficit  Goal: Patient/family/caregiver demonstrates understanding of disease process, treatment plan, medications, and discharge instructions  Description  Complete learning assessment and assess knowledge base    Interventions:  - Provide teaching at level of understanding  - Provide teaching via preferred learning methods  Outcome: Progressing     Problem: DISCHARGE PLANNING  Goal: Discharge to home or other facility with appropriate resources  Description  INTERVENTIONS:  - Identify barriers to discharge w/patient and caregiver  - Arrange for needed discharge resources and transportation as appropriate  - Identify discharge learning needs (meds, wound care, etc )  - Arrange for interpretive services to assist at discharge as needed  - Refer to Case Management Department for coordinating discharge planning if the patient needs post-hospital services based on physician/advanced practitioner order or complex needs related to functional status, cognitive ability, or social support system  Outcome: Progressing

## 2020-03-03 NOTE — UTILIZATION REVIEW
Notification of Inpatient Admission/Inpatient Authorization Request   This is a Notification of Inpatient Admission for [address]  Be advised that this patient was admitted to our facility under Inpatient Status  Contact Kathryn Zamudio at [phone] for additional admission information  [facility] DE[facility]  DEDICATED -[phone]  Patient Name:   Carine Castrejon   YOB: 1995       [address] [facility]   [address]:   [address]  Tax ID: [id number]  NPI: [id number] Attending Provider/NPI: Esau Carey Md [[id number]]   Place of Service Code: 24     Place of Service Name:  Inpatient Hospital   Start Date: 3/2/20 2008     Discharge Date & Time: 3/3/2020 12:49 PM    Type of Admission: Inpatient Status Discharge Disposition   (if discharged): Left against medical advice or discontinued care   Patient Diagnoses: 28 weeks gestation of pregnancy [Z3A 28]     Orders: Admission Orders (From admission, onward)     Ordered        03/02/20 2008  Inpatient Admission  Once                    Assigned Utilization Review Contact: Terra [place]ory  Utilization   Network Utilization Review Department  Phone: [phone]; Fax [phone]  Email: [email or web address]   ATTENTION PAYERS: Please call the assigned Utilization  directly with any questions or concerns ALL voicemails in the department are confidential  Send all requests for admission clinical reviews, approved or denied determinations and any other requests to dedicated fax number belonging to the campus where the patient is receiving treatment

## 2020-03-03 NOTE — PROGRESS NOTES
Progress Note - Maternal-Fetal Medicine   Calli Holt 30 y o  female MRN: [id number]  Unit/Bed#: -01 Encounter: [id number]    Assessment:  31yo [id number] at 28w3d with abdominal cramping and a shortened cervix, hospital day #2    Plan:   labor:  - Cont betamethasone to complete course today  Indocin to remain until betamethasone complete  - Plan to discontinue magnesium at 12hr if pt remains comfortable  - Plan to discontinue PCN at 12hr if pt remains comfortable  - Pt has undergone several cervical examinations and has remained unchanged  This was not repeated this AM as pt reported no recent contractions  - Plan to advance diet today  - Possible fetal growth scan today; plan to discuss with attending physician Dr Myah Acevedo  - Currently on continuous fetal monitoring; FHT has remained within normal baseline with moderate variability, can consider decreasing to NST TID    Subjective/Objective   Chief Complaint: I feel a lot better    Subjective: Pt reports feeling well today  She reports she felt three contractions overnight, none of which were recent  Ambulating  Tolerating PO  Voiding without difficulty  +Fetal movement  Denies vaginal bleeding, leaking of fluid, current cramping or discomfort  Objective:   Vitals: Blood pressure 105/68, pulse 100, temperature 97 7 °F (36 5 °C), temperature source Oral, resp  rate 16, height 5' 5" (1 651 m), weight 70 3 kg (155 lb), last menstrual period 2019, SpO2 99 %, not currently breastfeeding  ,Body mass index is 25 79 kg/m²  Intake/Output Summary (Last 24 hours) at 3/3/2020 0804  Last data filed at 3/3/2020 7016  Gross per 24 hour   Intake 1000 ml   Output --   Net 1000 ml       Invasive Devices     Peripheral Intravenous Line            Peripheral IV 20 Right Antecubital less than 1 day                Physical Exam:   Gen: AaOx3, NAD, pleasant, cooperative  Pulm: Breathing comfortably on room air   No increased work of breathing, speaking in complete sentences  Abd: Gravid but otherwise soft, nontender, nondistended  : Size approximately equal to dates  Extremities: No edema, nontender, able to move all extremities without difficulty      FHT: 125-135bpm, moderate variability, +accels, -decels    Lab, Imaging and other studies: I have personally reviewed pertinent reports              Erasmo Donis DO  OB/GYN, PGY4  3/3/2020, 10:23 AM

## 2020-03-03 NOTE — CONSULTS
History & Physical - OB/GYN   Mariah Torres 30 y o  female MRN: [id number]  Unit/Bed#: LD TRIAGE 3- Encounter: [id number]    Mariah Torres is a patient of [address]    Chief complaint:  "My belly hurts"    HPI:  Mariah Torres is a 30 y o  [id number] female with an GONZALES of 2020, by Ultrasound at 28w2d weeks gestation who is being admitted for concern for  contractions and  labor  She has a history of a prior delivery at [place] [address] in 2016 vaginally after SROM  Her first delivery was in  vaginally at 40w  Both of these deliveries were otherwise uncomplicated  She denies a history of gestational HTN, pre-eclampsia, or diabetes  She has sickle cell disease, the FOB has not been tested  She also has known bileaflet mitral valve prolapse, she has seen cardiology 19 and echo was otherwise within normal limits  Of note, she reports she was transfused 2u of blood due to "anemia" in pregnancy  It appears that this may have been after delivery of her last child in  following her 32w delivery       Contractions:  present  Fetal movement:  present  Vaginal bleeding:   none  Leaking of fluid:  none    Pregnancy Complications:   contractions  Polyhydramnios (LVP 8 1cm 20, CL 3 24cm)  Sickle cell trait  Hx of prior  delivery at 32w  Mitral valve prolapse  Hx of postpartum depression  Hx of blood transfusion for anemia    PMH:  Past Medical History:   Diagnosis Date    Abnormal Pap smear of cervix 2017    Anemia     Anxiety     Depression     History of blood transfusion 2016    x2 units    Miscarriage     16wk    Mitral valve prolapse 2016    Post partum depression 2016    she states she gave her mother custody of this son    Sickle cell trait ([facility] [address] )     Urinary tract infection        PSH:  Past Surgical History:   Procedure Laterality Date    DILATION AND EVACUATION      16wk-       Social Hx:  Denies EtOH, cigarette, and recreational drug use in pregnancy    OB Hx:  OB History    Para Term  AB Living   4 2 1 1 1 2   SAB TAB Ectopic Multiple Live Births   1 0 0 0 2      # Outcome Date GA Lbr Vamsi/2nd Weight Sex Delivery Anes PTL Lv   4 Current            3  10/07/16 32w0d  1361 g (3 lb) M Vag-Spont   [facility]      Name: Gio    2 Term 14 40w2d  2977 g (6 lb 9 oz) M Vag-Spont   [facility]      Name: Dakota Proper    1 SAB  16w0d       FD      Birth Comments: had a D&E after falling and eval in ER found out pregnant but not viaable        Meds:  No current facility-administered medications on file prior to encounter  Current Outpatient Medications on File Prior to Encounter   Medication Sig Dispense Refill    acetaminophen (TYLENOL) 500 mg tablet Take 325 mg by mouth every 6 (six) hours as needed      Prenatal Multivit-Min-Fe-FA (PRENATAL VITAMINS PO) Take 1 tablet by mouth daily      sertraline (ZOLOFT) 25 mg tablet Take 1 tablet (25 mg total) by mouth daily 40 tablet 3       Allergies:  No Known Allergies    Review of Systems   Constitutional: Negative for fatigue and fever  Respiratory: Negative for cough, shortness of breath and wheezing  Gastrointestinal: Positive for abdominal pain  Negative for diarrhea, nausea and vomiting  Genitourinary: Negative for flank pain, genital sores, hematuria, vaginal bleeding and vaginal discharge  Musculoskeletal: Positive for back pain  Physical Exam   Constitutional: She is oriented to person, place, and time  She appears well-developed and well-nourished  HENT:   Head: Normocephalic and atraumatic  Nose: Nose normal    Eyes: Pupils are equal, round, and reactive to light  EOM are normal    Neck: Normal range of motion  No JVD present  Cardiovascular: Normal rate, regular rhythm and normal heart sounds  Exam reveals no gallop and no friction rub  No murmur heard  Pulmonary/Chest: Effort normal and breath sounds normal  No stridor  No respiratory distress  She has no wheezes  She has no rales  Abdominal: Soft  Bowel sounds are normal  She exhibits no distension  There is no tenderness  There is no rebound and no guarding  gravid   Genitourinary:   Genitourinary Comments: TVUS CL 2 35-2 40cm, cervix is posterior, firm, no blood or pooling on exam, neg KOH/[place]   Musculoskeletal: Normal range of motion  Neurological: She is alert and oriented to person, place, and time  Skin: Skin is warm and dry  No rash noted  No pallor  Psychiatric: She has a normal mood and affect  Her behavior is normal  Judgment and thought content normal    Vitals reviewed  Cervix:  Dilation: 1  Effacement (%): 80  Station: Ballotable  Cervical Characteristics: Posterior, Firm    Fetal heart rate:   Baseline Rate: 130 bpm  Variability: Moderate 6-25 bpm  Accelerations: 15 x 15 or greater, At variable times  Decelerations: None  FHR Category: Category I    Deepstep:   Contraction Frequency (minutes): n/a  Contraction Duration (seconds): n/a  Contraction Quality: Mild    EFW: 2 lb     GBS: pending    Membranes: intact    Labs:  Hemoglobin: 10 2  Blood type: O+  Antibody: negative  Group B strep: pending  HIV: negative  Hepatitis B: negative  RPR: non-reactive   Rubella: Immune  Varicella Unknown  1 hour Glucose: unknown    Assessment:   30 y o  [id number] at 28w2d admit for  labor    Plan:   1  IUP at 28w2d   - Continuous FHR and tocometry monitoring   - Vertex on US  2  Membrane status:    - Intact  3   labor assessment   - SSE: posterior, firm   - FFN: not performed as pt had gel during office check   - SVE: /-4   - CL 2 35-2 40cm on TVUS   - Infection workup    - GBS: pending, PCN on board per protocol    - Gonorrhea/chlamydia: in process    - Urine drug screen: negative    - Urine analysis/culture: in process  4   labor management   - BTM for FLM 12mg q24h for 2 doses   - Tocolytics for 48 hours;  Indomethacin PO 50mg loading, 25mg q8h   - Magnesium for neuroprotection (<32w) 6g loading, 2g maintenance dose   5  Routine antepartum labs   - CBC, RPR, T&S stat  6  NICU consult   - Prematurity  7   FEN   - Clear liquid diet, IV LR for hydration    D/w Dr Solis Luna DO, PGY-3  3/2/2020  9:32 PM

## 2020-03-03 NOTE — OB LABOR/OXYTOCIN SAFETY PROGRESS
Labor Progress Note - Tucker Ramirez 30 y o  female MRN: [id number]    Unit/Bed#: LD [facility] 3-01 Encounter: [id number]       Contraction Frequency (minutes): 0  Contraction Quality: Mild  Tachysystole: No   Dilation: 1        Effacement (%): 80  Station: Ballotable  Baseline Rate: 135 bpm  Fetal Heart Rate: 130 BPM  FHR Category: Category I             Notes/comments:     Pt checked due to increasing pelvic pressure, unchanged, abdomen remains soft  Continue current management, pt informed to let RN know if she feels more uncomfortable       Ronni Swift DO 3/3/2020 3:33 AM

## 2020-03-03 NOTE — UTILIZATION REVIEW
Initial Clinical Review    Admission: Date/Time/Statement: Admission Orders (From admission, onward)     Ordered        20  Inpatient Admission  Once                   Orders Placed This Encounter   Procedures    Inpatient Admission     Standing Status:   Standing     Number of Occurrences:   1     Order Specific Question:   Admitting Physician     Answer:   Vj Frazier [[id number]]     Order Specific Question:   Level of Care     Answer:   Med Surg [[id number]]     Order Specific Question:   Estimated length of stay     Answer:   More than 2 Midnights     Order Specific Question:   Certification     Answer:   I certify that inpatient services are medically necessary for this patient for a duration of greater than two midnights  See H&P and MD Progress Notes for additional information about the patient's course of treatment  ED Arrival Information     Expected Arrival Acuity Means of Arrival Escorted By Service Admission Type    3/2/2020  3/2/2020 18:59 - Walk-In Spouse OB/GYN Elective    Arrival Complaint    [address]        Chief Complaint   Patient presents with    Contractions     Assessment/Plan: 29 yo G 4 P 2 @ 28 3/7 wks presented to [facility] triage as inpatient admission of contractions  Patient was seen in OB office and 80% /-4   Hx of  delivery in the past  PMH sickle cell trait   OCervix:  Dilation: 1  Effacement (%): 80  Station: Ballotable  Cervical Characteristics: Posterior, Firm     Fetal heart rate:   Baseline Rate: 130 bpm  Variability: Moderate 6-25 bpm  Accelerations: 15 x 15 or greater, At variable times  Decelerations: None  FHR Category: Category I     [place]:   Contraction Frequency (minutes): n/a  Contraction Duration (seconds): n/a  Contraction Quality: Mildn exam  Plan continuous external monitoring BTM,MGSO4 infusion consult neonatology and perinatology     OB Triage Vitals   Temperature Pulse Respirations Blood Pressure SpO2   20 18420 18420 03/02/20 1849 03/03/20 0030   97 8 °F (36 6 °C) (!) 106 18 124/59 98 %      Temp Source Heart Rate Source Patient Position - Orthostatic VS BP Location FiO2 (%)   03/02/20 1849 03/02/20 1927 03/03/20 0800 03/03/20 0800 --   Oral Monitor Sitting Left arm       Pain Score       03/02/20 1927       8        Wt Readings from Last 1 Encounters:   03/02/20 70 3 kg (155 lb)     Additional Vital Signs:   Date/Time  Temp  Pulse  Resp  BP  SpO2  O2 Device  Patient Position - Orthostatic VS   03/03/20 1000  97 7 °F (36 5 °C)  114Abnormal   18  110/68  99 %  None (Room air)  Sitting   03/03/20 0800  97 8 °F (36 6 °C)  101  18  110/61  99 %  None (Room air)  Sitting   03/03/20 0616  97 7 °F (36 5 °C)  100  16  105/68  99 %  None (Room air)  --   03/03/20 0516  --  106Abnormal   --  100/59  99 %  None (Room air)  --   03/03/20 0326  98 2 °F (36 8 °C)  94  16  --  99 %  None (Room air)  --   Comment rows:   OBSERV: Pt transfered from triage to room 330 at 03/03/20 0326   03/03/20 0130  --  92  --  --  98 %  --  --   03/03/20 0030  --  99  --  --  98 %  None (Room air)  --   03/02/20 1933  --  --  --  116/67  --  --  --   Comment rows:   OBSERV: dr Eloy Schmid in room at 03/02/20 1933   03/02/20 1929  --  104  --  --  --  --  --   03/02/20 1927  98 3 °F (36 8 °C)  104  16  116/67  --  --           Pertinent Labs/Diagnostic Test Results:   Results from last 7 days   Lab Units 03/02/20 2107   WBC Thousand/uL 8 86   HEMOGLOBIN g/dL 10 2*   HEMATOCRIT % 30 6*   PLATELETS Thousands/uL 269   NEUTROS ABS Thousands/µL 5 87         Results from last 7 days   Lab Units 03/02/20  2107   SODIUM mmol/L 139   POTASSIUM mmol/L 3 5   CHLORIDE mmol/L 104   CO2 mmol/L 26   ANION GAP mmol/L 9   BUN mg/dL 4*   CREATININE mg/dL 0 60   EGFR ml/min/1 73sq m 145   CALCIUM mg/dL 8 5     Results from last 7 days   Lab Units 03/02/20  2107   AST U/L 14   ALT U/L 14   ALK PHOS U/L 104   TOTAL PROTEIN g/dL 7 2   ALBUMIN g/dL 2 7*   TOTAL BILIRUBIN mg/dL 0 35 Results from last 7 days   Lab Units 03/02/20  2107   GLUCOSE RANDOM mg/dL 84     Results from last 7 days   Lab Units 03/02/20  2108 03/02/20  1441   CLARITY UA  Clear  --    COLOR UA  Yellow  --    SPEC GRAV UA  <=1 005  --    PH UA  6 0  --    GLUCOSE UA mg/dl Negative negative   KETONES UA mg/dl Negative  --    BLOOD UA  Negative  --    PROTEIN UA mg/dl Negative negative   NITRITE UA  Negative  --    BILIRUBIN UA  Negative  --    UROBILINOGEN UA E U /dl 1 0  --    LEUKOCYTES UA  Small*  --    WBC UA /hpf 10-20*  --    RBC UA /hpf 1-2*  --    BACTERIA UA /hpf Occasional  --    EPITHELIAL CELLS WET PREP /hpf Occasional  --          Results from last 7 days   Lab Units 03/02/20 2108   AMPH/METH  Negative   BARBITURATE UR  Negative   BENZODIAZEPINE UR  Negative   COCAINE UR  Negative   METHADONE URINE  Negative   OPIATE UR  Negative   PCP UR  Negative   THC UR  Negative     OB TRIAGE Treatment:   Medication Administration from 03/02/2020 1846 to 03/03/2020 1056       Date/Time Order Dose Route Action     03/03/2020 0527 lactated ringers infusion 125 mL/hr Intravenous New Bag     03/02/2020 2045 lactated ringers infusion 125 mL/hr Intravenous New Bag     03/03/2020 0915 docusate sodium (COLACE) capsule 100 mg 100 mg Oral Given     03/02/2020 2228 docusate sodium (COLACE) capsule 100 mg 100 mg Oral Given     03/03/2020 0730 sod citrate-citric acid (BICITRA) oral solution 30 mL 30 mL Oral Refused     03/02/2020 2141 penicillin G (PFIZERPEN-G) in 0 9% sodium chloride 250 mL IVPB 5 Million Units 5 Million Units Intravenous New Bag     03/03/2020 1010 penicillin G (PFIZERPEN-G) in 0 9% sodium chloride 100 mL IVPB 2 5 Million Units 0 Million Units Intravenous Stopped     03/03/2020 0911 penicillin G (PFIZERPEN-G) in 0 9% sodium chloride 100 mL IVPB 2 5 Million Units 2 5 Million Units Intravenous New Bag     03/03/2020 0445 penicillin G (PFIZERPEN-G) in 0 9% sodium chloride 100 mL IVPB 2 5 Million Units 2 5 Million Units Intravenous New Bag     03/02/2020 2137 betamethasone acetate-betamethasone sodium phosphate (CELESTONE) injection 12 mg 12 mg Intramuscular Given     03/02/2020 2136 indomethacin (INDOCIN) capsule 50 mg 50 mg Oral Given     03/03/2020 0911 indomethacin (INDOCIN) capsule 25 mg 25 mg Oral Given     03/03/2020 0521 indomethacin (INDOCIN) capsule 25 mg 25 mg Oral Given     03/02/2020 2247 magnesium sulfate 4 g/100 mL IVPB (premix) 4 g 0 g Intravenous Stopped     03/02/2020 2204 magnesium sulfate 4 g/100 mL IVPB (premix) 4 g 4 g Intravenous New Bag     03/02/2020 2247 magnesium sulfate 2 g/50 mL IVPB (premix) 2 g 0 g Intravenous Stopped     03/02/2020 2228 magnesium sulfate 2 g/50 mL IVPB (premix) 2 g 2 g Intravenous New Bag     03/03/2020 1025 magnesium sulfate 20 g/500 mL infusion (premix) 0 g/hr Intravenous Stopped     03/03/2020 1000 magnesium sulfate 20 g/500 mL infusion (premix) 2 g/hr Intravenous Rate/Dose Verify     03/03/2020 0916 magnesium sulfate 20 g/500 mL infusion (premix) 2 g/hr Intravenous New Bag     03/02/2020 2258 magnesium sulfate 20 g/500 mL infusion (premix) 2 g/hr Intravenous Not Given     03/02/2020 2248 magnesium sulfate 20 g/500 mL infusion (premix) 2 g/hr Intravenous New Bag        Past Medical History:   Diagnosis Date    Abnormal Pap smear of cervix 2017    Anemia     Anxiety     Depression     History of blood transfusion 2016    x2 units    Miscarriage 2010    16wk    Mitral valve prolapse 2016    had echo in pregnancy; cleared for vag delivery per pt; pt states "still moderate"    Post partum depression 2016    she states she gave her mother custody of this son    Sickle cell trait ([address] )     Urinary tract infection      Present on Admission:  **None**      Admitting Diagnosis: 28 weeks gestation of pregnancy [Z3A 28]  Age/Sex: 30 y o  female  Admission Orders:  Scheduled Medications:    Medications:  betamethasone acetate-betamethasone sodium phosphate 12 mg Intramuscular Q24H   docusate sodium 100 mg Oral BID   indomethacin 25 mg Oral Q6H   prenatal multivitamin 1 tablet Oral Daily   sertraline 25 mg Oral Daily   sod citrate-citric acid 30 mL Oral 4x Daily (with meals and at bedtime)     Continuous IV Infusions:    lactated ringers 125 mL/hr Intravenous Continuous     PRN Meds:    acetaminophen 650 mg Oral Q4H PRN   calcium carbonate 1,000 mg Oral Daily PRN   ondansetron 4 mg Intravenous Q8H PRN   simethicone 80 mg Oral 4x Daily PRN       IP CONSULT TO ANESTHESIOLOGY  IP CONSULT TO NEONATOLOGY  IP CONSULT TO PERINATOLOGY   Continuous external monitoring  Assess lung sounds deep tendon reflexes and I/O 2 hrs while on MGSO 4 infusion  SCD      Network Utilization Review Department  [email or web address]  ATTENTION: Please call with any questions or concerns to [phone] and carefully listen to the prompts so that you are directed to the right person  All voicemails are confidential   Kymberly Black all requests for admission clinical reviews, approved or denied determinations and any other requests to dedicated fax number below belonging to the campus where the patient is receiving treatment   List of dedicated fax numbers for the Facilities:  [address] [place] [address] DENIALS (Administrative/Medical Necessity) [phone]   [address] [place] [address] (Maternity/NICU/Pediatrics) [phone]   Rachel Blank [phone]   Adele Perea [phone]   Julisa Fonseca [phone]   Zan Smith [phone]   [address] [facility] [address] [facility] [phone]   [facility]  [phone]   [address] [facility] [address]  [address] [facility] [address] [facility] [phone]

## 2020-03-03 NOTE — DISCHARGE INSTRUCTIONS
Labor   WHAT YOU NEED TO KNOW:    (premature) labor occurs when the uterus contracts and your cervix opens earlier than normal  The cervix is the opening of your uterus  In  labor, contractions are strong enough and occur often enough to allow the cervix to open for delivery of your baby   labor happens after the 20th week of pregnancy but before the 37th week of pregnancy  An early labor could cause you to have your baby before he is ready to be born  Signs and symptoms include abdominal pain, menstrual-like cramping, low back pain, and vaginal spotting or bleeding  DISCHARGE INSTRUCTIONS:   Call 911 for any of the following:   · You see or feel like there is something in your vagina  Seek care immediately if:   · You have bright red, painless vaginal bleeding  · Your symptoms do not get better or they get worse  · Your water broke or you feel warm water gushing or trickling from your vagina  · You have contractions that get stronger and closer together for more than 1 hour  Contact your healthcare provider if:   · You notice a decrease in your baby's movement  · You have abdominal cramps, pressure, or tightening  · You have a change in vaginal discharge  · You have a fever  · You have burning when you urinate or you are urinating less than is normal for you  · You have questions or concerns about your condition or care  Medicines:   · Antibiotics  may be given to treat a bacterial infection  · Take your medicine as directed  Contact your healthcare provider if you think your medicine is not helping or if you have side effects  Tell him or her if you are allergic to any medicine  Keep a list of the medicines, vitamins, and herbs you take  Include the amounts, and when and why you take them  Bring the list or the pill bottles to follow-up visits  Carry your medicine list with you in case of an emergency    Follow up with your healthcare provider or obstetrician as directed:  Write down your questions so you remember to ask them during your visits  Self-care:   · Rest  as much as possible  You may need to lie on your left side to improve circulation to the uterus and baby  You may be able to prevent  labor by resting and reducing your physical activity  · Ask your healthcare provider about activities that are safe for you to do  Your healthcare provider or obstetrician may recommend that you avoid sexual intercourse  Ask your healthcare provider if exercise is safe  · Drink liquids as directed  Ask how much liquid to drink each day and which liquids are best for you  · Do not smoke  Your baby may not grow well and he may weigh less at birth if you smoke during pregnancy  Smoking also increases the risk that your baby will be born too early  Nicotine and other chemicals in cigarettes and cigars can cause lung damage  Ask your healthcare provider for information if you currently smoke and need help to quit  E-cigarettes or smokeless tobacco still contain nicotine  Talk to your healthcare provider before you use these products  · Do not drink alcohol  Alcohol may harm your unborn baby and cause  labor  · Maintain a healthy weight  A healthy weight may prevent  labor  Ask your healthcare provider how much weight you should gain during your pregnancy  ©  2600 [employer] Information is for End User's use only and may not be sold, redistributed or otherwise used for commercial purposes  All illustrations and images included in CareNotes® are the copyrighted property of [employer]  or Colby Sweet  The above information is an  only  It is not intended as medical advice for individual conditions or treatments  Talk to your doctor, nurse or pharmacist before following any medical regimen to see if it is safe and effective for you
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
## 2020-03-03 NOTE — QUICK NOTE
Called by nursing staff  Pt reports she needs to leave; she has to  her ill son at school  Pt is aware that she is leaving against medical advice  She is aware that, by leaving, she is distancing herself from potentially life-saving interventions for both herself and her 28w unborn child  She demonstrated understanding  I also discussed that, should she start to experience any symptoms that may be related to  labor or ROM, she should return immediately to care  I also discussed that she has not yet completed her betamethasone course, and that it is recommended that she return at approximately 10PM to complete her course of steroids  Pt demonstrated understanding  I gave her the opportunity to ask questions, all of which were answered to her satisfaction  At the completion of our conversation, pt still desired to leave against medical advice  Pt signed [place] paperwork, witnessed by myself and her RN, Marie Pham, DO  OB/GYN, PGY4  3/3/2020, 12:45 PM

## 2020-03-04 ENCOUNTER — TELEPHONE (OUTPATIENT)
Dept: OBGYN CLINIC | Facility: CLINIC | Age: 25
End: 2020-03-04

## 2020-03-04 LAB
BACTERIA UR CULT: NORMAL
C TRACH DNA SPEC QL NAA+PROBE: NEGATIVE
GP B STREP DNA SPEC QL NAA+PROBE: ABNORMAL
N GONORRHOEA DNA SPEC QL NAA+PROBE: NEGATIVE

## 2020-03-04 NOTE — TELEPHONE ENCOUNTER
Patient left [place] 3/3 for hospitalization for observation and administration of steroids with concern for  dilation  She only received one of her steroid shots  Can we please call patient and see how she is feeling  If able would strongly recommend second injection ASAP

## 2020-03-04 NOTE — TELEPHONE ENCOUNTER
Pt willing to go for 2nd shot  Does she go to triage at [facility] for this? She is feeling fine today - said the magnesium made her sick to stomach but is fine now  To triage at [facility]?  Kathryn Kaminski  [birth year]  Per [facility], to [place]  I notified triage at [place]

## 2020-03-06 ENCOUNTER — TELEPHONE (OUTPATIENT)
Dept: OBGYN CLINIC | Facility: CLINIC | Age: 25
End: 2020-03-06

## 2020-03-06 NOTE — TELEPHONE ENCOUNTER
Patient called stated she has a yeast infection and was advised at her last visit to use Monistat  Patient says it caused her more irritation and would like a script for an alternative   Please advise

## 2020-03-06 NOTE — TELEPHONE ENCOUNTER
Dear Dr Burgos [place]:    OB Pt called office today c/o continued vaginitis symptoms despite finishing OTC Monistat 3 day treatment regimen  Pt's GONZALES is 20, GA 28 weeks + 6 days, high risk pregnancy due to history of  labor in second trimester, sickle cell trait ([address] ), polyhydramnios in second trimester  Pt saw you on 3/2/20 (Prenatal), scheduled to see you again on 3/17/20 (Prenatal)  Pt was advised to use miconazole cream for yeast per note in EHR dated 3/2/20 (Pt used OTC Monistat 3 day kit)  Pt reports that she used Monistat about 2 days ago, however, still experiencing symptoms  Pt states she has vaginal burning, vulvar itching & irritation, and faint fishy vaginal odor  Pt denies vaginal discharge, vaginal itching, vaginal foaminess, and urinary symptoms at this time  Pt states she was sexually active approximately 3 weeks ago, same partner, partner did not use condoms  Pt further states she has no known allergies to medications at this time  Please advise  Thank you!     Taty Hilton MA

## 2020-03-06 NOTE — TELEPHONE ENCOUNTER
Returned OB Pt's call today  Pt informed that her reported symptoms were addressed by Dr Maria Teresa Murray  Pt advised to schedule appointment for evaluation of symptoms as recommended by Dr Maria Teresa Murray  Pt scheduled for appointment on 3/9/20 with Jos Layne @ 10:40 am [facility] office), instructed to arrive by 10:25 am   Reiterated to Pt that if her symptoms worsen to call back office

## 2020-03-17 ENCOUNTER — ROUTINE PRENATAL (OUTPATIENT)
Dept: OBGYN CLINIC | Facility: CLINIC | Age: 25
End: 2020-03-17

## 2020-03-17 VITALS — WEIGHT: 153 LBS | DIASTOLIC BLOOD PRESSURE: 70 MMHG | BODY MASS INDEX: 25.46 KG/M2 | SYSTOLIC BLOOD PRESSURE: 128 MMHG

## 2020-03-17 DIAGNOSIS — O09.899 HISTORY OF PRETERM DELIVERY, CURRENTLY PREGNANT: ICD-10-CM

## 2020-03-17 DIAGNOSIS — D57.3 SICKLE CELL TRAIT (HCC): ICD-10-CM

## 2020-03-17 DIAGNOSIS — Z34.83 PRENATAL CARE, SUBSEQUENT PREGNANCY, THIRD TRIMESTER: Primary | ICD-10-CM

## 2020-03-17 DIAGNOSIS — O09.212 HIGH RISK PREGNANCY DUE TO HISTORY OF PRETERM LABOR IN SECOND TRIMESTER: ICD-10-CM

## 2020-03-17 DIAGNOSIS — O40.2XX0 POLYHYDRAMNIOS IN SECOND TRIMESTER, SINGLE OR UNSPECIFIED FETUS: ICD-10-CM

## 2020-03-17 LAB
SL AMB  POCT GLUCOSE, UA: NEGATIVE
SL AMB POCT URINE PROTEIN: NEGATIVE

## 2020-03-17 PROCEDURE — PNV: Performed by: STUDENT IN AN ORGANIZED HEALTH CARE EDUCATION/TRAINING PROGRAM

## 2020-03-17 NOTE — ASSESSMENT & PLAN NOTE
-declined nani, s/p CL with MFM  -admitted for  contractions 2 weeks ago, left AMA, feeling well now and does not want exam

## 2020-03-17 NOTE — PROGRESS NOTES
Good fetal movement  Patient did not go for 1 hour blood glucose test  Patient declined tdap and flu vaccine  No concerns or complaints at this time  Fetal kick count paper given

## 2020-03-17 NOTE — PROGRESS NOTES
44WD [id number] at 30wk3d, here for return OB visit  Feeling well overall and without concerns  Good FM  Denies LOF, VB, contractions  Has intermittent tightening, never painful, less strong than previously  Has not done glucola, plans to do tomorrow  FOB negative for Sickle Cell trait per pt and FOB, who is here today  No questions/concerns       Problem List Items Addressed This Visit        Other    History of  delivery, currently pregnant    High risk pregnancy due to history of  labor in second trimester     -declined nani, s/p CL with MFM  -admitted for  contractions 2 weeks ago, left AMA, feeling well now and does not want exam         Polyhydramnios in second trimester     Mild poly on 3/3         Sickle cell trait ([facility] [facility] )     FOB negative per report           Other Visit Diagnoses     Prenatal care, subsequent pregnancy, third trimester    -  Primary    Relevant Orders    POCT urine dip (Completed)

## 2020-03-29 ENCOUNTER — HOSPITAL ENCOUNTER (OUTPATIENT)
Facility: HOSPITAL | Age: 25
Discharge: HOME | End: 2020-03-29
Attending: OBSTETRICS & GYNECOLOGY | Admitting: OBSTETRICS & GYNECOLOGY
Payer: COMMERCIAL

## 2020-03-29 ENCOUNTER — TELEPHONE (OUTPATIENT)
Dept: OTHER | Facility: OTHER | Age: 25
End: 2020-03-29

## 2020-03-29 VITALS
WEIGHT: 155 LBS | OXYGEN SATURATION: 100 % | DIASTOLIC BLOOD PRESSURE: 66 MMHG | TEMPERATURE: 98.4 F | SYSTOLIC BLOOD PRESSURE: 121 MMHG | HEIGHT: 65 IN | HEART RATE: 104 BPM | RESPIRATION RATE: 16 BRPM | BODY MASS INDEX: 25.83 KG/M2

## 2020-03-29 PROBLEM — Z3A.32 32 WEEKS GESTATION OF PREGNANCY: Status: ACTIVE | Noted: 2020-03-03

## 2020-03-29 PROCEDURE — 76817 TRANSVAGINAL US OBSTETRIC: CPT | Performed by: OBSTETRICS & GYNECOLOGY

## 2020-03-29 PROCEDURE — 99214 OFFICE O/P EST MOD 30 MIN: CPT

## 2020-03-29 PROCEDURE — NC001 PR NO CHARGE: Performed by: OBSTETRICS & GYNECOLOGY

## 2020-03-29 RX ORDER — ACETAMINOPHEN 325 MG/1
650 TABLET ORAL EVERY 6 HOURS PRN
Status: DISCONTINUED | OUTPATIENT
Start: 2020-03-29 | End: 2020-03-29 | Stop reason: HOSPADM

## 2020-03-29 RX ADMIN — ACETAMINOPHEN 650 MG: 325 TABLET, FILM COATED ORAL at 19:23

## 2020-03-29 NOTE — TELEPHONE ENCOUNTER
MSG: PT 32 WKS PREGNANT- HAVING CONTRACTIONS EVERY 20 MIN- LASTING 15-30 SEC; PT EXPERIENCING HEADACHES AND BACK PAIN- WOULD LIKE TO CONSULT W/DR  TIGER TXT PT INFO TO  @ 0333

## 2020-05-02 ENCOUNTER — HOSPITAL ENCOUNTER (INPATIENT)
Facility: HOSPITAL | Age: 25
LOS: 2 days | Discharge: HOME | DRG: 560 | End: 2020-05-04
Attending: OBSTETRICS & GYNECOLOGY | Admitting: OBSTETRICS & GYNECOLOGY
Payer: COMMERCIAL

## 2020-05-02 ENCOUNTER — ANESTHESIA (INPATIENT)
Dept: ANESTHESIOLOGY | Facility: HOSPITAL | Age: 25
DRG: 560 | End: 2020-05-02
Payer: COMMERCIAL

## 2020-05-02 ENCOUNTER — ANESTHESIA EVENT (INPATIENT)
Dept: ANESTHESIOLOGY | Facility: HOSPITAL | Age: 25
DRG: 560 | End: 2020-05-02
Payer: COMMERCIAL

## 2020-05-02 DIAGNOSIS — Z3A.37 37 WEEKS GESTATION OF PREGNANCY: Primary | ICD-10-CM

## 2020-05-02 LAB
ABO GROUP BLD: NORMAL
BASE EXCESS BLDCOA CALC-SCNC: -2.7 MMOL/L (ref 3–11)
BASE EXCESS BLDCOV CALC-SCNC: -2.7 MMOL/L (ref 1–9)
BLD GP AB SCN SERPL QL: NEGATIVE
ERYTHROCYTE [DISTWIDTH] IN BLOOD BY AUTOMATED COUNT: 14.1 % (ref 11.6–15.1)
HCO3 BLDCOA-SCNC: 25.8 MMOL/L (ref 17.3–27.3)
HCO3 BLDCOV-SCNC: 23.3 MMOL/L (ref 12.2–28.6)
HCT VFR BLD AUTO: 30.2 % (ref 34.8–46.1)
HGB BLD-MCNC: 9.9 G/DL (ref 11.5–15.4)
MCH RBC QN AUTO: 25.9 PG (ref 26.8–34.3)
MCHC RBC AUTO-ENTMCNC: 32.8 G/DL (ref 31.4–37.4)
MCV RBC AUTO: 79 FL (ref 82–98)
O2 CT VFR BLDCOA CALC: 4.3 ML/DL
OXYHGB MFR BLDCOA: 20 %
OXYHGB MFR BLDCOV: 52.3 %
PCO2 BLDCOA: 59.9 MM[HG] (ref 30–60)
PCO2 BLDCOV: 45.2 MM HG (ref 27–43)
PH BLDCOA: 7.25 [PH] (ref 7.23–7.43)
PH BLDCOV: 7.33 [PH] (ref 7.19–7.49)
PLATELET # BLD AUTO: 265 THOUSANDS/UL (ref 149–390)
PMV BLD AUTO: 11.1 FL (ref 8.9–12.7)
PO2 BLDCOA: 12.9 MM HG (ref 5–25)
PO2 BLDCOV: 22 MM HG (ref 15–45)
RBC # BLD AUTO: 3.82 MILLION/UL (ref 3.81–5.12)
RH BLD: POSITIVE
SAO2 % BLDCOV: 11.2 ML/DL
SPECIMEN EXPIRATION DATE: NORMAL
WBC # BLD AUTO: 8.44 THOUSAND/UL (ref 4.31–10.16)

## 2020-05-02 PROCEDURE — NC001 PR NO CHARGE: Performed by: OBSTETRICS & GYNECOLOGY

## 2020-05-02 PROCEDURE — 59410 OBSTETRICAL CARE: CPT | Performed by: OBSTETRICS & GYNECOLOGY

## 2020-05-02 PROCEDURE — 99214 OFFICE O/P EST MOD 30 MIN: CPT

## 2020-05-02 PROCEDURE — 86592 SYPHILIS TEST NON-TREP QUAL: CPT | Performed by: OBSTETRICS & GYNECOLOGY

## 2020-05-02 PROCEDURE — 86850 RBC ANTIBODY SCREEN: CPT | Performed by: OBSTETRICS & GYNECOLOGY

## 2020-05-02 PROCEDURE — 10907ZC DRAINAGE OF AMNIOTIC FLUID, THERAPEUTIC FROM PRODUCTS OF CONCEPTION, VIA NATURAL OR ARTIFICIAL OPENING: ICD-10-PCS | Performed by: OBSTETRICS & GYNECOLOGY

## 2020-05-02 PROCEDURE — 86901 BLOOD TYPING SEROLOGIC RH(D): CPT | Performed by: OBSTETRICS & GYNECOLOGY

## 2020-05-02 PROCEDURE — 82805 BLOOD GASES W/O2 SATURATION: CPT | Performed by: OBSTETRICS & GYNECOLOGY

## 2020-05-02 PROCEDURE — 4A1HXCZ MONITORING OF PRODUCTS OF CONCEPTION, CARDIAC RATE, EXTERNAL APPROACH: ICD-10-PCS | Performed by: OBSTETRICS & GYNECOLOGY

## 2020-05-02 PROCEDURE — 86900 BLOOD TYPING SEROLOGIC ABO: CPT | Performed by: OBSTETRICS & GYNECOLOGY

## 2020-05-02 PROCEDURE — 85027 COMPLETE CBC AUTOMATED: CPT | Performed by: OBSTETRICS & GYNECOLOGY

## 2020-05-02 RX ORDER — SODIUM CHLORIDE, SODIUM LACTATE, POTASSIUM CHLORIDE, CALCIUM CHLORIDE 600; 310; 30; 20 MG/100ML; MG/100ML; MG/100ML; MG/100ML
125 INJECTION, SOLUTION INTRAVENOUS CONTINUOUS
Status: DISCONTINUED | OUTPATIENT
Start: 2020-05-02 | End: 2020-05-03

## 2020-05-02 RX ORDER — LABETALOL 20 MG/4 ML (5 MG/ML) INTRAVENOUS SYRINGE
Status: DISCONTINUED
Start: 2020-05-02 | End: 2020-05-03 | Stop reason: WASHOUT

## 2020-05-02 RX ORDER — OXYTOCIN/RINGER'S LACTATE 30/500 ML
1-30 PLASTIC BAG, INJECTION (ML) INTRAVENOUS
Status: DISCONTINUED | OUTPATIENT
Start: 2020-05-02 | End: 2020-05-03

## 2020-05-02 RX ORDER — ROPIVACAINE HYDROCHLORIDE 2 MG/ML
INJECTION, SOLUTION EPIDURAL; INFILTRATION; PERINEURAL AS NEEDED
Status: DISCONTINUED | OUTPATIENT
Start: 2020-05-02 | End: 2020-05-02 | Stop reason: SURG

## 2020-05-02 RX ORDER — ONDANSETRON 2 MG/ML
4 INJECTION INTRAMUSCULAR; INTRAVENOUS EVERY 6 HOURS PRN
Status: DISCONTINUED | OUTPATIENT
Start: 2020-05-02 | End: 2020-05-03

## 2020-05-02 RX ORDER — LIDOCAINE HYDROCHLORIDE AND EPINEPHRINE 15; 5 MG/ML; UG/ML
INJECTION, SOLUTION EPIDURAL
Status: COMPLETED | OUTPATIENT
Start: 2020-05-02 | End: 2020-05-02

## 2020-05-02 RX ORDER — SERTRALINE HYDROCHLORIDE 25 MG/1
25 TABLET, FILM COATED ORAL DAILY
Status: DISCONTINUED | OUTPATIENT
Start: 2020-05-02 | End: 2020-05-04 | Stop reason: HOSPADM

## 2020-05-02 RX ADMIN — LIDOCAINE HYDROCHLORIDE AND EPINEPHRINE 5 ML: 15; 5 INJECTION, SOLUTION EPIDURAL at 22:10

## 2020-05-02 RX ADMIN — ROPIVACAINE HYDROCHLORIDE 5 ML: 2 INJECTION, SOLUTION EPIDURAL; INFILTRATION at 22:15

## 2020-05-02 RX ADMIN — SODIUM CHLORIDE, SODIUM LACTATE, POTASSIUM CHLORIDE, AND CALCIUM CHLORIDE 125 ML/HR: .6; .31; .03; .02 INJECTION, SOLUTION INTRAVENOUS at 17:42

## 2020-05-02 RX ADMIN — SODIUM CHLORIDE 5 MILLION UNITS: 0.9 INJECTION, SOLUTION INTRAVENOUS at 09:30

## 2020-05-02 RX ADMIN — SODIUM CHLORIDE, SODIUM LACTATE, POTASSIUM CHLORIDE, AND CALCIUM CHLORIDE 999 ML/HR: .6; .31; .03; .02 INJECTION, SOLUTION INTRAVENOUS at 09:20

## 2020-05-02 RX ADMIN — ROPIVACAINE HYDROCHLORIDE: 2 INJECTION, SOLUTION EPIDURAL; INFILTRATION at 22:15

## 2020-05-02 RX ADMIN — SODIUM CHLORIDE 2.5 MILLION UNITS: 9 INJECTION, SOLUTION INTRAVENOUS at 17:42

## 2020-05-02 RX ADMIN — Medication 2 MILLI-UNITS/MIN: at 22:25

## 2020-05-02 RX ADMIN — SODIUM CHLORIDE 2.5 MILLION UNITS: 9 INJECTION, SOLUTION INTRAVENOUS at 13:25

## 2020-05-02 RX ADMIN — SODIUM CHLORIDE 2.5 MILLION UNITS: 9 INJECTION, SOLUTION INTRAVENOUS at 21:33

## 2020-05-03 PROCEDURE — 99024 POSTOP FOLLOW-UP VISIT: CPT | Performed by: OBSTETRICS & GYNECOLOGY

## 2020-05-03 RX ORDER — ONDANSETRON 2 MG/ML
4 INJECTION INTRAMUSCULAR; INTRAVENOUS EVERY 8 HOURS PRN
Status: DISCONTINUED | OUTPATIENT
Start: 2020-05-03 | End: 2020-05-04 | Stop reason: HOSPADM

## 2020-05-03 RX ORDER — ACETAMINOPHEN 325 MG/1
650 TABLET ORAL EVERY 4 HOURS PRN
Status: DISCONTINUED | OUTPATIENT
Start: 2020-05-03 | End: 2020-05-04 | Stop reason: HOSPADM

## 2020-05-03 RX ORDER — CALCIUM CARBONATE 200(500)MG
1000 TABLET,CHEWABLE ORAL DAILY PRN
Status: DISCONTINUED | OUTPATIENT
Start: 2020-05-03 | End: 2020-05-04 | Stop reason: HOSPADM

## 2020-05-03 RX ORDER — OXYTOCIN/RINGER'S LACTATE 30/500 ML
250 PLASTIC BAG, INJECTION (ML) INTRAVENOUS
Status: DISCONTINUED | OUTPATIENT
Start: 2020-05-03 | End: 2020-05-04 | Stop reason: HOSPADM

## 2020-05-03 RX ORDER — DIPHENHYDRAMINE HCL 25 MG
25 TABLET ORAL EVERY 6 HOURS PRN
Status: DISCONTINUED | OUTPATIENT
Start: 2020-05-03 | End: 2020-05-04 | Stop reason: HOSPADM

## 2020-05-03 RX ORDER — DOCUSATE SODIUM 100 MG/1
100 CAPSULE, LIQUID FILLED ORAL 2 TIMES DAILY
Status: DISCONTINUED | OUTPATIENT
Start: 2020-05-03 | End: 2020-05-04 | Stop reason: HOSPADM

## 2020-05-03 RX ORDER — DIAPER,BRIEF,INFANT-TODD,DISP
1 EACH MISCELLANEOUS 4 TIMES DAILY PRN
Status: DISCONTINUED | OUTPATIENT
Start: 2020-05-03 | End: 2020-05-04 | Stop reason: HOSPADM

## 2020-05-03 RX ORDER — IBUPROFEN 600 MG/1
600 TABLET ORAL EVERY 6 HOURS PRN
Status: DISCONTINUED | OUTPATIENT
Start: 2020-05-03 | End: 2020-05-04 | Stop reason: HOSPADM

## 2020-05-03 RX ADMIN — IBUPROFEN 600 MG: 600 TABLET ORAL at 01:47

## 2020-05-03 RX ADMIN — ACETAMINOPHEN 650 MG: 325 TABLET, FILM COATED ORAL at 19:53

## 2020-05-03 RX ADMIN — DOCUSATE SODIUM 100 MG: 100 CAPSULE, LIQUID FILLED ORAL at 08:24

## 2020-05-03 RX ADMIN — ACETAMINOPHEN 650 MG: 325 TABLET, FILM COATED ORAL at 12:34

## 2020-05-03 RX ADMIN — IBUPROFEN 600 MG: 600 TABLET ORAL at 08:25

## 2020-05-04 VITALS
WEIGHT: 154 LBS | HEART RATE: 84 BPM | HEIGHT: 65 IN | DIASTOLIC BLOOD PRESSURE: 76 MMHG | SYSTOLIC BLOOD PRESSURE: 121 MMHG | OXYGEN SATURATION: 100 % | RESPIRATION RATE: 20 BRPM | BODY MASS INDEX: 25.66 KG/M2 | TEMPERATURE: 98.6 F

## 2020-05-04 LAB — RPR SER QL: NORMAL

## 2020-05-04 PROCEDURE — 99024 POSTOP FOLLOW-UP VISIT: CPT | Performed by: OBSTETRICS & GYNECOLOGY

## 2020-05-04 RX ORDER — IBUPROFEN 600 MG/1
600 TABLET ORAL EVERY 6 HOURS PRN
Qty: 30 TABLET | Refills: 0
Start: 2020-05-04 | End: 2020-07-02

## 2020-05-04 RX ORDER — DOCUSATE SODIUM 100 MG/1
100 CAPSULE, LIQUID FILLED ORAL 2 TIMES DAILY
Qty: 10 CAPSULE | Refills: 0
Start: 2020-05-04 | End: 2020-07-02

## 2020-05-04 RX ADMIN — IBUPROFEN 600 MG: 600 TABLET ORAL at 11:06

## 2020-05-11 LAB — PLACENTA IN STORAGE: NORMAL

## 2020-05-21 ENCOUNTER — TELEPHONE (OUTPATIENT)
Dept: OBGYN CLINIC | Facility: CLINIC | Age: 25
End: 2020-05-21

## 2020-07-02 ENCOUNTER — CLINICAL SUPPORT (OUTPATIENT)
Dept: OBGYN CLINIC | Age: 25
End: 2020-07-02
Payer: COMMERCIAL

## 2020-07-02 VITALS — SYSTOLIC BLOOD PRESSURE: 100 MMHG | DIASTOLIC BLOOD PRESSURE: 64 MMHG

## 2020-07-02 DIAGNOSIS — Z30.013 INITIATION OF DEPO PROVERA: ICD-10-CM

## 2020-07-02 DIAGNOSIS — Z32.02 URINE PREGNANCY TEST NEGATIVE: Primary | ICD-10-CM

## 2020-07-02 DIAGNOSIS — Z30.013 ENCOUNTER FOR INITIAL PRESCRIPTION OF INJECTABLE CONTRACEPTIVE: Primary | ICD-10-CM

## 2020-07-02 LAB — SL AMB POCT URINE HCG: NORMAL

## 2020-07-02 PROCEDURE — 81025 URINE PREGNANCY TEST: CPT | Performed by: NURSE PRACTITIONER

## 2020-07-02 PROCEDURE — 96372 THER/PROPH/DIAG INJ SC/IM: CPT | Performed by: NURSE PRACTITIONER

## 2020-07-02 RX ORDER — MEDROXYPROGESTERONE ACETATE 150 MG/ML
150 INJECTION, SUSPENSION INTRAMUSCULAR
Qty: 1 ML | Refills: 0 | Status: SHIPPED | OUTPATIENT
Start: 2020-07-02 | End: 2020-09-24 | Stop reason: SDUPTHER

## 2020-07-02 RX ORDER — MEDROXYPROGESTERONE ACETATE 150 MG/ML
150 INJECTION, SUSPENSION INTRAMUSCULAR ONCE
Status: COMPLETED | OUTPATIENT
Start: 2020-07-02 | End: 2020-07-02

## 2020-07-02 RX ADMIN — MEDROXYPROGESTERONE ACETATE 150 MG: 150 INJECTION, SUSPENSION INTRAMUSCULAR at 15:19

## 2020-07-02 NOTE — PROGRESS NOTES
Pt is here for Depo Provera injection  Her first dose is today  Her annual exam is scheduled  Urine pregnancy test done: Y   Result: Neg  Depo given in L Buttock  Tolerated well  Lot [id number] Exp 02/2022  Next dose due 09/17-10/1

## 2020-08-13 ENCOUNTER — OFFICE VISIT (OUTPATIENT)
Dept: INTERNAL MEDICINE CLINIC | Facility: CLINIC | Age: 25
End: 2020-08-13
Payer: COMMERCIAL

## 2020-08-13 VITALS
HEART RATE: 78 BPM | BODY MASS INDEX: 25.13 KG/M2 | RESPIRATION RATE: 12 BRPM | DIASTOLIC BLOOD PRESSURE: 72 MMHG | HEIGHT: 66 IN | WEIGHT: 156.4 LBS | TEMPERATURE: 97.8 F | SYSTOLIC BLOOD PRESSURE: 108 MMHG

## 2020-08-13 DIAGNOSIS — D50.9 IRON DEFICIENCY ANEMIA, UNSPECIFIED IRON DEFICIENCY ANEMIA TYPE: ICD-10-CM

## 2020-08-13 DIAGNOSIS — Z13.6 SCREENING FOR CARDIOVASCULAR CONDITION: ICD-10-CM

## 2020-08-13 DIAGNOSIS — Z00.00 ADULT GENERAL MEDICAL EXAMINATION: Primary | ICD-10-CM

## 2020-08-13 PROBLEM — O40.2XX0 POLYHYDRAMNIOS IN SECOND TRIMESTER: Status: RESOLVED | Noted: 2020-01-09 | Resolved: 2020-08-13

## 2020-08-13 PROBLEM — O09.899 HISTORY OF PRETERM DELIVERY, CURRENTLY PREGNANT: Status: RESOLVED | Noted: 2019-11-11 | Resolved: 2020-08-13

## 2020-08-13 PROBLEM — O23.42 URINARY TRACT INFECTION IN MOTHER DURING SECOND TRIMESTER OF PREGNANCY: Status: RESOLVED | Noted: 2019-12-09 | Resolved: 2020-08-13

## 2020-08-13 PROBLEM — T59.811A SMOKE INHALATION: Status: RESOLVED | Noted: 2019-01-05 | Resolved: 2020-08-13

## 2020-08-13 PROBLEM — O09.212 HIGH RISK PREGNANCY DUE TO HISTORY OF PRETERM LABOR IN SECOND TRIMESTER: Status: RESOLVED | Noted: 2019-12-09 | Resolved: 2020-08-13

## 2020-08-13 PROBLEM — O09.299 PREGNANCY WITH POOR OBSTETRIC HISTORY: Status: RESOLVED | Noted: 2019-11-19 | Resolved: 2020-08-13

## 2020-08-13 PROBLEM — E63.9 INADEQUATE DIETARY INTAKE: Status: RESOLVED | Noted: 2019-11-11 | Resolved: 2020-08-13

## 2020-08-13 PROBLEM — D57.3 SICKLE CELL TRAIT (HCC): Chronic | Status: ACTIVE | Noted: 2020-03-02

## 2020-08-13 PROBLEM — Z34.80 SUPERVISION OF OTHER NORMAL PREGNANCY, ANTEPARTUM: Status: RESOLVED | Noted: 2019-11-11 | Resolved: 2020-08-13

## 2020-08-13 PROCEDURE — 3008F BODY MASS INDEX DOCD: CPT | Performed by: INTERNAL MEDICINE

## 2020-08-13 PROCEDURE — 3725F SCREEN DEPRESSION PERFORMED: CPT | Performed by: INTERNAL MEDICINE

## 2020-08-13 PROCEDURE — 99385 PREV VISIT NEW AGE 18-39: CPT | Performed by: INTERNAL MEDICINE

## 2020-08-13 PROCEDURE — 1036F TOBACCO NON-USER: CPT | Performed by: INTERNAL MEDICINE
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
## 2020-08-13 NOTE — PATIENT INSTRUCTIONS

## 2020-08-18 ENCOUNTER — APPOINTMENT (OUTPATIENT)
Dept: LAB | Facility: CLINIC | Age: 25
End: 2020-08-18
Payer: COMMERCIAL

## 2020-08-18 DIAGNOSIS — D50.9 IRON DEFICIENCY ANEMIA, UNSPECIFIED IRON DEFICIENCY ANEMIA TYPE: ICD-10-CM

## 2020-08-18 DIAGNOSIS — Z13.6 SCREENING FOR CARDIOVASCULAR CONDITION: ICD-10-CM

## 2020-08-18 DIAGNOSIS — Z00.00 ADULT GENERAL MEDICAL EXAMINATION: ICD-10-CM

## 2020-08-18 LAB
ALBUMIN SERPL BCP-MCNC: 3.7 G/DL (ref 3.5–5)
ALP SERPL-CCNC: 60 U/L (ref 46–116)
ALT SERPL W P-5'-P-CCNC: 15 U/L (ref 12–78)
ANION GAP SERPL CALCULATED.3IONS-SCNC: 7 MMOL/L (ref 4–13)
AST SERPL W P-5'-P-CCNC: 11 U/L (ref 5–45)
BILIRUB SERPL-MCNC: 0.48 MG/DL (ref 0.2–1)
BUN SERPL-MCNC: 8 MG/DL (ref 5–25)
CALCIUM SERPL-MCNC: 8.8 MG/DL (ref 8.3–10.1)
CHLORIDE SERPL-SCNC: 111 MMOL/L (ref 100–108)
CHOLEST SERPL-MCNC: 164 MG/DL (ref 50–200)
CO2 SERPL-SCNC: 25 MMOL/L (ref 21–32)
CREAT SERPL-MCNC: 0.85 MG/DL (ref 0.6–1.3)
ERYTHROCYTE [DISTWIDTH] IN BLOOD BY AUTOMATED COUNT: 14.3 % (ref 11.6–15.1)
FERRITIN SERPL-MCNC: 5 NG/ML (ref 8–388)
GFR SERPL CREATININE-BSD FRML MDRD: 109 ML/MIN/1.73SQ M
GLUCOSE SERPL-MCNC: 78 MG/DL (ref 65–140)
HCT VFR BLD AUTO: 34.2 % (ref 34.8–46.1)
HDLC SERPL-MCNC: 45 MG/DL
HGB BLD-MCNC: 11 G/DL (ref 11.5–15.4)
IRON SATN MFR SERPL: 7 %
IRON SERPL-MCNC: 28 UG/DL (ref 50–170)
LDLC SERPL CALC-MCNC: 103 MG/DL (ref 0–100)
MCH RBC QN AUTO: 25.5 PG (ref 26.8–34.3)
MCHC RBC AUTO-ENTMCNC: 32.2 G/DL (ref 31.4–37.4)
MCV RBC AUTO: 79 FL (ref 82–98)
NONHDLC SERPL-MCNC: 119 MG/DL
PLATELET # BLD AUTO: 314 THOUSANDS/UL (ref 149–390)
PMV BLD AUTO: 11.2 FL (ref 8.9–12.7)
POTASSIUM SERPL-SCNC: 3.7 MMOL/L (ref 3.5–5.3)
PROT SERPL-MCNC: 7.8 G/DL (ref 6.4–8.2)
RBC # BLD AUTO: 4.31 MILLION/UL (ref 3.81–5.12)
SODIUM SERPL-SCNC: 143 MMOL/L (ref 136–145)
TIBC SERPL-MCNC: 415 UG/DL (ref 250–450)
TRIGL SERPL-MCNC: 82 MG/DL
WBC # BLD AUTO: 3.85 THOUSAND/UL (ref 4.31–10.16)

## 2020-08-18 PROCEDURE — 80053 COMPREHEN METABOLIC PANEL: CPT

## 2020-08-18 PROCEDURE — 80061 LIPID PANEL: CPT

## 2020-08-18 PROCEDURE — 83550 IRON BINDING TEST: CPT

## 2020-08-18 PROCEDURE — 85027 COMPLETE CBC AUTOMATED: CPT

## 2020-08-18 PROCEDURE — 82728 ASSAY OF FERRITIN: CPT

## 2020-08-18 PROCEDURE — 36415 COLL VENOUS BLD VENIPUNCTURE: CPT

## 2020-08-18 PROCEDURE — 83540 ASSAY OF IRON: CPT

## 2020-08-19 ENCOUNTER — TELEPHONE (OUTPATIENT)
Dept: INTERNAL MEDICINE CLINIC | Facility: CLINIC | Age: 25
End: 2020-08-19

## 2020-08-19 NOTE — TELEPHONE ENCOUNTER
----- Message from Joce Domínguez DO sent at 8/19/2020  7:33 AM EDT -----  Call patient and let her know labs do show evidence of iron deficiency anemia  A little better then it has been in the past, but I would recommend she start iron supplementation 2-3 times per day with meals   Remainder of her labs were normal

## 2020-09-24 ENCOUNTER — CLINICAL SUPPORT (OUTPATIENT)
Dept: OBGYN CLINIC | Age: 25
End: 2020-09-24
Payer: COMMERCIAL

## 2020-09-24 VITALS
BODY MASS INDEX: 27.2 KG/M2 | DIASTOLIC BLOOD PRESSURE: 74 MMHG | WEIGHT: 166 LBS | TEMPERATURE: 98.2 F | SYSTOLIC BLOOD PRESSURE: 128 MMHG

## 2020-09-24 DIAGNOSIS — Z30.42 SURVEILLANCE FOR DEPO-PROVERA CONTRACEPTION: Primary | ICD-10-CM

## 2020-09-24 DIAGNOSIS — Z30.013 ENCOUNTER FOR INITIAL PRESCRIPTION OF INJECTABLE CONTRACEPTIVE: ICD-10-CM

## 2020-09-24 PROCEDURE — 96372 THER/PROPH/DIAG INJ SC/IM: CPT | Performed by: NURSE PRACTITIONER

## 2020-09-24 RX ORDER — MEDROXYPROGESTERONE ACETATE 150 MG/ML
150 INJECTION, SUSPENSION INTRAMUSCULAR
Qty: 1 ML | Refills: 0 | Status: SHIPPED | OUTPATIENT
Start: 2020-09-24 | End: 2021-01-05 | Stop reason: SDUPTHER

## 2020-09-24 RX ORDER — MEDROXYPROGESTERONE ACETATE 150 MG/ML
150 INJECTION, SUSPENSION INTRAMUSCULAR
Status: DISCONTINUED | OUTPATIENT
Start: 2020-09-24 | End: 2021-04-26

## 2020-09-24 RX ADMIN — MEDROXYPROGESTERONE ACETATE 150 MG: 150 INJECTION, SUSPENSION INTRAMUSCULAR at 15:38

## 2020-09-24 NOTE — PROGRESS NOTES
Pt is here for Depo Provera injection  Her last dose was 7/2/20  Her annual exam was rescheduled by patient  Urine pregnancy test done: No  Depo given in right buttock  Tolerated well  Lot [id number] Exp 09/2022  Next dose due 12/10-12/24

## 2020-09-24 NOTE — PATIENT INSTRUCTIONS
Medroxyprogesterone (By injection)   Medroxyprogesterone (ed-ovho-ow-proe-SERGE-ter-one)  Prevents pregnancy  Also treats endometriosis and is used with other medicines to help relieve symptoms of cancer, including uterine or kidney cancer  Brand Name(s): Depo-Provera, Depo-Provera Contraceptive, Depo-SubQ Provera 104   There may be other brand names for this medicine  When This Medicine Should Not Be Used: This medicine is not right for everyone  You should not receive it if you had an allergic reaction to medroxyprogesterone or if you have a history of breast cancer or blood clots (including heart attack or stroke)  In most cases, you should not use this medicine while you are pregnant  How to Use This Medicine:   Injectable  · A nurse or other health provider will give you this medicine  This medicine is given as a shot into a muscle or just under the skin  · Your exact treatment schedule depends on the reason you are using this medicine  You doctor will explain your personal schedule  ¨ For treatment of cancer symptoms, you may start with a shot once per week  You may need fewer shots as your treatment goes forward  ¨ For birth control or endometriosis, you will need a shot every 3 months (13 weeks)  Read and follow the patient instructions that come with this medicine  Talk to your doctor or pharmacist if you have any questions  ¨ You might need to have the first shot during the first 5 days of your normal menstrual period, to make sure you are not pregnant  If you have just had a baby, you may receive a shot 5 days after birth if you are not breastfeeding or 6 weeks after birth if you are breastfeeding  · Missed dose: You must receive a shot every 3 months if you want to prevent pregnancy  Talk to your doctor or pharmacist if you do not receive your medicine on time, because you may need another form of birth control    Drugs and Foods to Avoid:   Ask your doctor or pharmacist before using any other medicine, including over-the-counter medicines, vitamins, and herbal products  · Some foods and medicines can affect how medroxyprogesterone works  Tell your doctor if you are using any of the following:  ¨ Aminoglutethimide, bosentan, carbamazepine, felbamate, griseofulvin, nefazodone, oxcarbazepine, phenobarbital, phenytoin, rifabutin, rifampin, rifapentine, Johnathan's wort, topiramate  ¨ Medicine to treat an infection (including clarithromycin, itraconazole, ketoconazole, telithromycin, voriconazole)  ¨ Medicine to treat HIV/AIDS (including atazanavir, indinavir, nelfinavir, ritonavir, saquinavir)  Warnings While Using This Medicine:   · Tell your doctor right away if you think you have become pregnant  · Tell your doctor if you are breastfeeding or if you have liver disease, kidney disease, asthma, diabetes, heart disease, seizures, migraine headaches, an eating disorder, osteoporosis, or a history of depression  Tell your doctor if you smoke  · This medicine may cause the following problems:  ¨ Blood clots, which could lead to stroke, heart attack, or other serious problems  ¨ Possible increased risk of breast cancer  ¨ Weak or thin bones, especially with long-term use  · You should not use this medicine for long-term birth control unless you cannot use any other form of birth control  · This medicine will not protect you from HIV/AIDS or other sexually transmitted diseases  · Tell any doctor or dentist who treats you that you are using this medicine  This medicine may affect certain medical test results  · Your doctor will check your progress and the effects of this medicine at regular visits  Keep all appointments    Possible Side Effects While Using This Medicine:   Call your doctor right away if you notice any of these side effects:  · Allergic reaction: Itching or hives, swelling in your face or hands, swelling or tingling in your mouth or throat, chest tightness, trouble breathing  · Chest pain, trouble breathing, or coughing up blood  · Dark urine or pale stools, nausea, vomiting, loss of appetite, stomach pain, yellow skin or eyes  · Heavy or nonstop vaginal bleeding  · Loss of vision, double vision  · Numbness or weakness on one side of your body, sudden or severe headache, problems with vision, speech, or walking  · Severe stomach pain or cramps  If you notice these less serious side effects, talk with your doctor:   · Headache  · Light or missed monthly periods, spotting between periods  · Nervousness or dizziness  · Pain, redness, burning, swelling, or a lump under your skin where the shot was given  · Weight gain  If you notice other side effects that you think are caused by this medicine, tell your doctor  Call your doctor for medical advice about side effects  You may report side effects to FDA at 7-610-FDA-6909  © 2017 [address] Information is for End User's use only and may not be sold, redistributed or otherwise used for commercial purposes  The above information is an  only  It is not intended as medical advice for individual conditions or treatments  Talk to your doctor, nurse or pharmacist before following any medical regimen to see if it is safe and effective for you

## 2020-10-28 ENCOUNTER — CONSULT (OUTPATIENT)
Dept: INTERNAL MEDICINE CLINIC | Facility: CLINIC | Age: 25
End: 2020-10-28
Payer: COMMERCIAL

## 2020-10-28 ENCOUNTER — APPOINTMENT (OUTPATIENT)
Dept: LAB | Facility: CLINIC | Age: 25
End: 2020-10-28
Payer: COMMERCIAL

## 2020-10-28 VITALS
HEIGHT: 66 IN | SYSTOLIC BLOOD PRESSURE: 118 MMHG | BODY MASS INDEX: 27.48 KG/M2 | WEIGHT: 171 LBS | OXYGEN SATURATION: 98 % | DIASTOLIC BLOOD PRESSURE: 80 MMHG | TEMPERATURE: 97.4 F | HEART RATE: 96 BPM

## 2020-10-28 DIAGNOSIS — Z01.818 PREOPERATIVE CLEARANCE: Primary | ICD-10-CM

## 2020-10-28 DIAGNOSIS — Z01.818 PREOP TESTING: ICD-10-CM

## 2020-10-28 LAB
BASOPHILS # BLD AUTO: 0.06 THOUSANDS/ΜL (ref 0–0.1)
BASOPHILS NFR BLD AUTO: 1 % (ref 0–1)
EOSINOPHIL # BLD AUTO: 0.09 THOUSAND/ΜL (ref 0–0.61)
EOSINOPHIL NFR BLD AUTO: 2 % (ref 0–6)
ERYTHROCYTE [DISTWIDTH] IN BLOOD BY AUTOMATED COUNT: 17.8 % (ref 11.6–15.1)
HCT VFR BLD AUTO: 36.4 % (ref 34.8–46.1)
HGB BLD-MCNC: 12.1 G/DL (ref 11.5–15.4)
IMM GRANULOCYTES # BLD AUTO: 0.01 THOUSAND/UL (ref 0–0.2)
IMM GRANULOCYTES NFR BLD AUTO: 0 % (ref 0–2)
LYMPHOCYTES # BLD AUTO: 2.07 THOUSANDS/ΜL (ref 0.6–4.47)
LYMPHOCYTES NFR BLD AUTO: 37 % (ref 14–44)
MCH RBC QN AUTO: 26.8 PG (ref 26.8–34.3)
MCHC RBC AUTO-ENTMCNC: 33.2 G/DL (ref 31.4–37.4)
MCV RBC AUTO: 81 FL (ref 82–98)
MONOCYTES # BLD AUTO: 0.37 THOUSAND/ΜL (ref 0.17–1.22)
MONOCYTES NFR BLD AUTO: 7 % (ref 4–12)
NEUTROPHILS # BLD AUTO: 3.02 THOUSANDS/ΜL (ref 1.85–7.62)
NEUTS SEG NFR BLD AUTO: 53 % (ref 43–75)
NRBC BLD AUTO-RTO: 0 /100 WBCS
PLATELET # BLD AUTO: 335 THOUSANDS/UL (ref 149–390)
PMV BLD AUTO: 11.1 FL (ref 8.9–12.7)
RBC # BLD AUTO: 4.51 MILLION/UL (ref 3.81–5.12)
WBC # BLD AUTO: 5.62 THOUSAND/UL (ref 4.31–10.16)

## 2020-10-28 PROCEDURE — 99214 OFFICE O/P EST MOD 30 MIN: CPT | Performed by: NURSE PRACTITIONER

## 2020-10-28 PROCEDURE — 36415 COLL VENOUS BLD VENIPUNCTURE: CPT

## 2020-10-28 PROCEDURE — 3008F BODY MASS INDEX DOCD: CPT | Performed by: NURSE PRACTITIONER

## 2020-10-28 PROCEDURE — 85025 COMPLETE CBC W/AUTO DIFF WBC: CPT

## 2020-10-29 ENCOUNTER — TELEPHONE (OUTPATIENT)
Dept: INTERNAL MEDICINE CLINIC | Facility: CLINIC | Age: 25
End: 2020-10-29

## 2020-10-30 ENCOUNTER — TELEPHONE (OUTPATIENT)
Dept: INTERNAL MEDICINE CLINIC | Facility: CLINIC | Age: 25
End: 2020-10-30

## 2020-10-31 ENCOUNTER — TELEPHONE (OUTPATIENT)
Dept: INTERNAL MEDICINE CLINIC | Facility: CLINIC | Age: 25
End: 2020-10-31

## 2020-11-03 ENCOUNTER — TELEPHONE (OUTPATIENT)
Dept: OBGYN CLINIC | Age: 25
End: 2020-11-03

## 2021-01-05 ENCOUNTER — TELEPHONE (OUTPATIENT)
Dept: OBGYN CLINIC | Facility: CLINIC | Age: 26
End: 2021-01-05

## 2021-01-05 DIAGNOSIS — Z30.013 ENCOUNTER FOR INITIAL PRESCRIPTION OF INJECTABLE CONTRACEPTIVE: ICD-10-CM

## 2021-01-05 RX ORDER — MEDROXYPROGESTERONE ACETATE 150 MG/ML
150 INJECTION, SUSPENSION INTRAMUSCULAR
Qty: 1 ML | Refills: 0 | Status: SHIPPED | OUTPATIENT
Start: 2021-01-05 | End: 2021-04-05

## 2021-01-13 ENCOUNTER — CLINICAL SUPPORT (OUTPATIENT)
Dept: OBGYN CLINIC | Age: 26
End: 2021-01-13
Payer: COMMERCIAL

## 2021-01-13 VITALS — WEIGHT: 174 LBS | BODY MASS INDEX: 28.51 KG/M2 | SYSTOLIC BLOOD PRESSURE: 114 MMHG | DIASTOLIC BLOOD PRESSURE: 72 MMHG

## 2021-01-13 DIAGNOSIS — Z30.42 SURVEILLANCE FOR DEPO-PROVERA CONTRACEPTION: Primary | ICD-10-CM

## 2021-01-13 PROCEDURE — 96372 THER/PROPH/DIAG INJ SC/IM: CPT | Performed by: NURSE PRACTITIONER

## 2021-01-13 RX ORDER — MEDROXYPROGESTERONE ACETATE 150 MG/ML
150 INJECTION, SUSPENSION INTRAMUSCULAR ONCE
Status: COMPLETED | OUTPATIENT
Start: 2021-01-13 | End: 2021-01-13

## 2021-01-13 RX ADMIN — MEDROXYPROGESTERONE ACETATE 150 MG: 150 INJECTION, SUSPENSION INTRAMUSCULAR at 15:33

## 2021-01-13 NOTE — PROGRESS NOTES
Patient is here for depo injection, administered on left deltoid, tolerated well      [facility] # [id number]  [id number]  Exp: 01/23  Next due with annual 3/31-4/13

## 2021-04-02 DIAGNOSIS — Z30.013 ENCOUNTER FOR INITIAL PRESCRIPTION OF INJECTABLE CONTRACEPTIVE: ICD-10-CM

## 2021-04-05 RX ORDER — MEDROXYPROGESTERONE ACETATE 150 MG/ML
150 INJECTION, SUSPENSION INTRAMUSCULAR
Qty: 1 ML | Refills: 1 | Status: SHIPPED | OUTPATIENT
Start: 2021-04-05 | End: 2021-04-26 | Stop reason: SDUPTHER

## 2021-04-26 ENCOUNTER — ANNUAL EXAM (OUTPATIENT)
Dept: OBGYN CLINIC | Age: 26
End: 2021-04-26
Payer: COMMERCIAL

## 2021-04-26 VITALS
SYSTOLIC BLOOD PRESSURE: 104 MMHG | BODY MASS INDEX: 26.55 KG/M2 | HEIGHT: 66 IN | WEIGHT: 165.2 LBS | DIASTOLIC BLOOD PRESSURE: 66 MMHG

## 2021-04-26 DIAGNOSIS — Z01.419 ENCOUNTER FOR ANNUAL ROUTINE GYNECOLOGICAL EXAMINATION: Primary | ICD-10-CM

## 2021-04-26 DIAGNOSIS — Z30.42 SURVEILLANCE FOR DEPO-PROVERA CONTRACEPTION: ICD-10-CM

## 2021-04-26 PROCEDURE — 96372 THER/PROPH/DIAG INJ SC/IM: CPT | Performed by: NURSE PRACTITIONER

## 2021-04-26 PROCEDURE — 99395 PREV VISIT EST AGE 18-39: CPT | Performed by: NURSE PRACTITIONER

## 2021-04-26 RX ORDER — MEDROXYPROGESTERONE ACETATE 150 MG/ML
150 INJECTION, SUSPENSION INTRAMUSCULAR ONCE
Status: DISCONTINUED | OUTPATIENT
Start: 2021-04-26 | End: 2022-03-17

## 2021-04-26 RX ORDER — MEDROXYPROGESTERONE ACETATE 150 MG/ML
150 INJECTION, SUSPENSION INTRAMUSCULAR
Qty: 1 ML | Refills: 3 | Status: SHIPPED | OUTPATIENT
Start: 2021-04-26 | End: 2021-09-29 | Stop reason: SDUPTHER

## 2021-04-26 NOTE — PROGRESS NOTES
Patient is here for yearly as well as depo, missed apt, UPT completed today - negative  Depo administered on left gluteus, tolerated well      NDC#: [address]-4  Lot#: [id number]  Exp: 06/30/23

## 2021-04-26 NOTE — PATIENT INSTRUCTIONS
Medroxyprogesterone (By injection)   Medroxyprogesterone (op-crmy-iz-proe-SERGE-ter-one)  Prevents pregnancy  Also treats endometriosis and is used with other medicines to help relieve symptoms of cancer, including uterine or kidney cancer  Brand Name(s): Depo-Provera, Depo-Provera Contraceptive, Depo-SubQ Provera 104, medroxyPROGESTERone acetate Novaplus   There may be other brand names for this medicine  When This Medicine Should Not Be Used: This medicine is not right for everyone  You should not receive it if you had an allergic reaction to medroxyprogesterone or if you have a history of breast cancer or blood clots (including heart attack or stroke)  In most cases, you should not use this medicine while you are pregnant  How to Use This Medicine:   Injectable  · A nurse or other health provider will give you this medicine  This medicine is given as a shot into a muscle or just under the skin  · Your exact treatment schedule depends on the reason you are using this medicine  You doctor will explain your personal schedule  ? For treatment of cancer symptoms, you may start with a shot once per week  You may need fewer shots as your treatment goes forward  ? For birth control or endometriosis, you will need a shot every 3 months (13 weeks)  ? You might need to have the first shot during the first 5 days of your normal menstrual period, to make sure you are not pregnant  If you have just had a baby, you may receive a shot 5 days after birth if you are not breastfeeding or 6 weeks after birth if you are breastfeeding  · Read and follow the patient instructions that come with this medicine  Talk to your doctor or pharmacist if you have any questions  · Missed dose: You must receive a shot every 3 months if you want to prevent pregnancy  Talk to your doctor or pharmacist if you do not receive your medicine on time, because you may need another form of birth control    Drugs and Foods to Avoid:   Ask your doctor or pharmacist before using any other medicine, including over-the-counter medicines, vitamins, and herbal products  · Some medicines can affect how medroxyprogesterone works  Tell your doctor if you are using any of the following:  ? Aminoglutethimide, bosentan, carbamazepine, felbamate, griseofulvin, mitotane, modafinil, nefazodone, oxcarbazepine, phenobarbital, phenytoin, rifabutin, rifampin, rifapentine, Johnathan's wort, topiramate  ? Medicine to treat an infection (including clarithromycin, itraconazole, ketoconazole, telithromycin, voriconazole)  ? Medicine to treat HIV/AIDS (including atazanavir, efavirenz, indinavir, nelfinavir, ritonavir, saquinavir)  Warnings While Using This Medicine:   · Tell your doctor right away if you think you have become pregnant  · Tell your doctor if you are breastfeeding, or if you have kidney disease, liver disease, asthma, diabetes, heart disease, seizures, migraine headaches, an eating disorder, osteoporosis, or a history of depression  Tell your doctor if you smoke  · This medicine may cause the following problems:  ? Blood clots, which could lead to stroke, heart attack, or other serious problems  ? Possible increased risk of breast cancer  ? Weak or thin bones, especially with long-term use  · You should not use this medicine for long-term birth control unless you cannot use any other form of birth control  · This medicine will not protect you from HIV/AIDS or other sexually transmitted diseases  · Tell any doctor or dentist who treats you that you are using this medicine  This medicine may affect certain medical test results  · Your doctor will check your progress and the effects of this medicine at regular visits  Keep all appointments    Possible Side Effects While Using This Medicine:   Call your doctor right away if you notice any of these side effects:  · Allergic reaction: Itching or hives, swelling in your face or hands, swelling or tingling in your mouth or throat, chest tightness, trouble breathing  · Chest pain, trouble breathing, or coughing up blood  · Dark urine or pale stools, nausea, vomiting, loss of appetite, stomach pain, yellow skin or eyes  · Heavy or nonstop vaginal bleeding  · Loss of vision, double vision  · Numbness or weakness on one side of your body, sudden or severe headache, problems with vision, speech, or walking  · Severe stomach pain or cramps  If you notice these less serious side effects, talk with your doctor:   · Headache  · Light or missed monthly periods, spotting between periods  · Nervousness or dizziness  · Pain, redness, burning, swelling, or a lump under your skin where the shot was given  · Weight gain  If you notice other side effects that you think are caused by this medicine, tell your doctor  Call your doctor for medical advice about side effects  You may report side effects to FDA at 1-092-FDA-5005  © Copyright [address] [place] [address] Drive Information is for End User's use only and may not be sold, redistributed or otherwise used for commercial purposes  The above information is an  only  It is not intended as medical advice for individual conditions or treatments  Talk to your doctor, nurse or pharmacist before following any medical regimen to see if it is safe and effective for you
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
## 2021-07-12 ENCOUNTER — TELEPHONE (OUTPATIENT)
Dept: INTERNAL MEDICINE CLINIC | Facility: CLINIC | Age: 26
End: 2021-07-12

## 2021-07-14 ENCOUNTER — OFFICE VISIT (OUTPATIENT)
Dept: INTERNAL MEDICINE CLINIC | Facility: CLINIC | Age: 26
End: 2021-07-14
Payer: COMMERCIAL

## 2021-07-14 ENCOUNTER — CLINICAL SUPPORT (OUTPATIENT)
Dept: OBGYN CLINIC | Facility: CLINIC | Age: 26
End: 2021-07-14
Payer: COMMERCIAL

## 2021-07-14 VITALS — DIASTOLIC BLOOD PRESSURE: 60 MMHG | SYSTOLIC BLOOD PRESSURE: 120 MMHG | BODY MASS INDEX: 27.37 KG/M2 | HEIGHT: 66 IN

## 2021-07-14 VITALS
RESPIRATION RATE: 12 BRPM | HEIGHT: 66 IN | WEIGHT: 167 LBS | TEMPERATURE: 98.4 F | HEART RATE: 80 BPM | SYSTOLIC BLOOD PRESSURE: 110 MMHG | BODY MASS INDEX: 26.84 KG/M2 | DIASTOLIC BLOOD PRESSURE: 70 MMHG

## 2021-07-14 DIAGNOSIS — Z72.51 UNPROTECTED SEX: ICD-10-CM

## 2021-07-14 DIAGNOSIS — D57.3 SICKLE CELL TRAIT (HCC): Chronic | ICD-10-CM

## 2021-07-14 DIAGNOSIS — Z30.42 ENCOUNTER FOR DEPO-PROVERA CONTRACEPTION: Primary | ICD-10-CM

## 2021-07-14 DIAGNOSIS — Z00.00 HEALTHCARE MAINTENANCE: Primary | ICD-10-CM

## 2021-07-14 DIAGNOSIS — Z11.1 SCREENING-PULMONARY TB: ICD-10-CM

## 2021-07-14 LAB — SL AMB POCT URINE HCG: NORMAL

## 2021-07-14 PROCEDURE — 1036F TOBACCO NON-USER: CPT | Performed by: NURSE PRACTITIONER

## 2021-07-14 PROCEDURE — 81025 URINE PREGNANCY TEST: CPT | Performed by: OBSTETRICS & GYNECOLOGY

## 2021-07-14 PROCEDURE — 96372 THER/PROPH/DIAG INJ SC/IM: CPT | Performed by: OBSTETRICS & GYNECOLOGY

## 2021-07-14 PROCEDURE — 86580 TB INTRADERMAL TEST: CPT

## 2021-07-14 PROCEDURE — 3725F SCREEN DEPRESSION PERFORMED: CPT | Performed by: NURSE PRACTITIONER

## 2021-07-14 PROCEDURE — 99213 OFFICE O/P EST LOW 20 MIN: CPT | Performed by: NURSE PRACTITIONER

## 2021-07-14 PROCEDURE — 3008F BODY MASS INDEX DOCD: CPT | Performed by: NURSE PRACTITIONER

## 2021-07-14 RX ORDER — MEDROXYPROGESTERONE ACETATE 150 MG/ML
150 INJECTION, SUSPENSION INTRAMUSCULAR
Status: DISCONTINUED | OUTPATIENT
Start: 2021-07-14 | End: 2022-03-17

## 2021-07-14 RX ADMIN — MEDROXYPROGESTERONE ACETATE 150 MG: 150 INJECTION, SUSPENSION INTRAMUSCULAR at 10:38

## 2021-07-14 NOTE — PROGRESS NOTES
INTERNAL MEDICINE FOLLOW-UP OFFICE VISIT  [facility] - [facility] [address] [place] [address]    NAME: Keira Hernandez  AGE: 30 y o  SEX: female    DATE OF ENCOUNTER: 7/14/2021   Assessment and Plan:   Healthy 29 yo female  Patient declines routine lab testing at this time  Advised to continue follow up with gyn for her depo shots  PPD given today will return in two days to have this read  Problem List Items Addressed This Visit        Other    Sickle cell trait ([address] ) (Chronic)      Other Visit Diagnoses     Healthcare maintenance    -  Primary    Screening-pulmonary TB        Relevant Orders    TB Skin Test (Completed)          Return in about 1 year (around 7/14/2022), or if symptoms worsen or fail to improve, for Annual physical      Counseling:     · Medication Side Effects - Adverse side effects of medications were reviewed with the patient/guardian today: Yes  · Counseling was given regarding: Prognosis, Risks and benefits of tx options, Intructions for management, Patient and family education, Importance of tx compliance, Risk factor reductions and Impressions  · Barriers to treatment include: No identified barriers      Chief Complaint:     Chief Complaint   Patient presents with    Physical Exam        History of Present Illness:     Patient here for work physical  No changes in medical history since last year  Patient had yearly follow up with GYN this year  She takes depo injections for birth control  Labs last year showed low iron and anemia, which was corrected after supplementation  Does not wear glasses or contacts and no vision complaints  She does have dentures and follows with a dentist regularly  She denies any concerns or complaints today  She needs PPD testing completed today for her employer         The following portions of the patient's history were reviewed and updated as appropriate: allergies, current medications, past family history, past medical history, past social history, past surgical history and problem list      Review of Systems:     Review of Systems   Constitutional: Negative for chills, fatigue and fever  HENT: Positive for nosebleeds (had a nose bleed this morning  )  Negative for congestion, dental problem and sore throat  Eyes: Negative for visual disturbance  Respiratory: Negative for cough and chest tightness  Cardiovascular: Negative for leg swelling  Gastrointestinal: Negative for abdominal pain, constipation and diarrhea  Genitourinary: Negative for dysuria and menstrual problem  Musculoskeletal: Negative for arthralgias and myalgias  Skin: Negative for rash  Neurological: Negative for dizziness and headaches  Psychiatric/Behavioral: Negative for dysphoric mood and sleep disturbance  The patient is not nervous/anxious  Problem List:     Patient Active Problem List   Diagnosis    Sickle cell trait ([facility] [address] )    Surveillance for Depo-Provera contraception    Encounter for annual routine gynecological examination        Objective:     /70 (BP Location: Left arm, Patient Position: Sitting, Cuff Size: Standard)   Pulse 80   Temp 98 4 °F (36 9 °C) (Tympanic)   Resp 12   Ht 5' 5 5" (1 664 m)   Wt 75 8 kg (167 lb)   BMI 27 37 kg/m²     Physical Exam  Vitals reviewed  Constitutional:       General: She is not in acute distress  Appearance: Normal appearance  She is well-developed  She is not diaphoretic  HENT:      Head: Normocephalic and atraumatic  Right Ear: Hearing, tympanic membrane, ear canal and external ear normal       Left Ear: Hearing, tympanic membrane, ear canal and external ear normal       Nose: Nose normal  No mucosal edema or rhinorrhea  Mouth/Throat:      Dentition: No dental caries  Pharynx: Uvula midline  No oropharyngeal exudate  Eyes:      General: Lids are normal          Right eye: No discharge  Left eye: No discharge        Conjunctiva/sclera: Conjunctivae normal  Pupils: Pupils are equal, round, and reactive to light  Neck:      Thyroid: No thyromegaly  Trachea: Trachea and phonation normal  No tracheal deviation  Cardiovascular:      Rate and Rhythm: Normal rate and regular rhythm  Pulses: Normal pulses  Heart sounds: Normal heart sounds  No murmur heard  Pulmonary:      Effort: Pulmonary effort is normal  No respiratory distress  Breath sounds: Normal breath sounds  No wheezing  Abdominal:      General: Bowel sounds are normal  There is no distension  Palpations: Abdomen is soft  There is no hepatomegaly or splenomegaly  Tenderness: There is no abdominal tenderness  Musculoskeletal:         General: No tenderness  Normal range of motion  Cervical back: Normal range of motion and neck supple  Lymphadenopathy:      Cervical: No cervical adenopathy  Skin:     General: Skin is warm and dry  Capillary Refill: Capillary refill takes less than 2 seconds  Findings: No rash  Neurological:      Mental Status: She is alert and oriented to person, place, and time  Sensory: No sensory deficit  Psychiatric:         Speech: Speech normal          Behavior: Behavior normal          Thought Content: Thought content normal          Judgment: Judgment normal          Pertinent Laboratory/Diagnostic Studies:    Laboratory Results: I have personally reviewed the pertinent laboratory results/reports   Radiology/Other Diagnostic Testing Results: I have personally reviewed pertinent reports         Current Medications:     Current Outpatient Medications   Medication Sig Dispense Refill    medroxyPROGESTERone (DEPO-PROVERA) 150 mg/mL injection Inject 1 mL (150 mg total) into a muscle every 3 (three) months 1 mL 3     Current Facility-Administered Medications   Medication Dose Route Frequency Provider Last Rate Last Admin    medroxyPROGESTERone (DEPO-PROVERA) IM injection 150 mg  150 mg Intramuscular Once GARLAND Oliva Patient Instructions   Good fat  Good fats come mainly from vegetables, nuts, seeds, and fish  They differ from saturated fats by having fewer hydrogen atoms bonded to their carbon chains  Healthy fats are liquid at room temperature, not solid  There are two broad categories of beneficial fats: monounsaturated and polyunsaturated fats  Monounsaturated fats  When you dip your bread in olive oil at an [place], you're getting mostly monounsaturated fat  Monounsaturated fats have a single carbon-to-carbon double bond  The result is that it has two fewer hydrogen atoms than a saturated fat and a bend at the double bond  This structure keeps monounsaturated fats liquid at room temperature  Good sources of monounsaturated fats are olive oil, peanut oil, canola oil, avocados, and most nuts, as well as high-oleic safflower and sunflower oils  The discovery that monounsaturated fat could be healthful came from the [place] Study during the 1960s  It revealed that people in [place] and other parts of the [facility] enjoyed a low rate of heart disease despite a high-fat diet  The main fat in their diet, though, was not the saturated animal fat common in countries with higher rates of heart disease  It was olive oil, which contains mainly monounsaturated fat  This finding produced a surge of interest in olive oil and the "Mediterranean diet," a style of eating regarded as a healthful choice today  Although there's no recommended daily intake of monounsaturated fats, the [place] of Medicine recommends using them as much as possible along with polyunsaturated fats to replace saturated and trans fats  Polyunsaturated fats  When you pour liquid cooking oil into a pan, there's a good chance you're using polyunsaturated fat  Corn oil, sunflower oil, and safflower oil are common examples  Polyunsaturated fats are essential fats   That means they're required for normal body functions but your body can't make them  So you must get them from food  Polyunsaturated fats are used to build cell membranes and the covering of nerves  They are needed for blood clotting, muscle movement, and inflammation  A polyunsaturated fat has two or more double bonds in its carbon chain  There are two main types of polyunsaturated fats: omega-3 fatty acids and omega-6 fatty acids  The numbers refer to the distance between the beginning of the carbon chain and the first double bond  Both types offer health benefits  Eating polyunsaturated fats in place of saturated fats or highly refined carbohydrates reduces harmful LDL cholesterol and improves the cholesterol profile  It also lowers triglycerides  Good sources of omega-3 fatty acids include fatty fish such as salmon, mackerel, and sardines, flaxseeds, walnuts, canola oil, and unhydrogenated soybean oil  Omega-3 fatty acids may help prevent and even treat heart disease and stroke  In addition to reducing blood pressure, raising HDL, and lowering triglycerides, polyunsaturated fats may help prevent lethal heart rhythms from arising  Evidence also suggests they may help reduce the need for corticosteroid medications in people with rheumatoid arthritis  Studies linking omega-3s to a wide range of other health improvements, including reducing risk of dementia, are inconclusive, and some of them have major flaws, according to a systematic review of the evidence by the Agency for Healthcare Research and Quality  Omega-6 fatty acids have also been linked to protection against heart disease  Foods rich in linoleic acid and other omega-6 fatty acids include vegetable oils such as safflower, soybean, sunflower, walnut, and corn oils              GARLAND Francis  [facility]

## 2021-07-14 NOTE — PROGRESS NOTES
Pt is here for Depo Provera injection  Her last dose was 04/26/2021  Her annual exam was on 04/26/2021  Urine pregnancy test done: YES   Result: Negative  Depo given in Right Buttock  Tolerated well  YES  Lot [id number] 03/31/2023  Next dose due 09/29/2021-10/13/2021  NDC# [id number]

## 2021-07-14 NOTE — PATIENT INSTRUCTIONS
Good fat  Good fats come mainly from vegetables, nuts, seeds, and fish  They differ from saturated fats by having fewer hydrogen atoms bonded to their carbon chains  Healthy fats are liquid at room temperature, not solid  There are two broad categories of beneficial fats: monounsaturated and polyunsaturated fats  Monounsaturated fats  When you dip your bread in olive oil at an [place], you're getting mostly monounsaturated fat  Monounsaturated fats have a single carbon-to-carbon double bond  The result is that it has two fewer hydrogen atoms than a saturated fat and a bend at the double bond  This structure keeps monounsaturated fats liquid at room temperature  Good sources of monounsaturated fats are olive oil, peanut oil, canola oil, avocados, and most nuts, as well as high-oleic safflower and sunflower oils  The discovery that monounsaturated fat could be healthful came from the [place] Study during the 1960s  It revealed that people in [place] and other parts of the [facility][note 3][facility] enjoyed a low rate of heart disease despite a high-fat diet  The main fat in their diet, though, was not the saturated animal fat common in countries with higher rates of heart disease  It was olive oil, which contains mainly monounsaturated fat  This finding produced a surge of interest in olive oil and the "Mediterranean diet," a style of eating regarded as a healthful choice today  Although there's no recommended daily intake of monounsaturated fats, the [place] of Medicine recommends using them as much as possible along with polyunsaturated fats to replace saturated and trans fats  Polyunsaturated fats  When you pour liquid cooking oil into a pan, there's a good chance you're using polyunsaturated fat  Corn oil, sunflower oil, and safflower oil are common examples  Polyunsaturated fats are essential fats  That means they're required for normal body functions but your body can't make them   So you must get them from food  Polyunsaturated fats are used to build cell membranes and the covering of nerves  They are needed for blood clotting, muscle movement, and inflammation  A polyunsaturated fat has two or more double bonds in its carbon chain  There are two main types of polyunsaturated fats: omega-3 fatty acids and omega-6 fatty acids  The numbers refer to the distance between the beginning of the carbon chain and the first double bond  Both types offer health benefits  Eating polyunsaturated fats in place of saturated fats or highly refined carbohydrates reduces harmful LDL cholesterol and improves the cholesterol profile  It also lowers triglycerides  Good sources of omega-3 fatty acids include fatty fish such as salmon, mackerel, and sardines, flaxseeds, walnuts, canola oil, and unhydrogenated soybean oil  Omega-3 fatty acids may help prevent and even treat heart disease and stroke  In addition to reducing blood pressure, raising HDL, and lowering triglycerides, polyunsaturated fats may help prevent lethal heart rhythms from arising  Evidence also suggests they may help reduce the need for corticosteroid medications in people with rheumatoid arthritis  Studies linking omega-3s to a wide range of other health improvements, including reducing risk of dementia, are inconclusive, and some of them have major flaws, according to a systematic review of the evidence by the Agency for Healthcare Research and Quality  Omega-6 fatty acids have also been linked to protection against heart disease  Foods rich in linoleic acid and other omega-6 fatty acids include vegetable oils such as safflower, soybean, sunflower, walnut, and corn oils

## 2021-07-16 LAB
INDURATION: 0 MM
TB SKIN TEST: NEGATIVE

## 2021-09-29 ENCOUNTER — CLINICAL SUPPORT (OUTPATIENT)
Dept: OBGYN CLINIC | Age: 26
End: 2021-09-29
Payer: COMMERCIAL

## 2021-09-29 VITALS
HEIGHT: 65 IN | DIASTOLIC BLOOD PRESSURE: 76 MMHG | SYSTOLIC BLOOD PRESSURE: 120 MMHG | WEIGHT: 168.4 LBS | BODY MASS INDEX: 28.06 KG/M2

## 2021-09-29 DIAGNOSIS — Z30.42 SURVEILLANCE FOR DEPO-PROVERA CONTRACEPTION: ICD-10-CM

## 2021-09-29 DIAGNOSIS — Z30.42 SURVEILLANCE FOR DEPO-PROVERA CONTRACEPTION: Primary | ICD-10-CM

## 2021-09-29 PROCEDURE — 96372 THER/PROPH/DIAG INJ SC/IM: CPT | Performed by: NURSE PRACTITIONER

## 2021-09-29 RX ORDER — MEDROXYPROGESTERONE ACETATE 150 MG/ML
150 INJECTION, SUSPENSION INTRAMUSCULAR
Qty: 1 ML | Refills: 3 | Status: SHIPPED | OUTPATIENT
Start: 2021-09-29 | End: 2022-03-09

## 2021-09-29 RX ORDER — MEDROXYPROGESTERONE ACETATE 150 MG/ML
150 INJECTION, SUSPENSION INTRAMUSCULAR ONCE
Status: COMPLETED | OUTPATIENT
Start: 2021-09-29 | End: 2021-09-29

## 2021-09-29 RX ADMIN — MEDROXYPROGESTERONE ACETATE 150 MG: 150 INJECTION, SUSPENSION INTRAMUSCULAR at 13:47

## 2021-09-29 NOTE — PROGRESS NOTES
Pt is here for Depo Provera injection  Her last dose was 7/14/21  Her annual exam was on 4/26/21  Depo given in R Buttock  Tolerated well    Lot [id number] Exp 9/30/2025  Next dose due 12/15-12/29

## 2021-12-15 ENCOUNTER — OFFICE VISIT (OUTPATIENT)
Dept: OBGYN CLINIC | Age: 26
End: 2021-12-15
Payer: COMMERCIAL

## 2021-12-15 ENCOUNTER — APPOINTMENT (OUTPATIENT)
Dept: LAB | Facility: CLINIC | Age: 26
End: 2021-12-15
Payer: COMMERCIAL

## 2021-12-15 VITALS
WEIGHT: 177 LBS | BODY MASS INDEX: 29.49 KG/M2 | SYSTOLIC BLOOD PRESSURE: 106 MMHG | DIASTOLIC BLOOD PRESSURE: 66 MMHG | HEIGHT: 65 IN

## 2021-12-15 DIAGNOSIS — Z11.3 SCREENING EXAMINATION FOR STD (SEXUALLY TRANSMITTED DISEASE): Primary | ICD-10-CM

## 2021-12-15 DIAGNOSIS — N89.8 VAGINAL DISCHARGE: ICD-10-CM

## 2021-12-15 DIAGNOSIS — Z11.3 SCREENING EXAMINATION FOR STD (SEXUALLY TRANSMITTED DISEASE): ICD-10-CM

## 2021-12-15 DIAGNOSIS — Z30.42 SURVEILLANCE FOR DEPO-PROVERA CONTRACEPTION: ICD-10-CM

## 2021-12-15 PROCEDURE — 86592 SYPHILIS TEST NON-TREP QUAL: CPT

## 2021-12-15 PROCEDURE — 86803 HEPATITIS C AB TEST: CPT

## 2021-12-15 PROCEDURE — 81514 NFCT DS BV&VAGINITIS DNA ALG: CPT | Performed by: NURSE PRACTITIONER

## 2021-12-15 PROCEDURE — 36415 COLL VENOUS BLD VENIPUNCTURE: CPT

## 2021-12-15 PROCEDURE — 99213 OFFICE O/P EST LOW 20 MIN: CPT | Performed by: NURSE PRACTITIONER

## 2021-12-15 PROCEDURE — 87491 CHLMYD TRACH DNA AMP PROBE: CPT | Performed by: NURSE PRACTITIONER

## 2021-12-15 PROCEDURE — 96372 THER/PROPH/DIAG INJ SC/IM: CPT | Performed by: NURSE PRACTITIONER

## 2021-12-15 PROCEDURE — 3008F BODY MASS INDEX DOCD: CPT | Performed by: NURSE PRACTITIONER

## 2021-12-15 PROCEDURE — 87340 HEPATITIS B SURFACE AG IA: CPT

## 2021-12-15 PROCEDURE — 87591 N.GONORRHOEAE DNA AMP PROB: CPT | Performed by: NURSE PRACTITIONER

## 2021-12-15 PROCEDURE — 1036F TOBACCO NON-USER: CPT | Performed by: NURSE PRACTITIONER

## 2021-12-15 PROCEDURE — 87389 HIV-1 AG W/HIV-1&-2 AB AG IA: CPT

## 2021-12-15 RX ORDER — MEDROXYPROGESTERONE ACETATE 150 MG/ML
150 INJECTION, SUSPENSION INTRAMUSCULAR ONCE
Status: COMPLETED | OUTPATIENT
Start: 2021-12-15 | End: 2021-12-15

## 2021-12-15 RX ADMIN — MEDROXYPROGESTERONE ACETATE 150 MG: 150 INJECTION, SUSPENSION INTRAMUSCULAR at 08:58

## 2021-12-16 LAB
HBV SURFACE AG SER QL: NORMAL
HCV AB SER QL: NORMAL
HIV 1+2 AB+HIV1 P24 AG SERPL QL IA: NORMAL
RPR SER QL: NORMAL

## 2021-12-17 LAB
C GLABRATA DNA VAG QL NAA+PROBE: NEGATIVE
C KRUSEI DNA VAG QL NAA+PROBE: NEGATIVE
CANDIDA SP 6 PNL VAG NAA+PROBE: NEGATIVE
T VAGINALIS DNA VAG QL NAA+PROBE: NEGATIVE
VAGINOSIS/ITIS DNA PNL VAG PROBE+SIG AMP: POSITIVE

## 2021-12-18 LAB
C TRACH DNA SPEC QL NAA+PROBE: NEGATIVE
N GONORRHOEA DNA SPEC QL NAA+PROBE: NEGATIVE

## 2021-12-20 ENCOUNTER — TELEPHONE (OUTPATIENT)
Dept: OBGYN CLINIC | Facility: CLINIC | Age: 26
End: 2021-12-20

## 2022-03-17 ENCOUNTER — OFFICE VISIT (OUTPATIENT)
Dept: OBGYN CLINIC | Age: 27
End: 2022-03-17
Payer: COMMERCIAL

## 2022-03-17 VITALS
WEIGHT: 173 LBS | BODY MASS INDEX: 28.82 KG/M2 | HEIGHT: 65 IN | SYSTOLIC BLOOD PRESSURE: 118 MMHG | DIASTOLIC BLOOD PRESSURE: 78 MMHG

## 2022-03-17 DIAGNOSIS — Z30.42 SURVEILLANCE FOR DEPO-PROVERA CONTRACEPTION: ICD-10-CM

## 2022-03-17 DIAGNOSIS — N89.8 VAGINAL DISCHARGE: Primary | ICD-10-CM

## 2022-03-17 DIAGNOSIS — Z11.3 SCREENING EXAMINATION FOR STD (SEXUALLY TRANSMITTED DISEASE): ICD-10-CM

## 2022-03-17 PROBLEM — Z01.419 ENCOUNTER FOR ANNUAL ROUTINE GYNECOLOGICAL EXAMINATION: Status: RESOLVED | Noted: 2021-04-26 | Resolved: 2022-03-17

## 2022-03-17 PROCEDURE — 3008F BODY MASS INDEX DOCD: CPT | Performed by: NURSE PRACTITIONER

## 2022-03-17 PROCEDURE — 87661 TRICHOMONAS VAGINALIS AMPLIF: CPT | Performed by: NURSE PRACTITIONER

## 2022-03-17 PROCEDURE — 99213 OFFICE O/P EST LOW 20 MIN: CPT | Performed by: NURSE PRACTITIONER

## 2022-03-17 PROCEDURE — 87210 SMEAR WET MOUNT SALINE/INK: CPT | Performed by: NURSE PRACTITIONER

## 2022-03-17 PROCEDURE — 96372 THER/PROPH/DIAG INJ SC/IM: CPT | Performed by: NURSE PRACTITIONER

## 2022-03-17 RX ORDER — MEDROXYPROGESTERONE ACETATE 150 MG/ML
150 INJECTION, SUSPENSION INTRAMUSCULAR ONCE
Status: COMPLETED | OUTPATIENT
Start: 2022-03-17 | End: 2022-03-17

## 2022-03-17 RX ORDER — MEDROXYPROGESTERONE ACETATE 150 MG/ML
INJECTION, SUSPENSION INTRAMUSCULAR
COMMUNITY
Start: 2022-03-15 | End: 2022-03-17 | Stop reason: SDUPTHER

## 2022-03-17 RX ORDER — METRONIDAZOLE 500 MG/1
500 TABLET ORAL EVERY 12 HOURS SCHEDULED
Qty: 14 TABLET | Refills: 0 | Status: SHIPPED | OUTPATIENT
Start: 2022-03-17 | End: 2022-03-24

## 2022-03-17 RX ADMIN — MEDROXYPROGESTERONE ACETATE 150 MG: 150 INJECTION, SUSPENSION INTRAMUSCULAR at 15:46
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
## 2022-03-17 NOTE — PROGRESS NOTES
Date last pap:11/11/19   Last Depo-Provera: 12/15/21  Side Effects if any: none  Serum HCG indicated none  Depo-Provera 150 mg IM given by: Jeff Cr  Next appointment due 06/2-0617

## 2022-03-20 LAB — T VAGINALIS RRNA SPEC QL NAA+PROBE: NEGATIVE

## 2022-07-14 ENCOUNTER — ANNUAL EXAM (OUTPATIENT)
Dept: OBGYN CLINIC | Age: 27
End: 2022-07-14
Payer: COMMERCIAL

## 2022-07-14 VITALS
HEIGHT: 65 IN | WEIGHT: 168 LBS | DIASTOLIC BLOOD PRESSURE: 74 MMHG | SYSTOLIC BLOOD PRESSURE: 118 MMHG | BODY MASS INDEX: 27.99 KG/M2

## 2022-07-14 DIAGNOSIS — Z30.42 SURVEILLANCE FOR DEPO-PROVERA CONTRACEPTION: ICD-10-CM

## 2022-07-14 DIAGNOSIS — Z32.02 NEGATIVE PREGNANCY TEST: ICD-10-CM

## 2022-07-14 DIAGNOSIS — B96.89 BV (BACTERIAL VAGINOSIS): ICD-10-CM

## 2022-07-14 DIAGNOSIS — N76.0 BV (BACTERIAL VAGINOSIS): ICD-10-CM

## 2022-07-14 DIAGNOSIS — N89.8 VAGINAL DISCHARGE: ICD-10-CM

## 2022-07-14 DIAGNOSIS — Z01.419 ENCOUNTER FOR GYNECOLOGICAL EXAMINATION WITHOUT ABNORMAL FINDING: Primary | ICD-10-CM

## 2022-07-14 DIAGNOSIS — Z30.42 ENCOUNTER FOR DEPO-PROVERA CONTRACEPTION: ICD-10-CM

## 2022-07-14 DIAGNOSIS — Z30.42 ENCOUNTER FOR DEPO-PROVERA CONTRACEPTION: Primary | ICD-10-CM

## 2022-07-14 DIAGNOSIS — Z11.3 SCREEN FOR STD (SEXUALLY TRANSMITTED DISEASE): ICD-10-CM

## 2022-07-14 LAB
BV WHIFF TEST VAG QL: ABNORMAL
CLUE CELLS SPEC QL WET PREP: ABNORMAL
PH SMN: 5.5 [PH]
SL AMB POCT URINE HCG: NORMAL
T VAGINALIS VAG QL WET PREP: ABNORMAL
YEAST VAG QL WET PREP: ABNORMAL

## 2022-07-14 PROCEDURE — 99395 PREV VISIT EST AGE 18-39: CPT

## 2022-07-14 PROCEDURE — G0145 SCR C/V CYTO,THINLAYER,RESCR: HCPCS

## 2022-07-14 PROCEDURE — 87491 CHLMYD TRACH DNA AMP PROBE: CPT

## 2022-07-14 PROCEDURE — 87210 SMEAR WET MOUNT SALINE/INK: CPT

## 2022-07-14 PROCEDURE — 96372 THER/PROPH/DIAG INJ SC/IM: CPT

## 2022-07-14 PROCEDURE — 81025 URINE PREGNANCY TEST: CPT

## 2022-07-14 PROCEDURE — 0503F POSTPARTUM CARE VISIT: CPT

## 2022-07-14 PROCEDURE — 87591 N.GONORRHOEAE DNA AMP PROB: CPT

## 2022-07-14 RX ORDER — MEDROXYPROGESTERONE ACETATE 150 MG/ML
150 INJECTION, SUSPENSION INTRAMUSCULAR
Qty: 1 ML | Refills: 4 | Status: SHIPPED | OUTPATIENT
Start: 2022-07-14

## 2022-07-14 RX ORDER — MEDROXYPROGESTERONE ACETATE 150 MG/ML
150 INJECTION, SUSPENSION INTRAMUSCULAR ONCE
Status: CANCELLED | OUTPATIENT
Start: 2022-07-14 | End: 2022-07-14

## 2022-07-14 RX ORDER — METRONIDAZOLE 500 MG/1
500 TABLET ORAL EVERY 12 HOURS SCHEDULED
Qty: 14 TABLET | Refills: 0 | Status: SHIPPED | OUTPATIENT
Start: 2022-07-14 | End: 2022-07-21

## 2022-07-14 RX ORDER — MEDROXYPROGESTERONE ACETATE 150 MG/ML
150 INJECTION, SUSPENSION INTRAMUSCULAR ONCE
Status: COMPLETED | OUTPATIENT
Start: 2022-07-14 | End: 2022-07-14

## 2022-07-14 RX ADMIN — MEDROXYPROGESTERONE ACETATE 150 MG: 150 INJECTION, SUSPENSION INTRAMUSCULAR at 15:40
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
## 2022-07-14 NOTE — PATIENT INSTRUCTIONS
Bacterial Vaginosis   AMBULATORY CARE:   Bacterial vaginosis  is an infection in the vagina  It may cause vaginitis (irritation and inflammation of the vagina)  The cause is not known  Bacteria normally found in the vagina are imbalanced  Your risk increases if you are sexually active, you use a douche, or you have an intrauterine device (IUD)  Common signs and symptoms of bacterial vaginosis:   White, gray, or yellow vaginal discharge    Vaginal discharge that smells like fish    Itching or burning around the outside of your vagina    Call your doctor or gynecologist if:   Your symptoms come back or do not improve with treatment  You have vaginal bleeding that is not your monthly period  You have questions or concerns about your condition or care  Antibiotics  are given to kill the bacteria  They may be given as a pill or as a cream to put in your vagina  Bacterial vaginosis and pregnancy:  If you have bacterial vaginosis during pregnancy, your baby may be born early or have a low birth weight  Your healthcare provider may recommend testing for bacterial vaginosis before or during your pregnancy  He or she will talk to you about your risk for premature delivery, and make sure you know the benefits and risks of testing  Prevent bacterial vaginosis:   Keep your vaginal area clean and dry  Wear underwear and pantyhose with a cotton crotch  Wipe from front to back after you urinate or have a bowel movement  After you bathe, rinse soap from your vaginal area to decrease your risk for irritation  Do not use products that cause irritation  Always use unscented tampons or sanitary pads  Do not use feminine sprays, powders, or scented tampons  They may cause irritation and increase your risk for vaginosis  Detergents and fabric softeners may also cause irritation  Do not use a douche  This can cause an imbalance in healthy vaginal bacteria  Use latex condoms during sex    This helps prevent another infection and keeps your partner from getting the infection  Follow up with your doctor or gynecologist as directed: Bacterial vaginosis increases the risk for several health problems, such as pelvic inflammatory disease (PID) or sexually transmitted infections  Work with your healthcare providers to schedule regular appointments to check for health problems  Write down your questions so you remember to ask them during your visits  © Copyright [employer] 2022 Information is for End User's use only and may not be sold, redistributed or otherwise used for commercial purposes  All illustrations and images included in CareNotes® are the copyrighted property of A D A M , Inc  or [facility]   The above information is an  only  It is not intended as medical advice for individual conditions or treatments  Talk to your doctor, nurse or pharmacist before following any medical regimen to see if it is safe and effective for you  Injectable Contraception   WHAT YOU NEED TO KNOW:   What is injectable contraception? Injectable contraception is birth control medicine that is given as a shot  This medicine helps prevent pregnancy  The shot is usually given once every 3 months on day 1 to 5 of your menstrual cycle  The medicine may decrease blood loss and pain during your period  It also decreases your risk for anemia (low red blood cell count)  When can I start to use injectable contraception? Tell your healthcare provider about any medical condition you have  Also tell him or her if you are currently breastfeeding  Your provider will tell you when you can start injectable contraception  You may need to use a different method of contraception for the first 7 days after you get the shot  You may need blood or urine tests before you start this medicine  You may use this method in any of the following situations:  During your menstrual cycle,  you can start to use injectable contraception   You should get your first shot within 5 days after your cycle starts, if you have regular menstrual cycles  If you have irregular bleeding or no periods you may get the shot any time  When you switch methods of contraception,  you may need injectable contraception until your new method is working  This may decrease your risk of becoming pregnant  After you give birth,  your healthcare provider will tell you when to get the shot if you are breastfeeding  If you are not breastfeeding, you may have the shot any time  What are the risks of injectable contraception? Injectable contraception does not protect against sexually transmitted diseases  You may have headaches or changes in your mood  You may have heavy periods or periods that last longer than normal for you  Your periods may stop completely  You may have an upset stomach  You can develop brittle bones and be at higher risk for a fracture  You may gain weight  Certain medicines can prevent injectable contraception from working correctly  How can I care for myself while I use injectable contraception? Do not smoke  Nicotine and other chemicals in cigarettes and cigars increase your risk for a blood clot while you use injectable contraception  Ask your healthcare provider for information if you currently smoke and need help to quit  E-cigarettes or smokeless tobacco still contain nicotine  Talk to your healthcare provider before you use these products  Increase your daily intake of calcium and vitamin D as directed  This will help make your bones stronger and prevent fractures  Foods rich in calcium include milk, yogurt, and cheese  You may need to take to take vitamins with calcium and vitamin D  You should get 1,300 mg of calcium and 400 IU of vitamin D per day when using injectable contraception  Talk to your healthcare provider before you take vitamins  Exercise regularly  Weight-bearing exercise will help you build bone and muscle strength  Walking is an example of a weight-bearing exercise  Ask your healthcare provider about exercises that are right for you  Try to get at least 30 minutes of exercise on most days of the week  Your provider can tell you if you need to exercise more than this  When should I contact my gynecologist or doctor? You have heavy bleeding from your vagina  You have yellow eyes or skin  You have severe pain in your stomach or abdomen  Your period lasts longer than is normal for you  You do not get a period  You have unprotected sex before you have your shots  You have changes in your mood  You have questions or concerns about injectable contraceptives  CARE AGREEMENT:   You have the right to help plan your care  Learn about your health condition and how it may be treated  Discuss treatment options with your healthcare providers to decide what care you want to receive  You always have the right to refuse treatment  The above information is an  only  It is not intended as medical advice for individual conditions or treatments  Talk to your doctor, nurse or pharmacist before following any medical regimen to see if it is safe and effective for you  © Copyright [employer] 2022 Information is for End User's use only and may not be sold, redistributed or otherwise used for commercial purposes   All illustrations and images included in CareNotes® are the copyrighted property of A [employer] A [employer] Inc  or [address] [place] [address] [employer]

## 2022-07-14 NOTE — PROGRESS NOTES
Pt here for yearly exam and to restart depo provera    Menarch: 13  UPT today: neg    Pt returned to office, depo given IM in RG, tolerated well, will schedule next injection

## 2022-07-16 LAB
C TRACH DNA SPEC QL NAA+PROBE: NEGATIVE
N GONORRHOEA DNA SPEC QL NAA+PROBE: NEGATIVE

## 2022-07-19 LAB
LAB AP GYN PRIMARY INTERPRETATION: NORMAL
Lab: NORMAL
PATH INTERP SPEC-IMP: NORMAL

## 2022-10-13 ENCOUNTER — CLINICAL SUPPORT (OUTPATIENT)
Dept: OBGYN CLINIC | Age: 27
End: 2022-10-13

## 2022-10-13 VITALS
DIASTOLIC BLOOD PRESSURE: 82 MMHG | WEIGHT: 169 LBS | SYSTOLIC BLOOD PRESSURE: 116 MMHG | BODY MASS INDEX: 28.16 KG/M2 | HEIGHT: 65 IN

## 2022-10-13 DIAGNOSIS — Z30.42 ENCOUNTER FOR DEPO-PROVERA CONTRACEPTION: Primary | ICD-10-CM

## 2022-10-13 RX ORDER — MEDROXYPROGESTERONE ACETATE 150 MG/ML
150 INJECTION, SUSPENSION INTRAMUSCULAR ONCE
Status: COMPLETED | OUTPATIENT
Start: 2022-10-13 | End: 2022-10-13

## 2022-10-13 RX ADMIN — MEDROXYPROGESTERONE ACETATE 150 MG: 150 INJECTION, SUSPENSION INTRAMUSCULAR at 09:50

## 2022-10-13 NOTE — PATIENT INSTRUCTIONS
Medroxyprogesterone (By injection)   Medroxyprogesterone ([id number]-[id number]-SERGE-ter-one)  Prevents pregnancy  Also treats endometriosis and is used with other medicines to help relieve symptoms of cancer, including uterine or kidney cancer  Brand Name(s): Depo-Provera, Depo-Provera Contraceptive, Depo-SubQ Provera 104, medroxyPROGESTERone acetate Novaplus   There may be other brand names for this medicine  When This Medicine Should Not Be Used: This medicine is not right for everyone  You should not receive it if you had an allergic reaction to medroxyprogesterone or if you have a history of breast cancer or blood clots (including heart attack or stroke)  In most cases, you should not use this medicine while you are pregnant  How to Use This Medicine:   Injectable  A nurse or other health provider will give you this medicine  This medicine is given as a shot into a muscle (usually in the buttocks or upper arm) or just under the skin  Your exact treatment schedule depends on the reason you are using this medicine  You doctor will explain your personal schedule  For treatment of cancer symptoms, you may start with a shot once per week  You may need fewer shots as your treatment goes forward  For birth control or endometriosis, you will need a shot every 3 months (13 weeks)  You might need to have the first shot during the first 5 days of your normal menstrual period, to make sure you are not pregnant  If you have just had a baby, you may receive a shot 5 days after birth if you are not breastfeeding or 6 weeks after birth if you are breastfeeding  Read and follow the patient instructions that come with this medicine  Talk to your doctor or pharmacist if you have any questions  Missed dose: You must receive a shot every 3 months if you want to prevent pregnancy  Talk to your doctor or pharmacist if you do not receive your medicine on time, because you may need another form of birth control    Drugs and Foods to Avoid:   Ask your doctor or pharmacist before using any other medicine, including over-the-counter medicines, vitamins, and herbal products  Some medicines can affect how medroxyprogesterone works  Tell your doctor if you are using any of the following:  Aminoglutethimide, bosentan, carbamazepine, felbamate, griseofulvin, mitotane, modafinil, nefazodone, oxcarbazepine, phenobarbital, phenytoin, rifabutin, rifampin, rifapentine, Johnathan's wort, topiramate  Medicine to treat an infection (including clarithromycin, itraconazole, ketoconazole, telithromycin, voriconazole)  Medicine to treat HIV/AIDS (including atazanavir, efavirenz, indinavir, nelfinavir, ritonavir, saquinavir)  Warnings While Using This Medicine:   Tell your doctor right away if you think you have become pregnant  Tell your doctor if you are breastfeeding, or if you have kidney disease, liver disease, asthma, diabetes, heart disease, seizures, migraine headaches, an eating disorder, osteoporosis, or a history of depression  Tell your doctor if you smoke  This medicine may cause the following problems:  Weak or thin bones, especially with long-term use  Blood clots, which could lead to stroke, heart attack, eye or vision problems, or other serious problems  Possible increased risk of breast cancer  Injection site reactions  Liver problems  Changes in menstrual periods  Fluid retention (edema) and weight gain  You should not use this medicine for long-term birth control unless you cannot use any other form of birth control  This medicine will not protect you from HIV/AIDS or other sexually transmitted diseases  Tell any doctor or dentist who treats you that you are using this medicine  This medicine may affect certain medical test results  Your doctor will do lab tests at regular visits to check on the effects of this medicine  Keep all appointments    Possible Side Effects While Using This Medicine:   Call your doctor right away if you notice any of these side effects: Allergic reaction: Itching or hives, swelling in your face or hands, swelling or tingling in your mouth or throat, chest tightness, trouble breathing  Chest pain, trouble breathing, or coughing up blood  Dark urine or pale stools, nausea, vomiting, loss of appetite, stomach pain, yellow skin or eyes  Heavy or nonstop vaginal bleeding  Loss of vision, double vision  Numbness or weakness on one side of your body, sudden or severe headache, problems with vision, speech, or walking  Rapid weight gain, swelling in your hands, ankles, or feet  Seizures  If you notice these less serious side effects, talk with your doctor:   Light or missed monthly periods, spotting between periods  Nervousness or dizziness  Pain, redness, burning, swelling, or a lump under your skin where the shot was given  If you notice other side effects that you think are caused by this medicine, tell your doctor  Call your doctor for medical advice about side effects  You may report side effects to FDA at 3-606-FDA-2318    © Copyright [employer] 2022 Information is for End User's use only and may not be sold, redistributed or otherwise used for commercial purposes  The above information is an  only  It is not intended as medical advice for individual conditions or treatments  Talk to your doctor, nurse or pharmacist before following any medical regimen to see if it is safe and effective for you

## 2022-10-13 NOTE — PROGRESS NOTES
Pt is here for Depo Provera injection  Her last dose was 7/14/22  Her annual exam was on 07/14/22  Depo given in [facility]    [id number] 10/2024  Next dose due 01/2022  Refill needed yes

## 2022-10-19 ENCOUNTER — OFFICE VISIT (OUTPATIENT)
Dept: INTERNAL MEDICINE CLINIC | Facility: CLINIC | Age: 27
End: 2022-10-19
Payer: COMMERCIAL

## 2022-10-19 ENCOUNTER — TELEPHONE (OUTPATIENT)
Dept: INTERNAL MEDICINE CLINIC | Facility: CLINIC | Age: 27
End: 2022-10-19

## 2022-10-19 VITALS
HEIGHT: 65 IN | DIASTOLIC BLOOD PRESSURE: 70 MMHG | OXYGEN SATURATION: 97 % | SYSTOLIC BLOOD PRESSURE: 102 MMHG | TEMPERATURE: 98.6 F | BODY MASS INDEX: 28.52 KG/M2 | WEIGHT: 171.2 LBS | HEART RATE: 96 BPM

## 2022-10-19 DIAGNOSIS — Z00.00 PHYSICAL EXAM: Primary | ICD-10-CM

## 2022-10-19 DIAGNOSIS — Z01.818 PREOP TESTING: ICD-10-CM

## 2022-10-19 PROCEDURE — 99395 PREV VISIT EST AGE 18-39: CPT

## 2022-10-19 NOTE — LETTER
October 19, 2022     Patient: Queta Peterson  YOB: 1995  Date of Visit: 10/19/2022      To Whom it May Concern:    Queta Peterson is under my professional care  Lizy Guzmán was seen in my office on 10/19/2022  Lizy Guzmán will be having surgery and will need to be excused from work on the following dates:  12/28/2022 to 01/03/2023  If you have any questions or concerns, please don't hesitate to call         Sincerely,          GARLAND Bee        CC: No Recipients
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
## 2022-10-19 NOTE — TELEPHONE ENCOUNTER
Pt states her SCLE needs to be done 60 days, not 30  Pt was seen with Arnol Salgado today for a phyical   Pt needs and EKG and labs  Can Arnol Salgado put these in and complete the paper the patient has  Kristina's first available appt is 11/01/2022 which is a day to late      call pt [phone]

## 2022-11-21 ENCOUNTER — APPOINTMENT (OUTPATIENT)
Dept: LAB | Facility: CLINIC | Age: 27
End: 2022-11-21

## 2022-11-21 DIAGNOSIS — Z01.818 PREOP TESTING: ICD-10-CM

## 2022-11-21 LAB
APTT PPP: 27 SECONDS (ref 23–37)
BASOPHILS # BLD AUTO: 0.03 THOUSANDS/ÂΜL (ref 0–0.1)
BASOPHILS NFR BLD AUTO: 1 % (ref 0–1)
EOSINOPHIL # BLD AUTO: 0.09 THOUSAND/ÂΜL (ref 0–0.61)
EOSINOPHIL NFR BLD AUTO: 2 % (ref 0–6)
ERYTHROCYTE [DISTWIDTH] IN BLOOD BY AUTOMATED COUNT: 14.8 % (ref 11.6–15.1)
EST. AVERAGE GLUCOSE BLD GHB EST-MCNC: 91 MG/DL
HBA1C MFR BLD: 4.8 %
HCT VFR BLD AUTO: 31.9 % (ref 34.8–46.1)
HGB BLD-MCNC: 11.3 G/DL (ref 11.5–15.4)
IMM GRANULOCYTES # BLD AUTO: 0.03 THOUSAND/UL (ref 0–0.2)
IMM GRANULOCYTES NFR BLD AUTO: 1 % (ref 0–2)
INR PPP: 0.98 (ref 0.84–1.19)
LYMPHOCYTES # BLD AUTO: 2.48 THOUSANDS/ÂΜL (ref 0.6–4.47)
LYMPHOCYTES NFR BLD AUTO: 45 % (ref 14–44)
MCH RBC QN AUTO: 32.8 PG (ref 26.8–34.3)
MCHC RBC AUTO-ENTMCNC: 35.4 G/DL (ref 31.4–37.4)
MCV RBC AUTO: 93 FL (ref 82–98)
MONOCYTES # BLD AUTO: 0.32 THOUSAND/ÂΜL (ref 0.17–1.22)
MONOCYTES NFR BLD AUTO: 6 % (ref 4–12)
NEUTROPHILS # BLD AUTO: 2.42 THOUSANDS/ÂΜL (ref 1.85–7.62)
NEUTS SEG NFR BLD AUTO: 45 % (ref 43–75)
NRBC BLD AUTO-RTO: 0 /100 WBCS
PLATELET # BLD AUTO: 368 THOUSANDS/UL (ref 149–390)
PMV BLD AUTO: 11.5 FL (ref 8.9–12.7)
PROTHROMBIN TIME: 13.1 SECONDS (ref 11.6–14.5)
RBC # BLD AUTO: 3.45 MILLION/UL (ref 3.81–5.12)
WBC # BLD AUTO: 5.37 THOUSAND/UL (ref 4.31–10.16)

## 2022-11-22 ENCOUNTER — CONSULT (OUTPATIENT)
Dept: INTERNAL MEDICINE CLINIC | Facility: CLINIC | Age: 27
End: 2022-11-22

## 2022-11-22 VITALS
TEMPERATURE: 98.7 F | HEART RATE: 88 BPM | OXYGEN SATURATION: 98 % | BODY MASS INDEX: 29.02 KG/M2 | HEIGHT: 65 IN | WEIGHT: 174.2 LBS | SYSTOLIC BLOOD PRESSURE: 100 MMHG | DIASTOLIC BLOOD PRESSURE: 58 MMHG

## 2022-11-22 DIAGNOSIS — D57.3 SICKLE CELL TRAIT (HCC): Chronic | ICD-10-CM

## 2022-11-22 DIAGNOSIS — Z01.818 PRE-OP EVALUATION: Primary | ICD-10-CM

## 2022-11-22 LAB
ALBUMIN SERPL BCP-MCNC: 3.9 G/DL (ref 3.5–5)
ALP SERPL-CCNC: 81 U/L (ref 46–116)
ALT SERPL W P-5'-P-CCNC: 32 U/L (ref 12–78)
ANION GAP SERPL CALCULATED.3IONS-SCNC: 4 MMOL/L (ref 4–13)
AST SERPL W P-5'-P-CCNC: 19 U/L (ref 5–45)
BILIRUB SERPL-MCNC: 0.49 MG/DL (ref 0.2–1)
BUN SERPL-MCNC: 9 MG/DL (ref 5–25)
CALCIUM SERPL-MCNC: 9.6 MG/DL (ref 8.3–10.1)
CHLORIDE SERPL-SCNC: 109 MMOL/L (ref 96–108)
CO2 SERPL-SCNC: 25 MMOL/L (ref 21–32)
CREAT SERPL-MCNC: 0.83 MG/DL (ref 0.6–1.3)
GFR SERPL CREATININE-BSD FRML MDRD: 96 ML/MIN/1.73SQ M
GLUCOSE P FAST SERPL-MCNC: 92 MG/DL (ref 65–99)
POTASSIUM SERPL-SCNC: 3.9 MMOL/L (ref 3.5–5.3)
PROT SERPL-MCNC: 7.9 G/DL (ref 6.4–8.4)
SODIUM SERPL-SCNC: 138 MMOL/L (ref 135–147)
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
## 2022-11-22 NOTE — LETTER
November 22, 2022     Patient: Yohana Rdz  YOB: 1995  Date of Visit: 11/22/2022      To Whom it May Concern:    Yohana Rdz is under my professional care  Nico Cruz was seen in my office on 11/22/2022  Nico Cruz will be out of work from the 12/27/2022 through 12/3/2022  If you have any questions or concerns, please don't hesitate to call           Sincerely,          GARLAND Candelaria        CC: No Recipients

## 2022-11-25 ENCOUNTER — TELEPHONE (OUTPATIENT)
Dept: INTERNAL MEDICINE CLINIC | Facility: CLINIC | Age: 27
End: 2022-11-25

## 2022-11-25 NOTE — TELEPHONE ENCOUNTER
DROP  OFF  FORMS  FOR  NANDO  TO  SIGN   PT  SAID  NANDO  KNOWS ABOUT  THEM   SHE  IS  HAVING   SURGERY  AND   HER  BOYFRIEND  NEEDS  TO  TAKE  TIME OF   WORK  TO  TAKE  CARE  OF  HER  PAPER  WORK  IS  IN [place]

## 2022-11-28 ENCOUNTER — OFFICE VISIT (OUTPATIENT)
Dept: OBGYN CLINIC | Age: 27
End: 2022-11-28

## 2022-11-28 VITALS
DIASTOLIC BLOOD PRESSURE: 64 MMHG | BODY MASS INDEX: 29.12 KG/M2 | SYSTOLIC BLOOD PRESSURE: 110 MMHG | HEIGHT: 65 IN | WEIGHT: 174.8 LBS

## 2022-11-28 DIAGNOSIS — B96.89 BV (BACTERIAL VAGINOSIS): Primary | ICD-10-CM

## 2022-11-28 DIAGNOSIS — B37.31 YEAST INFECTION OF THE VAGINA: ICD-10-CM

## 2022-11-28 DIAGNOSIS — N76.0 BV (BACTERIAL VAGINOSIS): Primary | ICD-10-CM

## 2022-11-28 DIAGNOSIS — N89.8 VAGINAL DISCHARGE: ICD-10-CM

## 2022-11-28 LAB
BV WHIFF TEST VAG QL: POSITIVE
CLUE CELLS SPEC QL WET PREP: POSITIVE
PH SMN: 5 [PH]
T VAGINALIS VAG QL WET PREP: NEGATIVE
YEAST VAG QL WET PREP: POSITIVE

## 2022-11-28 RX ORDER — METRONIDAZOLE 500 MG/1
500 TABLET ORAL EVERY 12 HOURS SCHEDULED
Qty: 14 TABLET | Refills: 0 | Status: SHIPPED | OUTPATIENT
Start: 2022-11-28 | End: 2022-12-05

## 2022-11-28 RX ORDER — FLUCONAZOLE 150 MG/1
150 TABLET ORAL ONCE
Qty: 2 TABLET | Refills: 0 | Status: SHIPPED | OUTPATIENT
Start: 2022-11-28 | End: 2022-11-28

## 2022-11-28 NOTE — PROGRESS NOTES
Frequent UTI's and takes cranberry pills  "Light yellow cream" colored discharge  Burning pain after intercourse that lasts about 45 minutes  This has been ongoing for about a month
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
## 2022-11-28 NOTE — PATIENT INSTRUCTIONS
Perineal Hygiene     No soaps or feminine wash to the vulva  Use only water to cleanse, or water with Dove or [employer] if necessary  No lotion to the area  Use only coconut oil for moisture if needed   No douching     Cotton underware, loose fitting clothing  Only perfume-free, dye-free laundry detergent, use a second rinse cycle   Avoid fabric softeners/dryer sheets  Coconut oil as a lubricant (if not using condoms) or another scent-free lubricant (Astroglide, Uberlube) if needed  Partner to avoid the same products as well  Over the counter probiotic to restore vaginal christel may be helpful as well     You may also look into Boric Acid vaginal suppositories to restore vaginal PH balance for up to 2 weeks as directed on the box  You may not use these if you are pregnant Bacterial Vaginosis   AMBULATORY CARE:   Bacterial vaginosis  is an infection in the vagina  It may cause vaginitis (irritation and inflammation of the vagina)  The cause is not known  Bacteria normally found in the vagina are imbalanced  Your risk increases if you are sexually active, you use a douche, or you have an intrauterine device (IUD)  Common signs and symptoms of bacterial vaginosis:   White, gray, or yellow vaginal discharge    Vaginal discharge that smells like fish    Itching or burning around the outside of your vagina    Call your doctor or gynecologist if:   Your symptoms come back or do not improve with treatment  You have vaginal bleeding that is not your monthly period  You have questions or concerns about your condition or care  Antibiotics  are given to kill the bacteria  They may be given as a pill or as a cream to put in your vagina  Bacterial vaginosis and pregnancy:  If you have bacterial vaginosis during pregnancy, your baby may be born early or have a low birth weight  Your healthcare provider may recommend testing for bacterial vaginosis before or during your pregnancy  He or she will talk to you about your risk for premature delivery, and make sure you know the benefits and risks of testing  Prevent bacterial vaginosis:   Keep your vaginal area clean and dry  Wear underwear and pantyhose with a cotton crotch  Wipe from front to back after you urinate or have a bowel movement  After you bathe, rinse soap from your vaginal area to decrease your risk for irritation  Do not use products that cause irritation  Always use unscented tampons or sanitary pads  Do not use feminine sprays, powders, or scented tampons  They may cause irritation and increase your risk for vaginosis  Detergents and fabric softeners may also cause irritation  Do not use a douche  This can cause an imbalance in healthy vaginal bacteria  Use latex condoms during sex  This helps prevent another infection and keeps your partner from getting the infection  Follow up with your doctor or gynecologist as directed: Bacterial vaginosis increases the risk for several health problems, such as pelvic inflammatory disease (PID) or sexually transmitted infections  Work with your healthcare providers to schedule regular appointments to check for health problems  Write down your questions so you remember to ask them during your visits  © Copyright [employer] 2022 Information is for End User's use only and may not be sold, redistributed or otherwise used for commercial purposes  All illustrations and images included in CareNotes® are the copyrighted property of A D [employer] M [employer] Inc  or [employer]  The above information is an  only  It is not intended as medical advice for individual conditions or treatments  Talk to your doctor, nurse or pharmacist before following any medical regimen to see if it is safe and effective for you  Yeast Infection   AMBULATORY CARE:   A yeast infection , or vaginal candidiasis, is a common vaginal infection   A yeast infection is caused by a fungus, or yeast-like germ  Fungi are normally found in your vagina  Too many fungi can cause an infection  Common signs and symptoms: Thick, white, cheese-like discharge from your vagina    Itching, swelling, and redness in your vagina    Pain or burning when you urinate    Pain during sexual intercourse    Call your doctor or gynecologist if:   You have a fever and chills  You develop abdominal or pelvic pain  Your discharge is bloody and it is not your monthly period  Your signs and symptoms get worse, even after treatment  You have questions or concerns about your condition or care  Treatment for a yeast infection  includes medicines to treat the fungal infection and decrease inflammation  The medicine may be a pill, cream, ointment, or vaginal tablet or suppository  Keep your vagina healthy:   Clean your genital area with mild soap and warm water each day  Do not get soap inside your vagina  Gently dry the area after washing  Do not use hot tubs  The heat and moisture from hot tubs can increase your risk for another yeast infection  Always wipe from front to back  after you use the toilet  This prevents spreading bacteria from your rectal area into your vagina  Do not wear tight-fitting clothes or undergarments  for long periods of time  Wear cotton underwear during the day  Cotton helps keep your genital area dry and does not hold in warmth or moisture  Do not wear underwear at night  Do not douche  or use feminine hygiene sprays or bubble bath  Do not use pads or tampons that are scented, or colored or perfumed toilet paper  Do not have sex until your symptoms go away  Have your partner wear a condom until you complete your course of medication  Ask your healthcare provider about birth control options if necessary  Condoms have latex and diaphragms have gel that kills sperm  Both of these may irritate your genital area      Follow up with your doctor or gynecologist as directed: Write down your questions so you remember to ask them during your visits  © Copyright [employer] 2022 Information is for End User's use only and may not be sold, redistributed or otherwise used for commercial purposes  All illustrations and images included in CareNotes® are the copyrighted property of A D A M [employer] Inc  or [employer]  The above information is an  only  It is not intended as medical advice for individual conditions or treatments  Talk to your doctor, nurse or pharmacist before following any medical regimen to see if it is safe and effective for you

## 2022-11-30 NOTE — TELEPHONE ENCOUNTER
Spoke to patient she will  original  Put the original in reception area [employer]  Form was faxed to [address] [place] [address]  Made a copy to be scanned    $25 00 charge

## 2023-01-24 ENCOUNTER — CLINICAL SUPPORT (OUTPATIENT)
Dept: OBGYN CLINIC | Age: 28
End: 2023-01-24

## 2023-01-24 VITALS
WEIGHT: 174 LBS | SYSTOLIC BLOOD PRESSURE: 112 MMHG | DIASTOLIC BLOOD PRESSURE: 84 MMHG | BODY MASS INDEX: 28.99 KG/M2 | HEIGHT: 65 IN

## 2023-01-24 DIAGNOSIS — Z30.42 SURVEILLANCE FOR DEPO-PROVERA CONTRACEPTION: Primary | ICD-10-CM

## 2023-01-24 DIAGNOSIS — Z32.02 NEGATIVE PREGNANCY TEST: ICD-10-CM

## 2023-01-24 LAB — SL AMB POCT URINE HCG: NORMAL

## 2023-01-24 RX ORDER — MEDROXYPROGESTERONE ACETATE 150 MG/ML
150 INJECTION, SUSPENSION INTRAMUSCULAR ONCE
Status: COMPLETED | OUTPATIENT
Start: 2023-01-24 | End: 2023-01-24

## 2023-01-24 RX ADMIN — MEDROXYPROGESTERONE ACETATE 150 MG: 150 INJECTION, SUSPENSION INTRAMUSCULAR at 11:14

## 2023-01-24 NOTE — PROGRESS NOTES
Pt is here for Depo Provera injection  Her last dose was 10/13/22  Her annual exam was on 07/14/22  Depo given in R Buttock  Tolerated well    Lot [id number] Exp 05/2025  Next dose due 04/11/-04/25/2023  Refill needed yes

## 2023-01-24 NOTE — PATIENT INSTRUCTIONS
Medroxyprogesterone (By injection)   Medroxyprogesterone ([id number]-[id number]-SERGE-ter-one)  Prevents pregnancy  Also treats endometriosis and is used with other medicines to help relieve symptoms of cancer, including uterine or kidney cancer  Brand Name(s): Depo-Provera, Depo-Provera Contraceptive, Depo-SubQ Provera 104, medroxyPROGESTERone acetate Novaplus   There may be other brand names for this medicine  When This Medicine Should Not Be Used: This medicine is not right for everyone  You should not receive it if you had an allergic reaction to medroxyprogesterone or if you have a history of breast cancer or blood clots (including heart attack or stroke)  In most cases, you should not use this medicine while you are pregnant  How to Use This Medicine:   Injectable  A nurse or other health provider will give you this medicine  This medicine is given as a shot into a muscle (usually in the buttocks or upper arm) or just under the skin  Your exact treatment schedule depends on the reason you are using this medicine  You doctor will explain your personal schedule  For treatment of cancer symptoms, you may start with a shot once per week  You may need fewer shots as your treatment goes forward  For birth control or endometriosis, you will need a shot every 3 months (13 weeks)  You might need to have the first shot during the first 5 days of your normal menstrual period, to make sure you are not pregnant  If you have just had a baby, you may receive a shot 5 days after birth if you are not breastfeeding or 6 weeks after birth if you are breastfeeding  Read and follow the patient instructions that come with this medicine  Talk to your doctor or pharmacist if you have any questions  Missed dose: You must receive a shot every 3 months if you want to prevent pregnancy  Talk to your doctor or pharmacist if you do not receive your medicine on time, because you may need another form of birth control    Drugs and Foods to Avoid:   Ask your doctor or pharmacist before using any other medicine, including over-the-counter medicines, vitamins, and herbal products  Some medicines can affect how medroxyprogesterone works  Tell your doctor if you are using any of the following:  Aminoglutethimide, bosentan, carbamazepine, felbamate, griseofulvin, mitotane, modafinil, nefazodone, oxcarbazepine, phenobarbital, phenytoin, rifabutin, rifampin, rifapentine, Johnathan's wort, topiramate  Medicine to treat an infection (including clarithromycin, itraconazole, ketoconazole, telithromycin, voriconazole)  Medicine to treat HIV/AIDS (including atazanavir, efavirenz, indinavir, nelfinavir, ritonavir, saquinavir)  Warnings While Using This Medicine:   Tell your doctor right away if you think you have become pregnant  Tell your doctor if you are breastfeeding, or if you have kidney disease, liver disease, asthma, diabetes, heart disease, seizures, migraine headaches, an eating disorder, osteoporosis, or a history of depression  Tell your doctor if you smoke  This medicine may cause the following problems:  Weak or thin bones, especially with long-term use  Blood clots, which could lead to stroke, heart attack, eye or vision problems, or other serious problems  Possible increased risk of breast cancer  Injection site reactions  Liver problems  Changes in menstrual periods  Fluid retention (edema) and weight gain  You should not use this medicine for long-term birth control unless you cannot use any other form of birth control  This medicine will not protect you from HIV/AIDS or other sexually transmitted diseases  Tell any doctor or dentist who treats you that you are using this medicine  This medicine may affect certain medical test results  Your doctor will do lab tests at regular visits to check on the effects of this medicine  Keep all appointments    Possible Side Effects While Using This Medicine:   Call your doctor right away if you notice any of these side effects: Allergic reaction: Itching or hives, swelling in your face or hands, swelling or tingling in your mouth or throat, chest tightness, trouble breathing  Chest pain, trouble breathing, or coughing up blood  Dark urine or pale stools, nausea, vomiting, loss of appetite, stomach pain, yellow skin or eyes  Heavy or nonstop vaginal bleeding  Loss of vision, double vision  Numbness or weakness on one side of your body, sudden or severe headache, problems with vision, speech, or walking  Rapid weight gain, swelling in your hands, ankles, or feet  Seizures  If you notice these less serious side effects, talk with your doctor:   Light or missed monthly periods, spotting between periods  Nervousness or dizziness  Pain, redness, burning, swelling, or a lump under your skin where the shot was given  If you notice other side effects that you think are caused by this medicine, tell your doctor  Call your doctor for medical advice about side effects  You may report side effects to FDA at 4-305-FDA-8736    © Copyright [employer] 2022 Information is for End User's use only and may not be sold, redistributed or otherwise used for commercial purposes  The above information is an  only  It is not intended as medical advice for individual conditions or treatments  Talk to your doctor, nurse or pharmacist before following any medical regimen to see if it is safe and effective for you

## 2023-04-26 ENCOUNTER — OFFICE VISIT (OUTPATIENT)
Age: 28
End: 2023-04-26

## 2023-04-26 VITALS
BODY MASS INDEX: 27.99 KG/M2 | SYSTOLIC BLOOD PRESSURE: 100 MMHG | OXYGEN SATURATION: 99 % | DIASTOLIC BLOOD PRESSURE: 70 MMHG | HEART RATE: 101 BPM | WEIGHT: 168 LBS | HEIGHT: 65 IN

## 2023-04-26 DIAGNOSIS — R11.0 NAUSEA: ICD-10-CM

## 2023-04-26 DIAGNOSIS — G43.109 MIGRAINE WITH AURA AND WITHOUT STATUS MIGRAINOSUS, NOT INTRACTABLE: Primary | ICD-10-CM

## 2023-04-26 RX ORDER — RIZATRIPTAN BENZOATE 5 MG/1
TABLET ORAL
Qty: 10 TABLET | Refills: 5 | Status: SHIPPED | OUTPATIENT
Start: 2023-04-26

## 2023-04-26 RX ORDER — ONDANSETRON 4 MG/1
4 TABLET, ORALLY DISINTEGRATING ORAL EVERY 6 HOURS PRN
Qty: 10 TABLET | Refills: 0 | Status: SHIPPED | OUTPATIENT
Start: 2023-04-26
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
## 2023-05-09 ENCOUNTER — OFFICE VISIT (OUTPATIENT)
Age: 28
End: 2023-05-09

## 2023-05-09 VITALS — HEIGHT: 65 IN | BODY MASS INDEX: 28.99 KG/M2 | WEIGHT: 174 LBS

## 2023-05-09 DIAGNOSIS — B35.9 TINEA: Primary | ICD-10-CM

## 2023-05-09 DIAGNOSIS — Z11.3 SCREEN FOR STD (SEXUALLY TRANSMITTED DISEASE): ICD-10-CM

## 2023-05-09 DIAGNOSIS — Z30.42 SURVEILLANCE FOR DEPO-PROVERA CONTRACEPTION: ICD-10-CM

## 2023-05-09 RX ORDER — MEDROXYPROGESTERONE ACETATE 150 MG/ML
150 INJECTION, SUSPENSION INTRAMUSCULAR
Qty: 1 ML | Refills: 4 | Status: SHIPPED | OUTPATIENT
Start: 2023-05-09

## 2023-05-09 RX ORDER — NYSTATIN AND TRIAMCINOLONE ACETONIDE 100000; 1 [USP'U]/G; MG/G
OINTMENT TOPICAL 2 TIMES DAILY
Qty: 60 G | Refills: 0 | Status: SHIPPED | OUTPATIENT
Start: 2023-05-09 | End: 2023-05-23

## 2023-05-09 NOTE — PATIENT INSTRUCTIONS
Vaginal Discharge   WHAT YOU NEED TO KNOW:   Vaginal discharge is normal  It is usually clear or white and odorless  Vaginal discharge is your body's way of cleaning your vagina so it is healthy  Irritation, itching, burning, or a change in the amount, smell, or color may indicate a problem  DISCHARGE INSTRUCTIONS:   Contact your healthcare provider or gynecologist if:   You have swelling, burning, itching, or irritation in or around your vagina  You have an increase in the amount of discharge  The color or smell of your discharge changes  Your discharge looks similar to cottage cheese  Your discharge is bloody and it is not your monthly period  You have pain during sexual intercourse  You have trouble urinating, or you urinate often and with urgency  You have abdominal pain or cramps  You have a fever or chills  You have low back pain or side pain  You have questions or concerns about your condition or care  Keep your vagina healthy:   Always wipe from front to back  after you use the toilet  This prevents spreading bacteria from your rectal area into your vagina  Clean in and around your vagina with mild soap and warm water each day  Gently dry the area after washing  Do not use hot tubs  The heat and moisture from hot tubs can increase your risk for another yeast infection  Do not wear tight-fitting clothes or undergarments  for long periods  Wear cotton underwear during the day  Cotton helps keep your genital area dry and does not hold in warmth or moisture  Do not wear underwear at night  Change your laundry soap or fabric softener  if you think it is irritating your skin  Do not douche  or use feminine hygiene sprays or bubble bath  Do not use pads or tampons that are scented, or colored or perfumed toilet paper  Ask your healthcare provider about birth control options if necessary  Condoms have latex and diaphragms have gel that kill sperm   Both of these may irritate your genital area  Follow up with your doctor as directed:  Write down your questions so you remember to ask them during your visits  © Copyright [employer]ui 2022 Information is for End User's use only and may not be sold, redistributed or otherwise used for commercial purposes  The above information is an  only  It is not intended as medical advice for individual conditions or treatments  Talk to your doctor, nurse or pharmacist before following any medical regimen to see if it is safe and effective for you
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
## 2023-05-11 LAB
C TRACH DNA SPEC QL NAA+PROBE: NEGATIVE
N GONORRHOEA DNA SPEC QL NAA+PROBE: NEGATIVE
T VAGINALIS DNA SPEC QL NAA+PROBE: NEGATIVE

## 2023-05-24 ENCOUNTER — TELEPHONE (OUTPATIENT)
Dept: OBGYN CLINIC | Facility: CLINIC | Age: 28
End: 2023-05-24

## 2023-05-24 DIAGNOSIS — N92.6 LATE PERIOD: Primary | ICD-10-CM

## 2023-05-24 NOTE — TELEPHONE ENCOUNTER
"Spoke to patient - she took a home UPT    \"C\" control is dark  \"T\" ( test) nothing but odd faint line in between the 2  LMP- dont really get them- was on depo  Was due 4/15- didn't get it as she is trying to get pregnant now    Last  depo 1/26- advised to get a test that says pregnant or not pregnant- might have been faulty- and to try tomorrow with first morning urine  Keep us posted  Denies any early pregnancy symptoms right now       "

## 2023-05-24 NOTE — TELEPHONE ENCOUNTER
Patient has questions about the home pregnancy test she took - her and her partner are trying for a baby - she had mentioned two lines but not where the lines should be

## 2023-05-30 NOTE — TELEPHONE ENCOUNTER
Patient is having a fluttering feeling in her lower abdomen   Patient never had this before   Patient had called last week regarding a pregnancy test
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
## 2023-05-31 ENCOUNTER — APPOINTMENT (OUTPATIENT)
Age: 28
End: 2023-05-31
Payer: COMMERCIAL

## 2023-05-31 DIAGNOSIS — N92.6 LATE PERIOD: ICD-10-CM

## 2023-05-31 LAB — B-HCG SERPL-ACNC: <1 MIU/ML (ref 0–5)

## 2023-05-31 PROCEDURE — 84702 CHORIONIC GONADOTROPIN TEST: CPT

## 2023-05-31 PROCEDURE — 36415 COLL VENOUS BLD VENIPUNCTURE: CPT

## 2023-07-09 PROCEDURE — 99284 EMERGENCY DEPT VISIT MOD MDM: CPT

## 2023-07-10 ENCOUNTER — HOSPITAL ENCOUNTER (EMERGENCY)
Facility: HOSPITAL | Age: 28
Discharge: HOME | End: 2023-07-10
Attending: EMERGENCY MEDICINE
Payer: COMMERCIAL

## 2023-07-10 ENCOUNTER — APPOINTMENT (EMERGENCY)
Dept: CT IMAGING | Facility: HOSPITAL | Age: 28
End: 2023-07-10
Payer: COMMERCIAL

## 2023-07-10 VITALS
SYSTOLIC BLOOD PRESSURE: 116 MMHG | RESPIRATION RATE: 18 BRPM | HEART RATE: 91 BPM | OXYGEN SATURATION: 99 % | DIASTOLIC BLOOD PRESSURE: 69 MMHG | TEMPERATURE: 98.3 F

## 2023-07-10 DIAGNOSIS — N39.0 UTI (URINARY TRACT INFECTION): Primary | ICD-10-CM

## 2023-07-10 LAB
ALBUMIN SERPL BCP-MCNC: 4.4 G/DL (ref 3.5–5)
ALP SERPL-CCNC: 67 U/L (ref 34–104)
ALT SERPL W P-5'-P-CCNC: 11 U/L (ref 7–52)
ANION GAP SERPL CALCULATED.3IONS-SCNC: 5 MMOL/L
AST SERPL W P-5'-P-CCNC: 14 U/L (ref 13–39)
B-HCG SERPL-ACNC: <1 MIU/ML (ref 0–5)
BACTERIA UR QL AUTO: ABNORMAL /HPF
BASOPHILS # BLD AUTO: 0.06 THOUSANDS/ÂΜL (ref 0–0.1)
BASOPHILS NFR BLD AUTO: 1 % (ref 0–1)
BILIRUB SERPL-MCNC: 0.49 MG/DL (ref 0.2–1)
BILIRUB UR QL STRIP: NEGATIVE
BUN SERPL-MCNC: 10 MG/DL (ref 5–25)
CALCIUM SERPL-MCNC: 9.4 MG/DL (ref 8.4–10.2)
CHLORIDE SERPL-SCNC: 106 MMOL/L (ref 96–108)
CLARITY UR: CLEAR
CO2 SERPL-SCNC: 26 MMOL/L (ref 21–32)
COLOR UR: ABNORMAL
CREAT SERPL-MCNC: 0.77 MG/DL (ref 0.6–1.3)
EOSINOPHIL # BLD AUTO: 0.08 THOUSAND/ÂΜL (ref 0–0.61)
EOSINOPHIL NFR BLD AUTO: 1 % (ref 0–6)
ERYTHROCYTE [DISTWIDTH] IN BLOOD BY AUTOMATED COUNT: 13.2 % (ref 11.6–15.1)
EXT PREGNANCY TEST URINE: NEGATIVE
EXT. CONTROL: NORMAL
GFR SERPL CREATININE-BSD FRML MDRD: 104 ML/MIN/1.73SQ M
GLUCOSE SERPL-MCNC: 103 MG/DL (ref 65–140)
GLUCOSE UR STRIP-MCNC: NEGATIVE MG/DL
HCT VFR BLD AUTO: 38.2 % (ref 34.8–46.1)
HGB BLD-MCNC: 13.1 G/DL (ref 11.5–15.4)
HGB UR QL STRIP.AUTO: ABNORMAL
IMM GRANULOCYTES # BLD AUTO: 0.01 THOUSAND/UL (ref 0–0.2)
IMM GRANULOCYTES NFR BLD AUTO: 0 % (ref 0–2)
KETONES UR STRIP-MCNC: NEGATIVE MG/DL
LEUKOCYTE ESTERASE UR QL STRIP: ABNORMAL
LIPASE SERPL-CCNC: 26 U/L (ref 11–82)
LYMPHOCYTES # BLD AUTO: 2.77 THOUSANDS/ÂΜL (ref 0.6–4.47)
LYMPHOCYTES NFR BLD AUTO: 41 % (ref 14–44)
MCH RBC QN AUTO: 28.9 PG (ref 26.8–34.3)
MCHC RBC AUTO-ENTMCNC: 34.3 G/DL (ref 31.4–37.4)
MCV RBC AUTO: 84 FL (ref 82–98)
MONOCYTES # BLD AUTO: 0.49 THOUSAND/ÂΜL (ref 0.17–1.22)
MONOCYTES NFR BLD AUTO: 7 % (ref 4–12)
NEUTROPHILS # BLD AUTO: 3.41 THOUSANDS/ÂΜL (ref 1.85–7.62)
NEUTS SEG NFR BLD AUTO: 50 % (ref 43–75)
NITRITE UR QL STRIP: NEGATIVE
NON-SQ EPI CELLS URNS QL MICRO: ABNORMAL /HPF
NRBC BLD AUTO-RTO: 0 /100 WBCS
PH UR STRIP.AUTO: 5.5 [PH]
PLATELET # BLD AUTO: 313 THOUSANDS/UL (ref 149–390)
PMV BLD AUTO: 10.4 FL (ref 8.9–12.7)
POTASSIUM SERPL-SCNC: 3.5 MMOL/L (ref 3.5–5.3)
PROT SERPL-MCNC: 7.9 G/DL (ref 6.4–8.4)
PROT UR STRIP-MCNC: ABNORMAL MG/DL
RBC # BLD AUTO: 4.54 MILLION/UL (ref 3.81–5.12)
RBC #/AREA URNS AUTO: ABNORMAL /HPF
SODIUM SERPL-SCNC: 137 MMOL/L (ref 135–147)
SP GR UR STRIP.AUTO: 1.02 (ref 1–1.03)
UROBILINOGEN UR STRIP-ACNC: <2 MG/DL
WBC # BLD AUTO: 6.82 THOUSAND/UL (ref 4.31–10.16)
WBC #/AREA URNS AUTO: ABNORMAL /HPF

## 2023-07-10 PROCEDURE — 81025 URINE PREGNANCY TEST: CPT

## 2023-07-10 PROCEDURE — 85025 COMPLETE CBC W/AUTO DIFF WBC: CPT | Performed by: EMERGENCY MEDICINE

## 2023-07-10 PROCEDURE — 83690 ASSAY OF LIPASE: CPT | Performed by: EMERGENCY MEDICINE

## 2023-07-10 PROCEDURE — 80053 COMPREHEN METABOLIC PANEL: CPT | Performed by: EMERGENCY MEDICINE

## 2023-07-10 PROCEDURE — 36415 COLL VENOUS BLD VENIPUNCTURE: CPT | Performed by: EMERGENCY MEDICINE

## 2023-07-10 PROCEDURE — 81001 URINALYSIS AUTO W/SCOPE: CPT | Performed by: EMERGENCY MEDICINE

## 2023-07-10 PROCEDURE — G1004 CDSM NDSC: HCPCS

## 2023-07-10 PROCEDURE — 84702 CHORIONIC GONADOTROPIN TEST: CPT

## 2023-07-10 PROCEDURE — 96375 TX/PRO/DX INJ NEW DRUG ADDON: CPT

## 2023-07-10 PROCEDURE — 74177 CT ABD & PELVIS W/CONTRAST: CPT

## 2023-07-10 PROCEDURE — 96374 THER/PROPH/DIAG INJ IV PUSH: CPT

## 2023-07-10 RX ORDER — ONDANSETRON 4 MG/1
4 TABLET, FILM COATED ORAL EVERY 6 HOURS
Qty: 12 TABLET | Refills: 0 | Status: SHIPPED | OUTPATIENT
Start: 2023-07-10

## 2023-07-10 RX ORDER — KETOROLAC TROMETHAMINE 30 MG/ML
15 INJECTION, SOLUTION INTRAMUSCULAR; INTRAVENOUS ONCE
Status: COMPLETED | OUTPATIENT
Start: 2023-07-10 | End: 2023-07-10

## 2023-07-10 RX ORDER — CEPHALEXIN 250 MG/1
500 CAPSULE ORAL ONCE
Status: COMPLETED | OUTPATIENT
Start: 2023-07-10 | End: 2023-07-10

## 2023-07-10 RX ORDER — CEPHALEXIN 500 MG/1
500 CAPSULE ORAL EVERY 6 HOURS SCHEDULED
Qty: 28 CAPSULE | Refills: 0 | Status: SHIPPED | OUTPATIENT
Start: 2023-07-10 | End: 2023-07-17

## 2023-07-10 RX ORDER — ACETAMINOPHEN 325 MG/1
650 TABLET ORAL ONCE
Status: COMPLETED | OUTPATIENT
Start: 2023-07-10 | End: 2023-07-10

## 2023-07-10 RX ORDER — ONDANSETRON 2 MG/ML
4 INJECTION INTRAMUSCULAR; INTRAVENOUS ONCE
Status: COMPLETED | OUTPATIENT
Start: 2023-07-10 | End: 2023-07-10

## 2023-07-10 RX ADMIN — ACETAMINOPHEN 650 MG: 325 TABLET, FILM COATED ORAL at 03:54

## 2023-07-10 RX ADMIN — CEPHALEXIN 500 MG: 250 CAPSULE ORAL at 04:36

## 2023-07-10 RX ADMIN — ONDANSETRON 4 MG: 2 INJECTION INTRAMUSCULAR; INTRAVENOUS at 03:54

## 2023-07-10 RX ADMIN — KETOROLAC TROMETHAMINE 15 MG: 30 INJECTION, SOLUTION INTRAMUSCULAR at 03:54

## 2023-07-10 RX ADMIN — IOHEXOL 100 ML: 350 INJECTION, SOLUTION INTRAVENOUS at 03:14

## 2023-07-10 NOTE — Clinical Note
Willa Mittal was seen and treated in our emergency department on 7/9/2023. Diagnosis:     Janessa Christianson  . She may return on this date: If you have any questions or concerns, please don't hesitate to call.       Kaia Galvez MD    ______________________________           _______________          _______________  Hospital Representative                              Date                                Time

## 2023-07-10 NOTE — Clinical Note
Alva Salamanca was seen and treated in our emergency department on 7/9/2023. Diagnosis: UTI    Nkyia  . She may return on this date: 07/11/2023    Please excuse Edwar Lopes from her absence from work on Monday, July 10, 2023. She was in the ED being evaluated late into the morning of July 10, 2023 for acute abdominal pain and nausea. She can return to work on 7/11/2023. If you have any questions or concerns, please don't hesitate to call.       Braulio Aj PA-C    ______________________________           _______________          _______________  Hospital Representative                              Date                                Time
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
## 2023-07-10 NOTE — Clinical Note
Khloe Mejia was seen and treated in our emergency department on 7/9/2023. Diagnosis:     Sridhar Meza  . She may return on this date: 07/11/2023    Please excuse Sridhar Meza from her absence from work on Monday, July 10, 2023. She was in the ED being evaluated late into the morning of July 10, 2023. She can return to work on 7/11/2023. If you have any questions or concerns, please don't hesitate to call.       Sandra Allen PA-C    ______________________________           _______________          _______________  Hospital Representative                              Date                                Time

## 2023-07-10 NOTE — DISCHARGE INSTRUCTIONS
Today I provided you with a prescription for Keflex. Cephalexin is an antibiotic used to treat urinary tract infections. Also provided you with a prescription for Zofran. Zofran is a medication that can help reduce nausea symptoms. Follow-up with your primary care provider if symptoms persist despite treatment with Keflex.

## 2023-08-16 ENCOUNTER — OFFICE VISIT (OUTPATIENT)
Dept: OBGYN CLINIC | Facility: CLINIC | Age: 28
End: 2023-08-16
Payer: COMMERCIAL

## 2023-08-16 ENCOUNTER — APPOINTMENT (OUTPATIENT)
Dept: LAB | Facility: HOSPITAL | Age: 28
End: 2023-08-16
Payer: COMMERCIAL

## 2023-08-16 VITALS
WEIGHT: 171.4 LBS | HEIGHT: 65 IN | BODY MASS INDEX: 28.56 KG/M2 | DIASTOLIC BLOOD PRESSURE: 72 MMHG | SYSTOLIC BLOOD PRESSURE: 110 MMHG

## 2023-08-16 DIAGNOSIS — N76.0 BV (BACTERIAL VAGINOSIS): Primary | ICD-10-CM

## 2023-08-16 DIAGNOSIS — B96.89 BV (BACTERIAL VAGINOSIS): Primary | ICD-10-CM

## 2023-08-16 DIAGNOSIS — N91.2 AMENORRHEA: ICD-10-CM

## 2023-08-16 LAB
B-HCG SERPL-ACNC: <1 MIU/ML (ref 0–5)
BV WHIFF TEST VAG QL: ABNORMAL
CLUE CELLS SPEC QL WET PREP: ABNORMAL
PH SMN: 5 [PH]
SL AMB POCT URINE HCG: NORMAL
T VAGINALIS VAG QL WET PREP: ABNORMAL
YEAST VAG QL WET PREP: ABNORMAL

## 2023-08-16 PROCEDURE — 36415 COLL VENOUS BLD VENIPUNCTURE: CPT

## 2023-08-16 PROCEDURE — 99213 OFFICE O/P EST LOW 20 MIN: CPT | Performed by: OBSTETRICS & GYNECOLOGY

## 2023-08-16 PROCEDURE — 87210 SMEAR WET MOUNT SALINE/INK: CPT | Performed by: OBSTETRICS & GYNECOLOGY

## 2023-08-16 PROCEDURE — 84702 CHORIONIC GONADOTROPIN TEST: CPT

## 2023-08-16 PROCEDURE — 81025 URINE PREGNANCY TEST: CPT | Performed by: OBSTETRICS & GYNECOLOGY

## 2023-08-16 RX ORDER — METRONIDAZOLE 500 MG/1
500 TABLET ORAL EVERY 12 HOURS SCHEDULED
Qty: 14 TABLET | Refills: 0 | Status: SHIPPED | OUTPATIENT
Start: 2023-08-16 | End: 2023-08-23

## 2023-08-16 RX ORDER — FLUCONAZOLE 150 MG/1
TABLET ORAL
Qty: 2 TABLET | Refills: 0 | Status: SHIPPED | OUTPATIENT
Start: 2023-08-23 | End: 2023-08-26

## 2023-08-16 NOTE — PATIENT INSTRUCTIONS
Perineal Hygiene      Your vaginal naturally takes care of its self, it is a self washing system, the less you mess the healthier it will be     No soaps or feminine wash to the vulva, these products can cause dermitis, bacterial infections and other vulvar problems. Use only water to cleanse, or water with Dove or [employer] if necessary. No scented lotions or products are advised in or near your vulva. Use only coconut oil for moisture if needed. No douching this may cause imbalance in your vaginal PH and further issues. If you wear panty liners, you may apply a thin coating of Vaseline, A&D ointment or coconut oil to the vulvar tissues as a skin barrier     Cotton underware, loose fitting clothing  Only perfume-free, dye-free laundry detergent, use a second rinse cycle   Avoid fabric softeners/dryer sheets. Partner should avoid the same products as well. Over the counter probiotic to restore vaginal christel may be helpful as well, take daily. You may also look into Boric Acid vaginal suppositories to restore vaginal PH balance for up to 2 weeks as directed on the box. You may not use these if you are pregnant      For vaginal dryness: You may use:     Coconut oil (organic, pure, unscented) as needed for moisture or lubrication. ( Do not use if allergic)       Replens moisture restore external comfort gel daily ( use as directed on the box)        Replens long lasting vaginal moisturizer  ( use as directed on the box)         For Vaginal Lubrication:          You may use:     Coconut oil (organic, pure, unscented) as a lubricant or another scent-free lubricant (Astroglide, Uberlube) if needed.   Do not use coconut oil or silicone if using a condom as this may break down the integrity of the condom and cause an unplanned pregnancy              Do not use coconut oil if allergic               Replens silky smooth lubricant, premium silicone based lubricant for intercourse. ( use as directed, a small amount will provide an enhanced natural feeling)     Any premium over the counter vaginal lubricant water or silicone based. Silicone based will have more staying power. Amenorrhea   AMBULATORY CARE:   Amenorrhea  is the absence of menstruation (your monthly period). Primary amenorrhea means your period did not start by age 12. This is usually because of a lack of reproductive organs, such as a uterus. Breasts and other signs of puberty that usually start to develop by age 15 do not develop. Secondary amenorrhea means you stopped having regular periods for at least 3 months or irregular periods for 6 months. Amenorrhea may be a sign of a serious medical problem that needs to be treated. Common symptoms include the following:   Hair growth on your face or over your breastbone, or bald patches    Acne    Pelvic pain    Headaches    Vision changes    Bruises or patches of dark skin    Call your doctor or gynecologist for any of the following: You notice changes in your menstrual cycle, such as periods that start and stop a few times. Your female child is 15, has not started menstruating, and is not developing signs of puberty. Your female child is 12 and has developed signs of puberty, but she has not started menstruating. You have questions or concerns about your condition or care. Treatment for amenorrhea  may include birth control pills to restart and regulate your periods. You may need medicines to treat medical conditions such as a thyroid or pituitary disorder, or PCOS. Surgery may fix a problem that is preventing blood from flowing through your vagina, or to remove a tumor. Prevent or manage amenorrhea:   Maintain a healthy weight. Low body weight, overweight, or obesity can all affect your period. Your healthcare provider can help you create a plan to reach and maintain a healthy weight. Eat healthy foods.   Healthy foods include fruits, vegetables, whole-grain breads, low-fat dairy products, beans, lean meats, and fish. You may need to have more calcium and vitamin D if your amenorrhea is caused by low estrogen levels. Low estrogen can affect bone strength. Calcium and vitamin D work together to help build bone. Your healthcare provider or a dietitian can help you create a meal plan that has the right number of calories and nutrients for you. Exercise as directed. Exercise can help you build or maintain bone. Exercise can also help you lose weight if you are overweight. Ask your healthcare provider how much to exercise and which exercises are best for you. Do not exercise more than your healthcare provider recommends. Too much exercise can cause your period to stop. Keep a record of your monthly periods. Record the dates your period started and stopped. Also record any pain or other problems during your period. Manage stress. Try new ways to relax, such as deep breathing. Ask your healthcare provider for more information on stress management. Follow up with your doctor or gynecologist as directed:  Write down your questions so you remember to ask them during your visits. © Copyright [employer] 2022 Information is for End User's use only and may not be sold, redistributed or otherwise used for commercial purposes. The above information is an  only. It is not intended as medical advice for individual conditions or treatments. Talk to your doctor, nurse or pharmacist before following any medical regimen to see if it is safe and effective for you. Bacterial Vaginosis   AMBULATORY CARE:   Bacterial vaginosis  is an infection in the vagina. It may cause vaginitis (irritation and inflammation of the vagina). The cause is not known. Bacteria normally found in the vagina are imbalanced. Your risk increases if you are sexually active, you use a douche, or you have an intrauterine device (IUD).        Common signs and symptoms of bacterial vaginosis:   White, gray, or yellow vaginal discharge    Vaginal discharge that smells like fish    Itching or burning around the outside of your vagina    Call your doctor or gynecologist if:   Your symptoms come back or do not improve with treatment. You have vaginal bleeding that is not your monthly period. You have questions or concerns about your condition or care. Antibiotics  are given to kill the bacteria. They may be given as a pill or as a cream to put in your vagina. Bacterial vaginosis and pregnancy:  If you have bacterial vaginosis during pregnancy, your baby may be born early or have a low birth weight. Your healthcare provider may recommend testing for bacterial vaginosis before or during your pregnancy. He or she will talk to you about your risk for premature delivery, and make sure you know the benefits and risks of testing. Prevent bacterial vaginosis:   Keep your vaginal area clean and dry. Wear underwear and pantyhose with a cotton crotch. Wipe from front to back after you urinate or have a bowel movement. After you bathe, rinse soap from your vaginal area to decrease your risk for irritation. Do not use products that cause irritation. Always use unscented tampons or sanitary pads. Do not use feminine sprays, powders, or scented tampons. They may cause irritation and increase your risk for vaginosis. Detergents and fabric softeners may also cause irritation. Do not use a douche. This can cause an imbalance in healthy vaginal bacteria. Use latex condoms during sex. This helps prevent another infection and keeps your partner from getting the infection. Follow up with your doctor or gynecologist as directed: Bacterial vaginosis increases the risk for several health problems, such as pelvic inflammatory disease (PID) or sexually transmitted infections. Work with your healthcare providers to schedule regular appointments to check for health problems. Write down your questions so you remember to ask them during your visits. © Copyright [employer] 2022 Information is for End User's use only and may not be sold, redistributed or otherwise used for commercial purposes. The above information is an  only. It is not intended as medical advice for individual conditions or treatments. Talk to your doctor, nurse or pharmacist before following any medical regimen to see if it is safe and effective for you. Natural Family Planning   AMBULATORY CARE:   Natural family planning  is a way to prevent or plan a pregnancy. Natural family planning helps you understand your body's rhythms to learn when you are most likely to get pregnant each month. Contact your healthcare provider if:   You have questions or concerns about natural family planning. Natural family planning methods: The symptothermal method  means you will chart your daily body temperature. You will need to buy a basal body thermometer at the store. Take your basal body temperature before you get out of bed in the morning. Keep track of each reading. Your basal body temperature will increase when you ovulate. You are more likely to get pregnant when it is higher. The cervical mucus discharge method  means you will check your cervical mucus each day. Discharge is clear, slippery, and stretchy when you ovulate. The amount is also higher than usual. Discharge is thick, sticky, and cloudy when you are not ovulating. Check your cervical mucus each day to learn when you ovulate. The calendar, or rhythm, method  means you will count the number of days between your periods. This will tell you when you ovulate. This method can work if the number of days in your cycle is the same each month. If you have irregular monthly periods, the natural family planning method is not as accurate.     What else you need to know about natural family planning:   Natural family planning does not decrease your risk for a sexually transmitted infection (STI). The success of natural family planning depends on how well you measure your menstrual cycle. You will need to learn the exact timing of your cycle and follow it closely. Your menstrual cycle can be affected by any of the following:     Changes in exercise habits    Caffeine or unhealthy foods    Stress, depression, and anxiety    Health problems or illness    Increasing age    Weight gain or loss    Risks of not following natural family planning correctly: You may become pregnant when you do not want to. It may be hard to know when you are fertile. Follow up with your doctor as directed:  Write down your questions so you remember to ask them during your visits. © Copyright Leanne Odom 2022 Information is for End User's use only and may not be sold, redistributed or otherwise used for commercial purposes. The above information is an  only. It is not intended as medical advice for individual conditions or treatments. Talk to your doctor, nurse or pharmacist before following any medical regimen to see if it is safe and effective for you.
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
## 2023-10-05 ENCOUNTER — TELEPHONE (OUTPATIENT)
Dept: OBGYN CLINIC | Facility: CLINIC | Age: 28
End: 2023-10-05

## 2023-10-05 NOTE — TELEPHONE ENCOUNTER
Returned pt's p.c.-  Pt states she & partner are trying to conceive. Has had irreg menses since discontinuing depo in January. Her LMP was approx. 8/25/23, did not have a period in September. She took 2 pregnancy tests, 9/29/23, 10/4, both were negative. Advised pt repeat UPT this weekend. , as she may have taken the test too early. Advised to call with results.   (If positive, can schedule dating US.)

## 2023-10-05 NOTE — TELEPHONE ENCOUNTER
Pt is calling today with concerns about her irregular cycle. Pt is trying to conceive and stopped the depo provera in January. Pt has been experiencing irregular cycles since then and no cycle in September at all. Pt has taken a pregnancy test and is neg at this time. Her current symptoms include nausea and soreness in her breast.  Pt would like to know what she should do moving forward? Please call to discuss.

## 2023-10-23 ENCOUNTER — OFFICE VISIT (OUTPATIENT)
Age: 28
End: 2023-10-23
Payer: COMMERCIAL

## 2023-10-23 VITALS
BODY MASS INDEX: 28.49 KG/M2 | DIASTOLIC BLOOD PRESSURE: 72 MMHG | TEMPERATURE: 98 F | SYSTOLIC BLOOD PRESSURE: 116 MMHG | HEIGHT: 65 IN | WEIGHT: 171 LBS

## 2023-10-23 DIAGNOSIS — G43.E09 CHRONIC MIGRAINE WITH AURA WITHOUT STATUS MIGRAINOSUS, NOT INTRACTABLE: Primary | ICD-10-CM

## 2023-10-23 PROCEDURE — 99213 OFFICE O/P EST LOW 20 MIN: CPT

## 2023-10-23 RX ORDER — RIZATRIPTAN BENZOATE 5 MG/1
5 TABLET ORAL ONCE AS NEEDED
Qty: 10 TABLET | Refills: 5 | Status: SHIPPED | OUTPATIENT
Start: 2023-10-23

## 2023-10-23 RX ORDER — ONDANSETRON 4 MG/1
4 TABLET, FILM COATED ORAL EVERY 8 HOURS PRN
Qty: 20 TABLET | Refills: 0 | Status: SHIPPED | OUTPATIENT
Start: 2023-10-23
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
## 2023-10-27 ENCOUNTER — TELEPHONE (OUTPATIENT)
Age: 28
End: 2023-10-27

## 2023-10-27 NOTE — TELEPHONE ENCOUNTER
Patient called and said fiorecet is not covered and needs alternative sent, she went to ER last night did CT and it was normal.

## 2023-11-07 ENCOUNTER — TELEPHONE (OUTPATIENT)
Dept: NEUROLOGY | Facility: CLINIC | Age: 28
End: 2023-11-07

## 2023-11-14 NOTE — TELEPHONE ENCOUNTER
2nd attempt to reach patient from triage. No answer. Left message on machine. Message sent via [address] [place] [address].

## 2024-01-16 ENCOUNTER — OFFICE VISIT (OUTPATIENT)
Age: 29
End: 2024-01-16
Payer: COMMERCIAL

## 2024-01-16 VITALS — DIASTOLIC BLOOD PRESSURE: 78 MMHG | SYSTOLIC BLOOD PRESSURE: 102 MMHG | BODY MASS INDEX: 27.79 KG/M2 | WEIGHT: 167 LBS

## 2024-01-16 DIAGNOSIS — Z32.02 PREGNANCY TEST NEGATIVE: Primary | ICD-10-CM

## 2024-01-16 PROBLEM — Z30.42 SURVEILLANCE FOR DEPO-PROVERA CONTRACEPTION: Status: RESOLVED | Noted: 2020-09-24 | Resolved: 2024-01-16

## 2024-01-16 LAB — SL AMB POCT URINE HCG: NEGATIVE

## 2024-01-16 PROCEDURE — 99213 OFFICE O/P EST LOW 20 MIN: CPT | Performed by: STUDENT IN AN ORGANIZED HEALTH CARE EDUCATION/TRAINING PROGRAM

## 2024-01-16 PROCEDURE — 81025 URINE PREGNANCY TEST: CPT | Performed by: STUDENT IN AN ORGANIZED HEALTH CARE EDUCATION/TRAINING PROGRAM

## 2024-01-16 NOTE — PROGRESS NOTES
Assessment/Plan:       Problem List Items Addressed This Visit    None  Visit Diagnoses       Pregnancy test negative    -  Primary    Relevant Orders    POCT urine HCG (Completed)          - Recommend upt today in case of spotting in early pregnancy, negative  - Reassured. Reviewed reproductive anatomy, ovulation window, optimization of intercourse  - recommended continued nicotine abstinence and PNV use    Subjective:      Patient ID: Luís Han is a 28 y.o. female.    HPI  She presents today for discussion and education regarding family planning. She was using depo provera for contraception until January 2023. She was amenorrheic until June 2023, after which cycles were irregular. Starting in October however, cycles became very regular. On Jan 9th, she had a faintly positive UPT, after which she experienced brown spotting x 3 days on 1/11/24, which was her expected LMP.     She is very interested in pregnancy. She has not started a PNV, but did quit vaping yesterday.     The following portions of the patient's history were reviewed and updated as appropriate: allergies, current medications, past family history, past medical history, past social history, past surgical history, and problem list.    Review of Systems  as above    Objective:  /78 (BP Location: Left arm, Patient Position: Sitting, Cuff Size: Standard)   Wt 75.8 kg (167 lb)   LMP 01/11/2024 (Exact Date)   BMI 27.79 kg/m²      Physical Exam  Vitals reviewed.   Constitutional:       Appearance: She is well-developed.   HENT:      Head: Normocephalic.   Cardiovascular:      Rate and Rhythm: Normal rate.   Pulmonary:      Effort: Pulmonary effort is normal.   Musculoskeletal:         General: Normal range of motion.      Cervical back: Normal range of motion.   Neurological:      Mental Status: She is alert and oriented to person, place, and time.   Psychiatric:         Behavior: Behavior normal.

## 2024-02-28 ENCOUNTER — OFFICE VISIT (OUTPATIENT)
Dept: OBGYN CLINIC | Facility: CLINIC | Age: 29
End: 2024-02-28
Payer: COMMERCIAL

## 2024-02-28 VITALS
HEIGHT: 65 IN | WEIGHT: 163 LBS | DIASTOLIC BLOOD PRESSURE: 80 MMHG | BODY MASS INDEX: 27.16 KG/M2 | SYSTOLIC BLOOD PRESSURE: 122 MMHG

## 2024-02-28 DIAGNOSIS — N93.0 POSTCOITAL BLEEDING: Primary | ICD-10-CM

## 2024-02-28 DIAGNOSIS — Z11.3 SCREEN FOR STD (SEXUALLY TRANSMITTED DISEASE): ICD-10-CM

## 2024-02-28 PROCEDURE — 87491 CHLMYD TRACH DNA AMP PROBE: CPT | Performed by: NURSE PRACTITIONER

## 2024-02-28 PROCEDURE — 87591 N.GONORRHOEAE DNA AMP PROB: CPT | Performed by: NURSE PRACTITIONER

## 2024-02-28 PROCEDURE — 99213 OFFICE O/P EST LOW 20 MIN: CPT | Performed by: NURSE PRACTITIONER
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
## 2024-02-28 NOTE — PATIENT INSTRUCTIONS
Planning for Pregnancy   AMBULATORY CARE:   Why you should plan for pregnancy:  You can help get your body ready for a healthy pregnancy. A healthy pregnancy can improve your ability to have a healthy baby. The steps you need to take and the amount of time needed depends on your current health and habits. Work with your healthcare provider to help you plan a healthy pregnancy.  What you need to know about nutrition and exercise before pregnancy:   Eat a variety of healthy foods.  Healthy foods include fruits, vegetables, whole-grain breads, low-fat dairy foods, beans, lean meats, and fish. Limit foods high in sugar, fat, and sodium. Limit your intake of fish to 2 servings each week. Choose fish low in mercury such as canned light tuna, shrimp, salmon, cod, or tilapia. Do not  eat fish high in mercury such as swordfish, tilefish, aranza mackerel, and shark.         Take 400 micrograms (mcg) of folic acid each day.  This will help to prevent birth defects of the brain and spine such as spina bifida. Most women should take folic acid before pregnancy and up to 12 weeks after getting pregnant.         Exercise for at least 30 minutes, 5 days a week.  Some examples of exercise include walking, biking, dancing, and swimming. Include muscle strengthening activities 2 days each week. Regular exercise provides many health benefits. It helps you manage your weight, and decreases your risk for type 2 diabetes, heart disease, stroke, and high blood pressure. Exercise can also help improve your mood. Ask your healthcare provider about the best exercise plan for you.       How weight affects pregnancy:   Obesity  can make it harder for you to get pregnant. It also increases your risk of health problems during pregnancy. Some of these health problems include gestational diabetes, high blood pressure, and infections. It can also increase your baby's risk of health problems such as birth defects. Your baby may also be large and harder  to deliver or be born prematurely (early). Your risk of miscarriage is also higher if you are obese. Work with your healthcare provider to reach a healthy weight before you try to get pregnant.    Being underweight  can also make it hard for you to get pregnant. It can also increase your risk of having a premature baby and miscarriage. Your baby may be born at a low birth weight.    What you need to know about smoking, alcohol, and drugs:   Smoking  increases your risk of a miscarriage and other health problems during pregnancy. Smoking can cause your baby to be born too early or weigh less at birth. Ask your healthcare provider for information if you need help quitting.    Alcohol  passes from your body to your baby through the placenta. It can affect your baby's brain development and cause fetal alcohol syndrome (FAS). FAS is a group of conditions that causes mental, behavior, and growth problems. Talk to your healthcare provider if you abuse alcohol and need help quitting before pregnancy.    Drugs , such as marijuana and cocaine, should not be used while you are trying to get pregnant or during pregnancy. They increase your risk of problems during pregnancy and increase the risk of having a baby with health problems. These include birth defects, premature birth, and infant death.    What you need to know about medicines and supplements:  Tell your healthcare provider about all the medicines and supplements you take. Certain medicines and supplements should not be used during pregnancy. These include over-the-counter medicines, prescription medicines, vitamins, and herbal supplements. He or she may recommend that you take different medicines that are safer during pregnancy.  What you need to know about immunizations:  Tell your healthcare provider about all the immunizations you have had. If you have missed any immunizations, your healthcare provider may recommend that you update your immunizations. These include  hepatitis B, influenza, MMR (measles, mumps, rubella), Tdap, and varicella immunizations. You should get 1 dose of the Tdap vaccine with each pregnancy. It is best to get the vaccine at 27 to 36 weeks of pregnancy.  Tests you may need before pregnancy:  Your healthcare provider may recommend that you have tests to screen for sexually transmitted infections. These include chlamydia, gonorrhea, herpes, HIV infection, syphilis, and tuberculosis. These infectious diseases should be treated before pregnancy, if needed.  What you need to know about toxic substances:  Toxic substances can harm a developing baby. Examples include cleaning products, paints, solvents, pesticides, and other chemical products. They can increase the risk of having a miscarriage, premature birth, and low-birth weight baby. They also increase the risk of developmental delay and childhood cancer. Avoid exposure to toxic substances and materials at work and home.  What you need to know about genetic testing:  Tell your healthcare provider about genetic disorders, developmental delays, or other disabilities. Include your family and your partner's family. Also tell your provider about any problems in past pregnancies. Your provider may recommend that you see a healthcare professional called a genetic counselor. He or she will talk to you about how genes, birth defects, and other medical conditions are passed down. He or she can also tell you about your risk for passing a genetic disease in a future pregnancy. A screening test may include blood tests to check your DNA or your partner's DNA. Genetic tests are not always accurate or complete. Your baby may be born with a genetic disorder that did not show up in the tests. Talk to your healthcare provider about any concerns you have with genetic testing.  How you can prepare for pregnancy if you have a medical condition:  Medical conditions such as diabetes, high blood pressure, asthma, seizure disorders,  and thyroid disorders should be managed before pregnancy. Mental health conditions, such as depression and anxiety, should also be treated. This will decrease your risk of having health problems during pregnancy. It will also decrease your baby's risk for medical problems. Medicines used to treat certain conditions are not safe to use during pregnancy and may need to be changed before you get pregnant. Ask your healthcare provider if it is safe for you to get pregnant if you have a medical condition.  Follow up with your doctor or obstetrician as directed:  Write down your questions so you remember to ask them during your visits.  © Copyright [employer] 2023 Information is for End User's use only and may not be sold, redistributed or otherwise used for commercial purposes.  The above information is an  only. It is not intended as medical advice for individual conditions or treatments. Talk to your doctor, nurse or pharmacist before following any medical regimen to see if it is safe and effective for you.

## 2024-03-02 LAB
C TRACH DNA SPEC QL NAA+PROBE: NEGATIVE
N GONORRHOEA DNA SPEC QL NAA+PROBE: NEGATIVE

## 2024-03-05 ENCOUNTER — ANNUAL EXAM (OUTPATIENT)
Age: 29
End: 2024-03-05
Payer: COMMERCIAL

## 2024-03-05 VITALS
WEIGHT: 164 LBS | BODY MASS INDEX: 27.32 KG/M2 | SYSTOLIC BLOOD PRESSURE: 110 MMHG | HEIGHT: 65 IN | DIASTOLIC BLOOD PRESSURE: 78 MMHG

## 2024-03-05 DIAGNOSIS — N93.0 POSTCOITAL BLEEDING: ICD-10-CM

## 2024-03-05 DIAGNOSIS — Z01.419 ENCOUNTER FOR GYNECOLOGICAL EXAMINATION WITHOUT ABNORMAL FINDING: Primary | ICD-10-CM

## 2024-03-05 PROCEDURE — 99395 PREV VISIT EST AGE 18-39: CPT | Performed by: NURSE PRACTITIONER

## 2024-03-05 PROCEDURE — G0145 SCR C/V CYTO,THINLAYER,RESCR: HCPCS | Performed by: NURSE PRACTITIONER

## 2024-03-05 PROCEDURE — G0476 HPV COMBO ASSAY CA SCREEN: HCPCS | Performed by: NURSE PRACTITIONER
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
## 2024-03-05 NOTE — PATIENT INSTRUCTIONS
Breast Self Exam for Women   AMBULATORY CARE:   A breast self-exam (BSE)  is a way to check your breasts for lumps and other changes. Regular BSEs can help you know how your breasts normally look and feel. Most breast lumps or changes are not cancer, but you should always have them checked by a healthcare provider.  Why you should do a BSE:  Breast cancer is the most common type of cancer in women. Even if you have mammograms, you may still want to do a BSE regularly. If you know how your breasts normally feel and look, it may help you know when to contact your healthcare provider. Mammograms can miss some cancers. You may find a lump during a BSE that did not show up on a mammogram.  When you should do a BSE:  If you have periods, you may want to do your BSE 1 week after your period ends. This is the time when your breasts may be the least swollen, lumpy, or tender. You can do regular BSEs even if you are breastfeeding or have breast implants.  Call your doctor if:   You find any lumps or changes in your breasts.    You have breast pain or fluid coming from your nipples.    You have questions or concerns about your condition or care.    How to do a BSE:       Look at your breasts in a mirror.  Look at the size and shape of each breast and nipple. Check for swelling, lumps, dimpling, scaly skin, or other skin changes. Look for nipple changes, such as a nipple that is painful or beginning to pull inward. Gently squeeze both nipples and check to see if fluid (that is not breast milk) comes out of them. If you find any of these or other breast changes, contact your healthcare provider. Check your breasts while you sit or  the following 3 positions:    Hang your arms down at your sides.    Raise your hands and join them behind your head.    Put firm pressure with your hands on your hips. Bend slightly forward while you look at your breasts in the mirror.    Lie down and feel your breasts.  When you lie down,  your breast tissue spreads out evenly over your chest. This makes it easier for you to feel for lumps and anything that may not be normal for your breasts. Do a BSE on one breast at a time.    Place a small pillow or towel under your left shoulder.  Put your left arm behind your head.    Use the 3 middle fingers of your right hand.  Use your fingertip pads, on the top of your fingers. Your fingertip pad is the most sensitive part of your finger.    Use small circles to feel your breast tissue.  Use your fingertip pads to make dime-sized, overlapping circles on your breast and armpits. Use light, medium, and firm pressure. First, press lightly. Second, press with medium pressure to feel a little deeper into the breast. Last, use firm pressure to feel deep within your breast.    Examine your entire breast area.  Examine the breast area from above the breast to below the breast where you feel only ribs. Make small circles with your fingertips, starting in the middle of your armpit. Make circles going up and down the breast area. Continue toward your breast and all the way across it. Examine the area from your armpit all the way over to the middle of your chest (breastbone). Stop at the middle of your chest.    Move the pillow or towel to your right shoulder, and put your right arm behind your head.  Use the 3 fingertip pads of your left hand, and repeat the above steps to do a BSE on your right breast.  What else you can do to check for breast problems or cancer:  Talk to your healthcare provider about mammograms. A mammogram is an x-ray of your breasts to screen for breast cancer or other problems. Your provider can tell you the benefits and risks of mammograms. The first mammogram is usually at age 45 or 50. Your provider may recommend you start at 40 or younger if your risk for breast cancer is high. Mammograms usually continue every 1 to 2 years until age 74.       Follow up with your doctor as directed:  Write  down your questions so you remember to ask them during your visits.  © Copyright [employer] 2023 Information is for End User's use only and may not be sold, redistributed or otherwise used for commercial purposes.  The above information is an  only. It is not intended as medical advice for individual conditions or treatments. Talk to your doctor, nurse or pharmacist before following any medical regimen to see if it is safe and effective for you.  Planning for Pregnancy   AMBULATORY CARE:   Why you should plan for pregnancy:  You can help get your body ready for a healthy pregnancy. A healthy pregnancy can improve your ability to have a healthy baby. The steps you need to take and the amount of time needed depends on your current health and habits. Work with your healthcare provider to help you plan a healthy pregnancy.  What you need to know about nutrition and exercise before pregnancy:   Eat a variety of healthy foods.  Healthy foods include fruits, vegetables, whole-grain breads, low-fat dairy foods, beans, lean meats, and fish. Limit foods high in sugar, fat, and sodium. Limit your intake of fish to 2 servings each week. Choose fish low in mercury such as canned light tuna, shrimp, salmon, cod, or tilapia. Do not  eat fish high in mercury such as swordfish, tilefish, aranza mackerel, and shark.         Take 400 micrograms (mcg) of folic acid each day.  This will help to prevent birth defects of the brain and spine such as spina bifida. Most women should take folic acid before pregnancy and up to 12 weeks after getting pregnant.         Exercise for at least 30 minutes, 5 days a week.  Some examples of exercise include walking, biking, dancing, and swimming. Include muscle strengthening activities 2 days each week. Regular exercise provides many health benefits. It helps you manage your weight, and decreases your risk for type 2 diabetes, heart disease, stroke, and high blood pressure. Exercise can  also help improve your mood. Ask your healthcare provider about the best exercise plan for you.       How weight affects pregnancy:   Obesity  can make it harder for you to get pregnant. It also increases your risk of health problems during pregnancy. Some of these health problems include gestational diabetes, high blood pressure, and infections. It can also increase your baby's risk of health problems such as birth defects. Your baby may also be large and harder to deliver or be born prematurely (early). Your risk of miscarriage is also higher if you are obese. Work with your healthcare provider to reach a healthy weight before you try to get pregnant.    Being underweight  can also make it hard for you to get pregnant. It can also increase your risk of having a premature baby and miscarriage. Your baby may be born at a low birth weight.    What you need to know about smoking, alcohol, and drugs:   Smoking  increases your risk of a miscarriage and other health problems during pregnancy. Smoking can cause your baby to be born too early or weigh less at birth. Ask your healthcare provider for information if you need help quitting.    Alcohol  passes from your body to your baby through the placenta. It can affect your baby's brain development and cause fetal alcohol syndrome (FAS). FAS is a group of conditions that causes mental, behavior, and growth problems. Talk to your healthcare provider if you abuse alcohol and need help quitting before pregnancy.    Drugs , such as marijuana and cocaine, should not be used while you are trying to get pregnant or during pregnancy. They increase your risk of problems during pregnancy and increase the risk of having a baby with health problems. These include birth defects, premature birth, and infant death.    What you need to know about medicines and supplements:  Tell your healthcare provider about all the medicines and supplements you take. Certain medicines and supplements  should not be used during pregnancy. These include over-the-counter medicines, prescription medicines, vitamins, and herbal supplements. He or she may recommend that you take different medicines that are safer during pregnancy.  What you need to know about immunizations:  Tell your healthcare provider about all the immunizations you have had. If you have missed any immunizations, your healthcare provider may recommend that you update your immunizations. These include hepatitis B, influenza, MMR (measles, mumps, rubella), Tdap, and varicella immunizations. You should get 1 dose of the Tdap vaccine with each pregnancy. It is best to get the vaccine at 27 to 36 weeks of pregnancy.  Tests you may need before pregnancy:  Your healthcare provider may recommend that you have tests to screen for sexually transmitted infections. These include chlamydia, gonorrhea, herpes, HIV infection, syphilis, and tuberculosis. These infectious diseases should be treated before pregnancy, if needed.  What you need to know about toxic substances:  Toxic substances can harm a developing baby. Examples include cleaning products, paints, solvents, pesticides, and other chemical products. They can increase the risk of having a miscarriage, premature birth, and low-birth weight baby. They also increase the risk of developmental delay and childhood cancer. Avoid exposure to toxic substances and materials at work and home.  What you need to know about genetic testing:  Tell your healthcare provider about genetic disorders, developmental delays, or other disabilities. Include your family and your partner's family. Also tell your provider about any problems in past pregnancies. Your provider may recommend that you see a healthcare professional called a genetic counselor. He or she will talk to you about how genes, birth defects, and other medical conditions are passed down. He or she can also tell you about your risk for passing a genetic disease  in a future pregnancy. A screening test may include blood tests to check your DNA or your partner's DNA. Genetic tests are not always accurate or complete. Your baby may be born with a genetic disorder that did not show up in the tests. Talk to your healthcare provider about any concerns you have with genetic testing.  How you can prepare for pregnancy if you have a medical condition:  Medical conditions such as diabetes, high blood pressure, asthma, seizure disorders, and thyroid disorders should be managed before pregnancy. Mental health conditions, such as depression and anxiety, should also be treated. This will decrease your risk of having health problems during pregnancy. It will also decrease your baby's risk for medical problems. Medicines used to treat certain conditions are not safe to use during pregnancy and may need to be changed before you get pregnant. Ask your healthcare provider if it is safe for you to get pregnant if you have a medical condition.  Follow up with your doctor or obstetrician as directed:  Write down your questions so you remember to ask them during your visits.  © Copyright [employer] 2023 Information is for End User's use only and may not be sold, redistributed or otherwise used for commercial purposes.  The above information is an  only. It is not intended as medical advice for individual conditions or treatments. Talk to your doctor, nurse or pharmacist before following any medical regimen to see if it is safe and effective for you.

## 2024-03-06 LAB
HPV HR 12 DNA CVX QL NAA+PROBE: NEGATIVE
HPV16 DNA CVX QL NAA+PROBE: NEGATIVE
HPV18 DNA CVX QL NAA+PROBE: NEGATIVE

## 2024-03-07 ENCOUNTER — TELEPHONE (OUTPATIENT)
Age: 29
End: 2024-03-07

## 2024-03-07 ENCOUNTER — HOSPITAL ENCOUNTER (OUTPATIENT)
Dept: ULTRASOUND IMAGING | Facility: CLINIC | Age: 29
Discharge: HOME | End: 2024-03-07

## 2024-03-07 DIAGNOSIS — N93.0 POSTCOITAL BLEEDING: ICD-10-CM

## 2024-03-07 NOTE — TELEPHONE ENCOUNTER
Pt called in stating that she had a pelvic US done today and per [facility] it was normal. She questioned why the report said she had a corpus luteum cyst. Reviewed what a corpus luteum cyst is and pt verbalized understanding and is thankful.

## 2024-03-11 LAB
LAB AP GYN PRIMARY INTERPRETATION: NORMAL
Lab: NORMAL

## 2024-03-13 ENCOUNTER — ANTICOAG VISIT (OUTPATIENT)
Age: 29
End: 2024-03-13

## 2024-03-28 DIAGNOSIS — G43.E09 CHRONIC MIGRAINE WITH AURA WITHOUT STATUS MIGRAINOSUS, NOT INTRACTABLE: ICD-10-CM

## 2024-03-28 RX ORDER — RIZATRIPTAN BENZOATE 5 MG/1
5 TABLET ORAL ONCE AS NEEDED
Qty: 10 TABLET | Refills: 0 | Status: SHIPPED | OUTPATIENT
Start: 2024-03-28

## 2024-04-18 ENCOUNTER — OFFICE VISIT (OUTPATIENT)
Age: 29
End: 2024-04-18
Payer: COMMERCIAL

## 2024-04-18 VITALS
RESPIRATION RATE: 18 BRPM | OXYGEN SATURATION: 98 % | TEMPERATURE: 97.5 F | HEART RATE: 93 BPM | WEIGHT: 169.8 LBS | SYSTOLIC BLOOD PRESSURE: 108 MMHG | DIASTOLIC BLOOD PRESSURE: 66 MMHG | BODY MASS INDEX: 28.26 KG/M2

## 2024-04-18 DIAGNOSIS — G43.E09 CHRONIC MIGRAINE WITH AURA WITHOUT STATUS MIGRAINOSUS, NOT INTRACTABLE: ICD-10-CM

## 2024-04-18 DIAGNOSIS — Z00.00 ANNUAL PHYSICAL EXAM: Primary | ICD-10-CM

## 2024-04-18 DIAGNOSIS — Z11.1 SCREENING-PULMONARY TB: ICD-10-CM

## 2024-04-18 PROBLEM — G43.E01 CHRONIC MIGRAINE WITH AURA AND WITH STATUS MIGRAINOSUS, NOT INTRACTABLE: Status: ACTIVE | Noted: 2024-04-18

## 2024-04-18 PROCEDURE — 99395 PREV VISIT EST AGE 18-39: CPT

## 2024-04-18 RX ORDER — RIZATRIPTAN BENZOATE 5 MG/1
5 TABLET ORAL ONCE AS NEEDED
Qty: 30 TABLET | Refills: 0 | Status: SHIPPED | OUTPATIENT
Start: 2024-04-18

## 2024-04-18 NOTE — ASSESSMENT & PLAN NOTE
Continue use of Exedrin and rizatriptan. CT head in October 2023 was unremarkable. Referral placed to neurology for further evaluation and treatment. Recommend the patient go to the ER if she experiences severe, unremitting headache or N/V or changes in vision.
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
## 2024-04-19 ENCOUNTER — APPOINTMENT (OUTPATIENT)
Age: 29
End: 2024-04-19
Payer: COMMERCIAL

## 2024-04-19 DIAGNOSIS — Z11.1 SCREENING-PULMONARY TB: ICD-10-CM

## 2024-04-19 DIAGNOSIS — Z00.00 ANNUAL PHYSICAL EXAM: ICD-10-CM

## 2024-04-19 LAB
ALBUMIN SERPL BCP-MCNC: 4.2 G/DL (ref 3.5–5)
ALP SERPL-CCNC: 58 U/L (ref 34–104)
ALT SERPL W P-5'-P-CCNC: 11 U/L (ref 7–52)
ANION GAP SERPL CALCULATED.3IONS-SCNC: 8 MMOL/L (ref 4–13)
AST SERPL W P-5'-P-CCNC: 15 U/L (ref 13–39)
BASOPHILS # BLD AUTO: 0.05 THOUSANDS/ÂΜL (ref 0–0.1)
BASOPHILS NFR BLD AUTO: 1 % (ref 0–1)
BILIRUB SERPL-MCNC: 0.49 MG/DL (ref 0.2–1)
BUN SERPL-MCNC: 11 MG/DL (ref 5–25)
CALCIUM SERPL-MCNC: 9.5 MG/DL (ref 8.4–10.2)
CHLORIDE SERPL-SCNC: 104 MMOL/L (ref 96–108)
CHOLEST SERPL-MCNC: 161 MG/DL
CO2 SERPL-SCNC: 27 MMOL/L (ref 21–32)
CREAT SERPL-MCNC: 0.74 MG/DL (ref 0.6–1.3)
EOSINOPHIL # BLD AUTO: 0.08 THOUSAND/ÂΜL (ref 0–0.61)
EOSINOPHIL NFR BLD AUTO: 2 % (ref 0–6)
ERYTHROCYTE [DISTWIDTH] IN BLOOD BY AUTOMATED COUNT: 13.2 % (ref 11.6–15.1)
GFR SERPL CREATININE-BSD FRML MDRD: 109 ML/MIN/1.73SQ M
GLUCOSE P FAST SERPL-MCNC: 77 MG/DL (ref 65–99)
HCT VFR BLD AUTO: 35.5 % (ref 34.8–46.1)
HDLC SERPL-MCNC: 56 MG/DL
HGB BLD-MCNC: 11.7 G/DL (ref 11.5–15.4)
IMM GRANULOCYTES # BLD AUTO: 0.01 THOUSAND/UL (ref 0–0.2)
IMM GRANULOCYTES NFR BLD AUTO: 0 % (ref 0–2)
LDLC SERPL CALC-MCNC: 97 MG/DL (ref 0–100)
LYMPHOCYTES # BLD AUTO: 1.81 THOUSANDS/ÂΜL (ref 0.6–4.47)
LYMPHOCYTES NFR BLD AUTO: 40 % (ref 14–44)
MCH RBC QN AUTO: 28.5 PG (ref 26.8–34.3)
MCHC RBC AUTO-ENTMCNC: 33 G/DL (ref 31.4–37.4)
MCV RBC AUTO: 87 FL (ref 82–98)
MONOCYTES # BLD AUTO: 0.32 THOUSAND/ÂΜL (ref 0.17–1.22)
MONOCYTES NFR BLD AUTO: 7 % (ref 4–12)
NEUTROPHILS # BLD AUTO: 2.31 THOUSANDS/ÂΜL (ref 1.85–7.62)
NEUTS SEG NFR BLD AUTO: 50 % (ref 43–75)
NONHDLC SERPL-MCNC: 105 MG/DL
NRBC BLD AUTO-RTO: 0 /100 WBCS
PLATELET # BLD AUTO: 302 THOUSANDS/UL (ref 149–390)
PMV BLD AUTO: 11.3 FL (ref 8.9–12.7)
POTASSIUM SERPL-SCNC: 4.3 MMOL/L (ref 3.5–5.3)
PROT SERPL-MCNC: 7.3 G/DL (ref 6.4–8.4)
RBC # BLD AUTO: 4.1 MILLION/UL (ref 3.81–5.12)
SODIUM SERPL-SCNC: 139 MMOL/L (ref 135–147)
TRIGL SERPL-MCNC: 39 MG/DL
TSH SERPL DL<=0.05 MIU/L-ACNC: 1.07 UIU/ML (ref 0.45–4.5)
WBC # BLD AUTO: 4.58 THOUSAND/UL (ref 4.31–10.16)

## 2024-04-19 PROCEDURE — 80053 COMPREHEN METABOLIC PANEL: CPT

## 2024-04-19 PROCEDURE — 36415 COLL VENOUS BLD VENIPUNCTURE: CPT

## 2024-04-19 PROCEDURE — 84443 ASSAY THYROID STIM HORMONE: CPT

## 2024-04-19 PROCEDURE — 86480 TB TEST CELL IMMUN MEASURE: CPT

## 2024-04-19 PROCEDURE — 80061 LIPID PANEL: CPT

## 2024-04-19 PROCEDURE — 85025 COMPLETE CBC W/AUTO DIFF WBC: CPT

## 2024-04-21 ENCOUNTER — OFFICE VISIT (OUTPATIENT)
Age: 29
End: 2024-04-21
Payer: COMMERCIAL

## 2024-04-21 VITALS
TEMPERATURE: 97.3 F | DIASTOLIC BLOOD PRESSURE: 61 MMHG | OXYGEN SATURATION: 99 % | RESPIRATION RATE: 18 BRPM | SYSTOLIC BLOOD PRESSURE: 110 MMHG | BODY MASS INDEX: 27.76 KG/M2 | HEART RATE: 94 BPM | WEIGHT: 166.8 LBS

## 2024-04-21 DIAGNOSIS — R68.89 FLU-LIKE SYMPTOMS: Primary | ICD-10-CM

## 2024-04-21 DIAGNOSIS — J02.9 SORE THROAT: ICD-10-CM

## 2024-04-21 LAB — S PYO AG THROAT QL: NEGATIVE

## 2024-04-21 PROCEDURE — S9088 SERVICES PROVIDED IN URGENT: HCPCS | Performed by: PHYSICIAN ASSISTANT

## 2024-04-21 PROCEDURE — 87880 STREP A ASSAY W/OPTIC: CPT | Performed by: PHYSICIAN ASSISTANT

## 2024-04-21 PROCEDURE — 87636 SARSCOV2 & INF A&B AMP PRB: CPT | Performed by: PHYSICIAN ASSISTANT

## 2024-04-21 PROCEDURE — 99213 OFFICE O/P EST LOW 20 MIN: CPT | Performed by: PHYSICIAN ASSISTANT

## 2024-04-21 PROCEDURE — 87070 CULTURE OTHR SPECIMN AEROBIC: CPT | Performed by: PHYSICIAN ASSISTANT

## 2024-04-21 RX ORDER — METHYLPREDNISOLONE 4 MG/1
TABLET ORAL
Qty: 1 EACH | Refills: 0 | Status: SHIPPED | OUTPATIENT
Start: 2024-04-21

## 2024-04-21 RX ORDER — OSELTAMIVIR PHOSPHATE 75 MG/1
75 CAPSULE ORAL 2 TIMES DAILY
Qty: 10 CAPSULE | Refills: 0 | Status: SHIPPED | OUTPATIENT
Start: 2024-04-21 | End: 2024-04-26

## 2024-04-21 NOTE — LETTER
April 21, 2024     Patient: Luís Han   YOB: 1995   Date of Visit: 4/21/2024       To Whom It May Concern:    It is my medical opinion that Luís Han may return to work on 4/23/24 .    If you have any questions or concerns, please don't hesitate to call.         Sincerely,        Richard Boogie PA-C    CC: No Recipients

## 2024-04-21 NOTE — LETTER
April 21, 2024     Patient: Luís Han   YOB: 1995   Date of Visit: 4/21/2024       To Whom It May Concern:    It is my medical opinion that Luís Han may return to work on 4/23/24. Theron Escalante brought her and was present with her for this visit today.    If you have any questions or concerns, please don't hesitate to call.         Sincerely,        Richard Boogie PA-C    CC: No Recipients
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
## 2024-04-22 ENCOUNTER — TELEPHONE (OUTPATIENT)
Age: 29
End: 2024-04-22

## 2024-04-22 LAB
FLUAV RNA RESP QL NAA+PROBE: NEGATIVE
FLUBV RNA RESP QL NAA+PROBE: NEGATIVE
GAMMA INTERFERON BACKGROUND BLD IA-ACNC: 0.06 IU/ML
M TB IFN-G BLD-IMP: NEGATIVE
M TB IFN-G CD4+ BCKGRND COR BLD-ACNC: 0.01 IU/ML
M TB IFN-G CD4+ BCKGRND COR BLD-ACNC: 0.03 IU/ML
MITOGEN IGNF BCKGRD COR BLD-ACNC: 9.94 IU/ML
SARS-COV-2 RNA RESP QL NAA+PROBE: NEGATIVE

## 2024-04-22 NOTE — TELEPHONE ENCOUNTER
----- Message from Sun Sung PA-C sent at 4/22/2024  8:48 AM EDT -----  Blood work is all within the normal range.  Recommend follow-up in 6 months as scheduled.

## 2024-04-22 NOTE — TELEPHONE ENCOUNTER
Patient was called today and she was informed that her test for influenza/COVID returned negative and that she may discontinue with Tamiflu at this time.  Encourage patient to follow-up with her primary care provider if his symptoms worsen or persist.  Patient expressed verbal understanding.

## 2024-04-23 LAB — BACTERIA THROAT CULT: NORMAL

## 2024-06-20 DIAGNOSIS — Z30.013 ENCOUNTER FOR INITIAL PRESCRIPTION OF INJECTABLE CONTRACEPTIVE: Primary | ICD-10-CM

## 2024-06-20 RX ORDER — MEDROXYPROGESTERONE ACETATE 150 MG/ML
150 INJECTION, SUSPENSION INTRAMUSCULAR
Qty: 1 ML | Refills: 0 | Status: SHIPPED | OUTPATIENT
Start: 2024-06-20

## 2024-06-24 ENCOUNTER — CLINICAL SUPPORT (OUTPATIENT)
Dept: OBGYN CLINIC | Facility: CLINIC | Age: 29
End: 2024-06-24
Payer: COMMERCIAL

## 2024-06-24 DIAGNOSIS — Z30.013 ENCOUNTER FOR INITIAL PRESCRIPTION OF INJECTABLE CONTRACEPTIVE: Primary | ICD-10-CM

## 2024-06-24 PROCEDURE — 96372 THER/PROPH/DIAG INJ SC/IM: CPT

## 2024-06-24 RX ORDER — MEDROXYPROGESTERONE ACETATE 150 MG/ML
150 INJECTION, SUSPENSION INTRAMUSCULAR ONCE
Status: COMPLETED | OUTPATIENT
Start: 2024-06-24 | End: 2024-06-24

## 2024-06-24 RX ADMIN — MEDROXYPROGESTERONE ACETATE 150 MG: 150 INJECTION, SUSPENSION INTRAMUSCULAR at 16:31

## 2024-06-24 NOTE — PROGRESS NOTES
Pt is here for Depo Provera injection. This is her first dose since restarting  Her annual exam was on 3/5/24.  Urine pregnancy test done: yes   Result: neg  Depo given in R buttock  Tolerated well.  Lot [id number] Exp 03/31/2026  Next dose due 9/9-9/23.   Refill needed yes, 0 left  LMP 6/7/24  Sexually active about 1.5 weeks ago.

## 2024-07-22 ENCOUNTER — TELEPHONE (OUTPATIENT)
Age: 29
End: 2024-07-22

## 2024-07-24 NOTE — TELEPHONE ENCOUNTER
Spoke to pt form was completed.  Faxed to [facility] [phone].  Made a copy to scan, put the original in reception area for .  N/c

## 2024-09-10 ENCOUNTER — NURSE TRIAGE (OUTPATIENT)
Age: 29
End: 2024-09-10

## 2024-09-10 DIAGNOSIS — N89.8 VAGINAL DISCHARGE: Primary | ICD-10-CM

## 2024-09-10 RX ORDER — METRONIDAZOLE 500 MG/1
500 TABLET ORAL 2 TIMES DAILY
Qty: 14 TABLET | Refills: 0 | Status: SHIPPED | OUTPATIENT
Start: 2024-09-10 | End: 2024-09-17

## 2024-09-10 NOTE — TELEPHONE ENCOUNTER
"Patient states she has gotten BV in past summers, has vaginal itching and irritation. Cloudy white and yellowish tinged with clear discharge that not yeast per patient.  Patient states thin light odor,  not foul odor. No bleeding, no fever, no urinary symptoms. Used Flagyl in the past for same symptoms. Patient states she does not like the vaginal insert mediation, wants pills prescribed. Confirmed pharmacy. Reviewed how to take medications .  If patient has history of BV in prior two years, prescribe:    -Metrogel Intravaginally x 5 nights, with no refills.    If patient refuses the intravaginal medication and is requesting a PO medication, prescribe:    -Flagyl 500mg BID PO x 7 days, with no refills    Medication instructions:  Please instruct patient that they cannot drink alcohol while on Flagyl.    Please instruct patient that they should not have sex while on treatment or 3 days after if using Metrogel.    If patient is having recurrent BV infections, please schedule appointment (should be seen within 5 days).    Patient ordered Flagyl per protocol. Reviewed to call back any fever, change in discharge, UTI symptoms or if symptoms do not clear after treatment or any concerns.Patient verbalized understanding of all instructions.     Reason for Disposition   Symptoms of a yeast infection (i.e., itchy, white discharge, not bad smelling) and feels like prior vaginal yeast infections    Answer Assessment - Initial Assessment Questions  1. SYMPTOM: \"What's the main symptom you're concerned about?\" (e.g., pain, itching, dryness)      Itching and irritation  2. LOCATION: \"Where is symptoms located?\" (e.g., inside/outside, left/right)      vaginal  3. ONSET: \"When did the  symptoms  start?\"      8/31   4. PAIN: \"Is there any pain?\" If Yes, ask: \"How bad is it?\" (Scale: 1-10; mild, moderate, severe)      denies  5. ITCHING: \"Is there any itching?\" If Yes, ask: \"How bad is it?\" (Scale: 1-10; mild, moderate, severe)      " "yes  6. CAUSE: \"What do you think is causing the discharge?\" \"Have you had the same problem before? What happened then?\"      BV  7. OTHER SYMPTOMS: \"Do you have any other symptoms?\" (e.g., fever, itching, vaginal bleeding, pain with urination, injury to genital area, vaginal foreign body)      None   8. PREGNANCY: \"Is there any chance you are pregnant?\" \"When was your last menstrual period?\"      Depo, LMP May    Protocols used: Vaginal Symptoms-ADULT-[place]    "

## 2024-09-24 ENCOUNTER — OFFICE VISIT (OUTPATIENT)
Age: 29
End: 2024-09-24
Payer: COMMERCIAL

## 2024-09-24 VITALS
RESPIRATION RATE: 18 BRPM | HEART RATE: 101 BPM | BODY MASS INDEX: 27.62 KG/M2 | SYSTOLIC BLOOD PRESSURE: 109 MMHG | WEIGHT: 166 LBS | OXYGEN SATURATION: 98 % | DIASTOLIC BLOOD PRESSURE: 57 MMHG | TEMPERATURE: 97.9 F

## 2024-09-24 DIAGNOSIS — R07.89 ATYPICAL CHEST PAIN: Primary | ICD-10-CM

## 2024-09-24 PROCEDURE — S9088 SERVICES PROVIDED IN URGENT: HCPCS | Performed by: PHYSICIAN ASSISTANT

## 2024-09-24 PROCEDURE — 99214 OFFICE O/P EST MOD 30 MIN: CPT | Performed by: PHYSICIAN ASSISTANT

## 2024-09-24 RX ORDER — MAGNESIUM HYDROXIDE/ALUMINUM HYDROXICE/SIMETHICONE 120; 1200; 1200 MG/30ML; MG/30ML; MG/30ML
30 SUSPENSION ORAL ONCE
Status: COMPLETED | OUTPATIENT
Start: 2024-09-24 | End: 2024-09-24

## 2024-09-24 RX ADMIN — MAGNESIUM HYDROXIDE/ALUMINUM HYDROXICE/SIMETHICONE 30 ML: 120; 1200; 1200 SUSPENSION ORAL at 09:01
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
## 2024-10-18 ENCOUNTER — OFFICE VISIT (OUTPATIENT)
Age: 29
End: 2024-10-18
Payer: COMMERCIAL

## 2024-10-18 VITALS
RESPIRATION RATE: 14 BRPM | WEIGHT: 174.6 LBS | OXYGEN SATURATION: 98 % | HEART RATE: 101 BPM | HEIGHT: 65 IN | BODY MASS INDEX: 29.09 KG/M2 | SYSTOLIC BLOOD PRESSURE: 120 MMHG | TEMPERATURE: 97.6 F | DIASTOLIC BLOOD PRESSURE: 70 MMHG

## 2024-10-18 DIAGNOSIS — B35.3 TINEA PEDIS OF BOTH FEET: ICD-10-CM

## 2024-10-18 DIAGNOSIS — M25.561 CHRONIC PAIN OF BOTH KNEES: ICD-10-CM

## 2024-10-18 DIAGNOSIS — G43.E09 CHRONIC MIGRAINE WITH AURA WITHOUT STATUS MIGRAINOSUS, NOT INTRACTABLE: Primary | ICD-10-CM

## 2024-10-18 DIAGNOSIS — D57.3 SICKLE CELL TRAIT (HCC): Chronic | ICD-10-CM

## 2024-10-18 DIAGNOSIS — M25.562 CHRONIC PAIN OF BOTH KNEES: ICD-10-CM

## 2024-10-18 DIAGNOSIS — G89.29 CHRONIC PAIN OF BOTH KNEES: ICD-10-CM

## 2024-10-18 PROCEDURE — 99214 OFFICE O/P EST MOD 30 MIN: CPT

## 2024-10-18 RX ORDER — CLOTRIMAZOLE 1 %
CREAM (GRAM) TOPICAL 2 TIMES DAILY
Qty: 28 G | Refills: 1 | Status: SHIPPED | OUTPATIENT
Start: 2024-10-18

## 2024-10-18 RX ORDER — RIZATRIPTAN BENZOATE 10 MG/1
10 TABLET ORAL ONCE AS NEEDED
Qty: 30 TABLET | Refills: 2 | Status: SHIPPED | OUTPATIENT
Start: 2024-10-18

## 2024-10-18 NOTE — PROGRESS NOTES
Ambulatory Visit  Name: Luís Han      : 1995      MRN: [id number]  Encounter Provider: Sun Sung PA-C  Encounter Date: 10/18/2024   Encounter department: [facility] [place]    Assessment & Plan  Chronic migraine with aura without status migrainosus, not intractable  Increased dose of rizatriptan from 5 to 10 mg.  Instructed patient to take 110 mg tablet at the onset of a migraine and she may take a second dose after 2 hours if still symptomatic.  Recommend follow-up with neurology if symptoms worsen or fail to improve.  Orders:    rizatriptan (MAXALT) 10 mg tablet; Take 1 tablet (10 mg total) by mouth once as needed for migraine for up to 90 doses may repeat in 2 hours if necessary    Ambulatory Referral to Neurology; Future    Tinea pedis of both feet  Recommend she try applying clotrimazole cream to the feet 2 times a day for up to 4 weeks.  If no improvement, recommend she follow-up with us and I can place referral to podiatry for further evaluation and management.  Orders:    clotrimazole (LOTRIMIN) 1 % cream; Apply topically 2 (two) times a day    Chronic pain of both knees  No injury or trauma.  Recommend follow-up with physical therapy.  Return to the office if symptoms worsen or fail to improve.  She may continue to take over-the-counter pain medications such Excedrin or ibuprofen or Tylenol, as long as she is not exceeding the maximum doses of ibuprofen or acetaminophen from all sources combined.  She may continue to use ice or heat as needed for pain relief.  Orders:    Ambulatory Referral to Physical Therapy; Future    Sickle cell trait (HCC)  Stable.          History of Present Illness     Roni is a 28-year-old female presenting to the office for routine follow-up and with complaint of bilateral knee pain and swelling for the past few months, dryness and scaling of her feet, and migraines.  She denies any injury or trauma to the knees, but states that they have been  "causing pain and she has noticed swelling towards the end of the day.  She has taken Excedrin for pain which she also uses for her migraine headache pain.  Her feet have been dry and peeling, especially between the toes and on the bottom of the feet, for several months.  She has tried over-the-counter antifungal creams with minimal improvement.  She does not remember the specific names of the creams tried.  She states she tried 2 different creams.  She has been having migraines almost every day recently.  She will take 1 rizatriptan 5 mg at the onset of a migraine, but frequently it does not provide much improvement.  About 2 hours after taking rizatriptan she will take Excedrin or Tylenol, which provides some relief.  I provided a neurology referral at her last office visit and she received a call from them, but there was a long wait, so she never followed up.            Review of Systems   Constitutional:  Negative for chills and fever.   HENT:  Negative for congestion, ear pain and sore throat.    Eyes:  Negative for pain and visual disturbance.   Respiratory:  Negative for cough and shortness of breath.    Cardiovascular:  Negative for chest pain and palpitations.   Gastrointestinal:  Negative for abdominal pain, constipation, diarrhea and vomiting.   Genitourinary:  Negative for dysuria and hematuria.   Musculoskeletal:  Positive for joint swelling (knees) and myalgias (Bilateral knee pain). Negative for arthralgias and back pain.   Skin:  Negative for color change and rash.        Dry and peeling skin of feet   Neurological:  Positive for headaches (migraines). Negative for dizziness, seizures and syncope.   All other systems reviewed and are negative.          Objective     /70 (BP Location: Left arm, Patient Position: Sitting, Cuff Size: Standard)   Pulse 101   Temp 97.6 °F (36.4 °C) (Tympanic)   Resp 14   Ht 5' 5\" (1.651 m)   Wt 79.2 kg (174 lb 9.6 oz)   SpO2 98%   BMI 29.05 kg/m²     Physical " Exam  Vitals and nursing note reviewed.   Constitutional:       General: She is not in acute distress.     Appearance: She is well-developed.   HENT:      Head: Normocephalic and atraumatic.      Right Ear: Tympanic membrane normal.      Left Ear: Tympanic membrane normal.      Nose: Nose normal.      Mouth/Throat:      Mouth: Mucous membranes are moist.      Pharynx: Oropharynx is clear. No oropharyngeal exudate.   Eyes:      Extraocular Movements: Extraocular movements intact.      Conjunctiva/sclera: Conjunctivae normal.   Cardiovascular:      Rate and Rhythm: Normal rate and regular rhythm.      Heart sounds: Normal heart sounds. No murmur heard.     No friction rub. No gallop.   Pulmonary:      Effort: Pulmonary effort is normal. No respiratory distress.      Breath sounds: Normal breath sounds. No wheezing, rhonchi or rales.   Abdominal:      Palpations: Abdomen is soft.      Tenderness: There is no abdominal tenderness.   Musculoskeletal:         General: No swelling.      Cervical back: Neck supple.      Right knee: Swelling present. Normal range of motion. No tenderness. No ACL laxity or PCL laxity.      Instability Tests: Anterior drawer test negative. Posterior drawer test negative.      Left knee: Swelling present. Normal range of motion. Tenderness present. No ACL laxity or PCL laxity.     Instability Tests: Anterior drawer test negative. Posterior drawer test negative.      Comments: Mild swelling of the suprapatellar region of the bilateral knees.   Mild tenderness to palpation of the suprapatellar region of the left knee. No tenderness to palpation of the right knee.   Full ROM of b/l knees.   Feet:      Right foot:      Skin integrity: Skin breakdown and dry skin present. No ulcer, blister, callus or fissure.      Toenail Condition: Right toenails are normal.      Left foot:      Skin integrity: Skin breakdown and dry skin present. No ulcer, blister, callus or fissure.      Toenail Condition: Left  toenails are normal.      Comments: Mild peeling and dryness of the bilateral feet, especially of the skin in between the toes.   Skin:     General: Skin is warm and dry.      Capillary Refill: Capillary refill takes less than 2 seconds.   Neurological:      General: No focal deficit present.      Mental Status: She is alert.   Psychiatric:         Mood and Affect: Mood normal.

## 2024-10-18 NOTE — ASSESSMENT & PLAN NOTE
Increased dose of rizatriptan from 5 to 10 mg.  Instructed patient to take 110 mg tablet at the onset of a migraine and she may take a second dose after 2 hours if still symptomatic.  Recommend follow-up with neurology if symptoms worsen or fail to improve.  Orders:    rizatriptan (MAXALT) 10 mg tablet; Take 1 tablet (10 mg total) by mouth once as needed for migraine for up to 90 doses may repeat in 2 hours if necessary    Ambulatory Referral to Neurology; Future

## 2024-10-18 NOTE — LETTER
October 18, 2024     Patient: Luís Han  YOB: 1995  Date of Visit: 10/18/2024      To Whom it May Concern:    Luís Han is under my professional care. Luís was seen in my office on 10/18/2024. Luís may return to work on 10/19/2024 .    If you have any questions or concerns, please don't hesitate to call.         Sincerely,          Sun Sung PA-C        CC: No Recipients

## 2024-10-22 ENCOUNTER — TELEPHONE (OUTPATIENT)
Age: 29
End: 2024-10-22

## 2024-10-22 NOTE — TELEPHONE ENCOUNTER
Add - On     Pt was recommended to be scheduled in 1-3 weeks and was offered closest appt to the recommendation.

## 2024-10-24 ENCOUNTER — OFFICE VISIT (OUTPATIENT)
Dept: NEUROLOGY | Facility: CLINIC | Age: 29
End: 2024-10-24
Payer: COMMERCIAL

## 2024-10-24 VITALS
TEMPERATURE: 98.3 F | WEIGHT: 175.4 LBS | OXYGEN SATURATION: 98 % | DIASTOLIC BLOOD PRESSURE: 68 MMHG | SYSTOLIC BLOOD PRESSURE: 114 MMHG | HEART RATE: 98 BPM | BODY MASS INDEX: 29.19 KG/M2

## 2024-10-24 DIAGNOSIS — G43.E09 CHRONIC MIGRAINE WITH AURA WITHOUT STATUS MIGRAINOSUS, NOT INTRACTABLE: ICD-10-CM

## 2024-10-24 DIAGNOSIS — G43.709 CHRONIC MIGRAINE WITHOUT AURA WITHOUT STATUS MIGRAINOSUS, NOT INTRACTABLE: Primary | ICD-10-CM

## 2024-10-24 DIAGNOSIS — R51.9 CHRONIC DAILY HEADACHE: ICD-10-CM

## 2024-10-24 DIAGNOSIS — Z82.49 FAMILY HISTORY OF CEREBRAL ANEURYSM: ICD-10-CM

## 2024-10-24 PROCEDURE — 99205 OFFICE O/P NEW HI 60 MIN: CPT

## 2024-10-24 RX ORDER — METOCLOPRAMIDE 10 MG/1
10 TABLET ORAL EVERY 6 HOURS PRN
Qty: 20 TABLET | Refills: 1 | Status: SHIPPED | OUTPATIENT
Start: 2024-10-24

## 2024-10-24 RX ORDER — SUMATRIPTAN 25 MG/1
10 TABLET, FILM COATED ORAL ONCE AS NEEDED
COMMUNITY
End: 2024-10-24

## 2024-10-24 RX ORDER — CYPROHEPTADINE HYDROCHLORIDE 4 MG/1
4 TABLET ORAL
Qty: 30 TABLET | Refills: 3 | Status: SHIPPED | OUTPATIENT
Start: 2024-10-24

## 2024-10-24 NOTE — PROGRESS NOTES
Review of Systems   Constitutional:  Negative for appetite change, fatigue and fever.   HENT: Negative.  Negative for hearing loss, tinnitus, trouble swallowing and voice change.    Eyes:  Positive for photophobia and visual disturbance (blurry). Negative for pain.   Respiratory: Negative.  Negative for shortness of breath.    Cardiovascular: Negative.  Negative for palpitations.   Gastrointestinal:  Positive for nausea. Negative for vomiting.   Endocrine: Negative.  Negative for cold intolerance.   Genitourinary: Negative.  Negative for dysuria, frequency and urgency.   Musculoskeletal:  Negative for back pain, gait problem, myalgias, neck pain and neck stiffness.   Skin: Negative.  Negative for rash.   Allergic/Immunologic: Negative.    Neurological:  Positive for dizziness and headaches (everyday, all day,). Negative for tremors, seizures, syncope, facial asymmetry, speech difficulty, weakness, light-headedness and numbness.   Hematological: Negative.  Does not bruise/bleed easily.   Psychiatric/Behavioral: Negative.  Negative for confusion, hallucinations and sleep disturbance.
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
## 2024-10-24 NOTE — ASSESSMENT & PLAN NOTE
- Patient noted that both siblings had history of cerebral aneurysm, she states that 1 required further intervention  Orders:    MRA head w wo contrast; Future    BUN; Future    Creatinine, serum; Future

## 2024-10-24 NOTE — ASSESSMENT & PLAN NOTE
I had the pleasure of seeing Luís today in the office at [facility] in [place].  She is presenting today for an initial new patient consultation in regards to her headaches.  She states that she started having headaches around the age of her teenage years.  Started to get much worse over the last 2 years.  She notes as of late, has had 30/30 days in the month with a headache.  About 10/30 days or more severe debilitating headaches.  She notes that a lot of times having early morning headaches.  Noting continuous, headaches around the clock.  Noting that rizatriptan that she received from her primary care provider works for abortive therapy and also takes Excedrin Migraine as well.  A lot of times she will use Excedrin Migraine after the prescription medication.  A lot of times potentially taking Excedrin on daily basis.  Patient notes no significant aura associated with headaches.  Notes a bilateral temporal or frontal area pressure pain, and sometimes more of a pounding or stabbing pain when the headache is severe enough.  She does note symptoms of nausea, photophobia, phonophobia, osmophobia, blurred vision out of the left eye, lightheadedness/dizziness, and rarely tinnitus with the headaches.  Coughing, sneezing, bending over and exercise can all make the headache worse.  Laying down to standing up position can make the headache worse but never triggering the headache.  She has never seen neurology in the past.  She had a CT head without contrast on 10/27/2023.  Not currently taking any daily preventative medications.    Based on my evaluation, certainly seems that the patient is having chronic migraine without aura and without status migrainosus.  At this time, advised the patient that since she had never had an MRI brain with and without contrast before that I would recommend moving forward in the future.  Recommend to the patient that she have an MRI brain with and without contrast and  also an MRA of the head with and without contrast.  It was noted by the patient that she does have a significant family history of 2 of her siblings having cerebral aneurysm.  Therefore, would like for the patient to also be evaluated for cerebral aneurysm that she is already going to be receiving MRI brain.  Only concerning red flag sign associated with the headache is that the patient wakes up with them.  However, receiving imaging to rule out intracranial abnormality.  Also could be contributed due to the patient's lack of sleep throughout the night and we can certainly see if we have to evaluate for sleep apnea in the future.  Medication wise, patient notes that she is working on trying to become pregnant at this time so we are going to have to try something that will be safe in pregnancy as well.  For preventative therapy, patient opted to start cyproheptadine 4 mg daily at bedtime for migraine/headache preventative therapy.  For abortive therapy, certainly fine if the patient continues Maxalt for the time being but if she does become pregnant we will have to switch this in the future.  Added on a prescription of Reglan 10 mg to take as needed every 6 hours for nausea/vomiting associated with the migraine headaches and also to combine with Maxalt as well.  Advised the patient that she could also continue to take the Reglan in pregnancy as well.  Did talk to the patient in lengthy detail about medication overuse headaches and encouraged her to probably cut down on the amount of over-the-counter medication Excedrin Migraine she was using throughout the week.    - MRI brain with and without contrast to rule out acute intracranial abnormality, MRA head with and without contrast to rule out cerebral aneurysm.  BUN and creatinine ordered, should complete lab work about 2 to 3 days prior to imaging.  - Start cyproheptadine 4 mg, take 1 tablet by mouth once daily at bedtime for migraine prevention.  - Start Reglan 10  mg, take 1 tablet by mouth every 6 hours as needed for nausea/vomiting with headaches.  Encouraged the patient to combine this with Maxalt 10 mg as needed for migraine abortive therapy.      Orders:    MRI brain with and without contrast; Future    BUN; Future    Creatinine, serum; Future    metoclopramide (Reglan) 10 mg tablet; Take 1 tablet (10 mg total) by mouth every 6 (six) hours as needed (nausea/vomiting with headaches)    cyproheptadine (PERIACTIN) 4 mg tablet; Take 1 tablet (4 mg total) by mouth daily at bedtime

## 2024-10-24 NOTE — PATIENT INSTRUCTIONS
Additional Testing:   Neurodiagnostic workup:  MRI Brain with and without contrast ordered and MRA head with and without contrast ordered.  Complete BUN and creatinine lab work prior to MRI brain with and without contrast.    Headache Calendar  Please maintain a headache calendar  Consider using phone applications such as Migraine Buddy or Migraine Diary    Headache/migraine treatment:     Rescue medications (for immediate treatment of a headache):   It is ok to take ibuprofen, acetaminophen or naproxen (Advil, Tylenol,  Aleve, Excedrin) if they help your headaches you should limit these to No more than 3 times a week to avoid medication overuse/rebound headaches.     For your more moderate to severe migraines take this medication early   - Continue Maxalt (rizatriptan) 10mg tabs - take one at the onset of headache. May repeat one time after 2 hours if pain has not resolved.   (Max 2 a day and 10 a month)     - Start Reglan 10 mg, take 1 tablet by mouth once every 6 hours as needed for nausea/vomiting associated migraine headaches.  Encouraged the patient to take Reglan along with Maxalt at the onset of migraine headache.    -Talk to the patient about medication overuse headaches and explained that we may need to try to potentially cut down on taking Excedrin throughout the week.  Explained to the patient that ideally we would like to limit over-the-counter medication usage to no more than 2 to 3 days out of the week    Prescription preventive medications for headaches/migraines   (to take every day to help prevent headaches - not to take at the time of headache):  - Start Cyproheptadine 4 mg, take 1 tablet by mouth once daily at bedtime for migraine prevention    *Typically these types of medications take time until you see the benefit, although some may see improvement in days, often it may take weeks, especially if the medication is being titrated up to a beneficial level. Please contact us if there are any  concerns or questions regarding the medication.     Over the counter preventive supplements for headaches/migraines (if you try, try for 3 months straight)  (to take every day to help prevent headaches - not to take at the time of headache):  There are combo pills online of these - none of which regulated by FDA and double check dosing - take appropriate dose only once a day- prevent a migraine, migravent, mind ease, migrelief   [] Magnesium 400mg daily (If any diarrhea or upset stomach, decrease dose  as tolerated)  [] Riboflavin (Vitamin B2) 400mg daily (may make your urine bright/neon yellow)  - All supplements can be purchased online     Lifestyle Recommendations:  [x] SLEEP - Maintain a regular sleep schedule: Adults need at least 7-8 hours of uninterrupted a night. Maintain good sleep hygiene:  Going to bed and waking up at consistent times, avoiding excessive daytime naps, avoiding caffeinated beverages in the evening, avoid excessive stimulation in the evening and generally using bed primarily for sleeping.  One hour before bedtime would recommend turning lights down lower, decreasing your activity (may read quietly, listen to music at a low volume). When you get into bed, should eliminate all technology (no texting, emailing, playing with your phone, iPad or tablet in bed).  [x] HYDRATION - Maintain good hydration.  Drink  2L of fluid a day (4 typical small water bottles)  [x] DIET - Maintain good nutrition. In particular don't skip meals and try and eat healthy balanced meals regularly.  [x] TRIGGERS - Look for other triggers and avoid them: Limit caffeine to 1-2 cups a day or less. Avoid dietary triggers that you have noticed bring on your headaches (this could include aged cheese, peanuts, MSG, aspartame and nitrates).  [x] EXERCISE - physical exercise as we all know is good for you in many ways, and not only is good for your heart, but also is beneficial for your mental health, cognitive health and   chronic pain/headaches. I would encourage at the least 5 days of physical exercise weekly for at least 30 minutes.     Education and Follow-up  [x] Please call with any questions or concerns. Of course if any new concerning symptoms go to the emergency department.  [x] Follow up in 4 months with Musa MEJIA

## 2024-11-05 ENCOUNTER — TELEPHONE (OUTPATIENT)
Age: 29
End: 2024-11-05

## 2024-11-05 NOTE — TELEPHONE ENCOUNTER
Pt presented in person regarding missing her depo injection in September due to trying to conceive - she states she has not gotten her menstrual cycle since June when she had her last depo injection and states she has done a pregnancy test at home and it is negative    pt concerned because both breasts hurt/feel heavy and full   more so when bra is off.   Please assist

## 2024-11-05 NOTE — TELEPHONE ENCOUNTER
Message from shelia alfaro left on patient's vm with a message to call back with any further questions

## 2024-11-21 ENCOUNTER — TELEPHONE (OUTPATIENT)
Age: 29
End: 2024-11-21

## 2024-11-21 ENCOUNTER — OFFICE VISIT (OUTPATIENT)
Age: 29
End: 2024-11-21
Payer: COMMERCIAL

## 2024-11-21 VITALS
HEART RATE: 97 BPM | WEIGHT: 175.2 LBS | OXYGEN SATURATION: 97 % | TEMPERATURE: 96.6 F | HEIGHT: 65 IN | BODY MASS INDEX: 29.19 KG/M2 | DIASTOLIC BLOOD PRESSURE: 72 MMHG | RESPIRATION RATE: 18 BRPM | SYSTOLIC BLOOD PRESSURE: 116 MMHG

## 2024-11-21 DIAGNOSIS — Z91.410 HISTORY OF DOMESTIC PHYSICAL ABUSE IN ADULT: ICD-10-CM

## 2024-11-21 DIAGNOSIS — F41.9 ANXIETY AND DEPRESSION: Primary | ICD-10-CM

## 2024-11-21 DIAGNOSIS — F32.A ANXIETY AND DEPRESSION: Primary | ICD-10-CM

## 2024-11-21 PROCEDURE — 99213 OFFICE O/P EST LOW 20 MIN: CPT

## 2024-11-21 RX ORDER — ESCITALOPRAM OXALATE 5 MG/1
5 TABLET ORAL DAILY
Qty: 90 TABLET | Refills: 3 | Status: SHIPPED | OUTPATIENT
Start: 2024-11-21
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
## 2024-11-21 NOTE — ASSESSMENT & PLAN NOTE
PHQ-9 score of 12 and DL-7 score of 18.   Recommend starting Lexapro 5 mg daily for anxiety and depression.  Benefits, risk, and common side effects discussed.  Referral to therapy placed and I also provided the patient with information regarding [email or web address] [email or web address] as an alternative to look for therapy.  Information about the national crisis line provided.  Recommend she follow-up in the office in 1 month and sooner as needed.    Depression Screening Follow-up Plan: Patient's depression screening was positive with a PHQ-2 score of 4. Their PHQ-9 score was 12. Patient assessed for underlying major depression. They have no active suicidal ideations. Brief counseling provided and recommend additional follow-up/re-evaluation next office visit.    Orders:    Ambulatory referral to Psych Services; Future    escitalopram (LEXAPRO) 5 mg tablet; Take 1 tablet (5 mg total) by mouth daily

## 2024-11-21 NOTE — PATIENT INSTRUCTIONS
Visit [email or web address] to search for a licensed therapist, psychologist, or psychiatrist to meet your needs.   You can narrow your search for a provider based on:   - In person or online appointments  - Location  - Type of counseling or services needed  - Insurance coverage  - Language  - etc.   I recommend utilizing this tool if there is an extensive wait list for mental health services through our [facility] psychology referral and if services are desired or recommended to be started sooner than we have availability.   If you feel that you are in crisis and have any thoughts of harming yourself or others, please call phone #[phone], the National Crisis Lifeline, which is a 24/7 hotline that will connect you to a trained crisis response professional.
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
## 2024-12-06 ENCOUNTER — TELEPHONE (OUTPATIENT)
Age: 29
End: 2024-12-06

## 2024-12-06 NOTE — TELEPHONE ENCOUNTER
Contacted patient in regards to Routine Referral in attempts to verify patient's needs of services and add patient to proper wait list. spoke with patient whom stated is interested in being added to [facility]. Writer verified information and added to wait list.     Closing referral.

## 2024-12-13 ENCOUNTER — TELEPHONE (OUTPATIENT)
Dept: NEUROLOGY | Facility: CLINIC | Age: 29
End: 2024-12-13

## 2024-12-13 NOTE — TELEPHONE ENCOUNTER
Called in regards to her upcoming appointment with bella. As the provider will be out of office and we will need to r/s. Pt accepted a new appointment on  3/4/25 @ 12:30.

## 2024-12-19 ENCOUNTER — OFFICE VISIT (OUTPATIENT)
Age: 29
End: 2024-12-19
Payer: COMMERCIAL

## 2024-12-19 VITALS
SYSTOLIC BLOOD PRESSURE: 121 MMHG | WEIGHT: 175 LBS | BODY MASS INDEX: 29.12 KG/M2 | OXYGEN SATURATION: 98 % | RESPIRATION RATE: 18 BRPM | DIASTOLIC BLOOD PRESSURE: 64 MMHG | HEART RATE: 92 BPM | TEMPERATURE: 97.5 F

## 2024-12-19 DIAGNOSIS — L81.8 DISORDER OF SKIN DUE TO TATTOO INK: Primary | ICD-10-CM

## 2024-12-19 DIAGNOSIS — L98.9 DISORDER OF SKIN DUE TO TATTOO INK: Primary | ICD-10-CM

## 2024-12-19 PROCEDURE — 99214 OFFICE O/P EST MOD 30 MIN: CPT | Performed by: PHYSICIAN ASSISTANT

## 2024-12-19 PROCEDURE — S9088 SERVICES PROVIDED IN URGENT: HCPCS | Performed by: PHYSICIAN ASSISTANT

## 2024-12-19 RX ORDER — IBUPROFEN 200 MG
200 TABLET ORAL EVERY 6 HOURS PRN
Start: 2024-12-19

## 2024-12-19 RX ORDER — CEPHALEXIN 500 MG/1
500 CAPSULE ORAL EVERY 8 HOURS SCHEDULED
Qty: 15 CAPSULE | Refills: 0 | Status: SHIPPED | OUTPATIENT
Start: 2024-12-19 | End: 2024-12-24

## 2024-12-19 NOTE — PATIENT INSTRUCTIONS
Patient Education     Tattoos and body piercings   The Basics   Written by the doctors and editors at [facility]   How are tattoos done? -- Tattoos are done with needles that inject dyes directly under the skin. The needles carias the skin over and over again until the tattoo is completely drawn. Tattoo artists do not use anesthesia, so anyone having a tattoo must withstand the pain of the needles. The dyes that tattoo artists use are not regulated by the government. They can contain salts, metals, and lots of different chemicals. People sometimes have a bad reaction to these dyes.  How are body piercings done? -- Ear piercings can be done using spring-loaded piercing guns that shoot a stud through the ear. Most other piercings are done with a hollow needle. Usually, the person doing the piercing will put the needle through the desired body part to make the hole. Then they will insert a piece of jewelry to keep the hole open.  Can tattoos or body piercings cause medical problems? -- Yes. Each type of body art can cause its own set of problems.  People who get tattoos can get infections, including some serious infections, such as hepatitis B or C (which cause liver disease). It also might be possible for them to get HIV (which causes AIDS), but so far this has not happened. Infections can happen if tattoo or piercing artists do not use disposable needles, or do not fully clean and sterilize reusable needles between uses. If you get a tattoo or piercing and the needles have not been cleaned well, the blood of the people who got tattoos or piercings before you can get injected under your skin.  People who get tattoos can also have allergic reactions to the tattoo dyes. If they ever have a test called an MRI (which creates pictures of the inside of the body), they can also have skin pain or burning during the test. That's because tattoo dyes sometimes contain metals, which heat up or move around while inside the MRI  machine.  People who have body piercings can get infections either in the skin near the piercing, or infections that affect the whole body. They can also develop dental problems if they have a piercing in the tongue or lips.  Is there any way to reduce the risks of tattoos and piercings? -- Yes. If you are sure you want to get a tattoo or a body piercing, here are some things you can do to reduce the risks:   Visit a few tattoo or piercing salons and watch the artists at work. That way you can see what's involved and whether the artists wash their hands and take other steps to prevent infection. (The steps you should look for are described below.) Choose a salon that has been in business for a while. Do not go to stands at [employer], flea markets, [employer], or other temporary locations.   Keep in mind that people who are younger than 18 years old might need permission from their parents to get a tattoo or piercing. Most salons do not accept a parent's or caregiver's note as proof that the parent approves of the tattoo or piercing. Instead, they want the parent to come to the salon with their child. If you are younger than 18, it's actually a good idea to bring your parent along. That way, they can make sure that the tattoo or piercing is done as safely as possible.   Talk to other people who have had what you want done. Find out what it was like for them and ask if they have any regrets. Remember, there's no reason to do anything in a hurry. A tattoo or piercing is something you will have to live with for the rest of your life. Take your time deciding what you want to do. Whatever you do, do not get a tattoo or piercing when you are drunk or high. Also, do not get a tattoo or piercing while you are pregnant.   Make sure you think carefully about your choice of tattoo or piercing and its location. You might want to be able to hide your body art in certain situations.   Remember that a tattoo or piercing can cause  serious pain. You will not get anesthesia while it is being done.   If you are getting a tattoo, make sure the :   Washes their hands with soap and water and then puts on gloves   Uses sterile water (usually in specially marked bottles), not tap water, to rinse the equipment or the area to be tattooed   Wipes the area to be tattooed with alcohol or iodine   Uses disposable cups to hold the dyes for the tattoo   Uses sterile needles (in individually wrapped packages that are opened in front of you)  What should I do after getting a tattoo or piercing? -- The most important thing to do is to keep the area that was tattooed or pierced clean. Use soap and water to wash the area every day, but try not to touch it at other times until it heals.  If you got a piercing, do not use the special solutions that are sold in shopping malls and other places to clean piercings. These solutions are not good at getting rid of many bacteria, and they can even carry their own bacteria. Soap and water is a better choice.  Should I see a doctor or nurse? -- See your doctor or nurse right away if the body part that was tattooed or pierced swells, turns red or purple, feels hot, or starts to ooze pus or smell bad. You should also see a doctor or nurse if you get a fever.  What if I want to have my tattoo removed? -- It is possible to have a tattoo removed with special lasers. But the process takes several visits, and it can cause problems of its own. It can cause the skin to change color or to get irritated or swollen. Plus, it can be expensive - usually much more expensive than getting a tattoo in the first place.  All topics are updated as new evidence becomes available and our peer review process is complete.  This topic retrieved from [employer] on: Feb 26, 2024.  Topic 12144 Version 10.0  Release: 32.2.4 - C32.56  © 2024 UpToDate, Inc. and/or its affiliates. All rights reserved.  Consumer Information Use and Disclaimer    Disclaimer: This generalized information is a limited summary of diagnosis, treatment, and/or medication information. It is not meant to be comprehensive and should be used as a tool to help the user understand and/or assess potential diagnostic and treatment options. It does NOT include all information about conditions, treatments, medications, side effects, or risks that may apply to a specific patient. It is not intended to be medical advice or a substitute for the medical advice, diagnosis, or treatment of a health care provider based on the health care provider's examination and assessment of a patient's specific and unique circumstances. Patients must speak with a health care provider for complete information about their health, medical questions, and treatment options, including any risks or benefits regarding use of medications. This information does not endorse any treatments or medications as safe, effective, or approved for treating a specific patient. UpToDate, Inc. and its affiliates disclaim any warranty or liability relating to this information or the use thereof.The use of this information is governed by the Terms of Use, available at https://www.woltersRevertuwer.com/en/know/clinical-effectiveness-terms. 2024© UpToDate, Inc. and its affiliates and/or licensors. All rights reserved.  Copyright   © 2024 UpToDate, Inc. and/or its affiliates. All rights reserved.
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
## 2024-12-30 ENCOUNTER — TELEPHONE (OUTPATIENT)
Age: 29
End: 2024-12-30

## 2024-12-30 DIAGNOSIS — N91.2 AMENORRHEA: Primary | ICD-10-CM

## 2024-12-30 NOTE — TELEPHONE ENCOUNTER
Outgoing call to patient. Informed of providers response. Patient verbalized understanding.     Patient would like hcg drawn. HCG ordered per providers response.   
Pt called in stating she was on the depo, last injection was June 2024. Got a normal cycle on 11/11/24, lasted 7 days. Nothing for two days and then had a day of spotting. Did have spotting for 3 days in December as well. Pt took multiple pregnancy tests about one week ago, some were negative and some had a faint line. Pt is requesting appointment/blood work. Advised to take another home pregnancy test and will review with provider regarding labs. Pt thankful.   
Temples.../Clavicles.../Shoulders.../Thigh...

## 2025-01-02 ENCOUNTER — APPOINTMENT (OUTPATIENT)
Age: 30
End: 2025-01-02
Payer: COMMERCIAL

## 2025-01-02 DIAGNOSIS — G43.709 CHRONIC MIGRAINE WITHOUT AURA WITHOUT STATUS MIGRAINOSUS, NOT INTRACTABLE: ICD-10-CM

## 2025-01-02 DIAGNOSIS — R51.9 CHRONIC DAILY HEADACHE: ICD-10-CM

## 2025-01-02 DIAGNOSIS — Z82.49 FAMILY HISTORY OF CEREBRAL ANEURYSM: ICD-10-CM

## 2025-01-02 DIAGNOSIS — N91.2 AMENORRHEA: ICD-10-CM

## 2025-01-02 LAB
B-HCG SERPL-ACNC: <0.6 MIU/ML (ref 0–5)
BUN SERPL-MCNC: 12 MG/DL (ref 5–25)
CREAT SERPL-MCNC: 0.69 MG/DL (ref 0.6–1.3)
GFR SERPL CREATININE-BSD FRML MDRD: 116 ML/MIN/1.73SQ M

## 2025-01-02 PROCEDURE — 84520 ASSAY OF UREA NITROGEN: CPT

## 2025-01-02 PROCEDURE — 84702 CHORIONIC GONADOTROPIN TEST: CPT

## 2025-01-02 PROCEDURE — 82565 ASSAY OF CREATININE: CPT

## 2025-01-02 PROCEDURE — 36415 COLL VENOUS BLD VENIPUNCTURE: CPT

## 2025-01-06 ENCOUNTER — RESULTS FOLLOW-UP (OUTPATIENT)
Age: 30
End: 2025-01-06

## 2025-01-29 ENCOUNTER — OFFICE VISIT (OUTPATIENT)
Age: 30
End: 2025-01-29
Payer: COMMERCIAL

## 2025-01-29 VITALS
DIASTOLIC BLOOD PRESSURE: 62 MMHG | TEMPERATURE: 98 F | WEIGHT: 182.4 LBS | BODY MASS INDEX: 30.39 KG/M2 | OXYGEN SATURATION: 95 % | SYSTOLIC BLOOD PRESSURE: 118 MMHG | HEIGHT: 65 IN | HEART RATE: 94 BPM

## 2025-01-29 DIAGNOSIS — F41.9 ANXIETY AND DEPRESSION: ICD-10-CM

## 2025-01-29 DIAGNOSIS — K76.9 LIVER LESION: Primary | ICD-10-CM

## 2025-01-29 DIAGNOSIS — F32.A ANXIETY AND DEPRESSION: ICD-10-CM

## 2025-01-29 DIAGNOSIS — G43.E09 CHRONIC MIGRAINE WITH AURA WITHOUT STATUS MIGRAINOSUS, NOT INTRACTABLE: ICD-10-CM

## 2025-01-29 DIAGNOSIS — N83.202 CYST OF LEFT OVARY: ICD-10-CM

## 2025-01-29 PROCEDURE — 99214 OFFICE O/P EST MOD 30 MIN: CPT

## 2025-01-29 NOTE — PROGRESS NOTES
Name: Luís Han      : 1995      MRN: [id number]  Encounter Provider: Sun Sung PA-C  Encounter Date: 2025   Encounter department: [facility]  :  Assessment & Plan  Liver lesion  ER note and imaging study results reviewed.  CAT scan showed evidence of left ovarian cyst and also indeterminant hypodense lesion of the liver, likely hemangioma.  It was recommended to have further evaluation of the liver lesion with multiphasic CT or MRI.  Discussed this finding and the recommendation with the patient and recommend MRI imaging for further evaluation.  Patient is agreeable.  Patient denies any past reaction to oral or IV contrast.  She does not have any metal implants in her body.  I will follow-up once MRI is resulted.  Orders:  •  MRI abdomen w wo contrast; Future    Cyst of left ovary  6 cm cyst of the left ovary discovered on CT scan during ER visit yesterday.  She has an appointment with OB/GYN next week to discuss removal.  She was given a prescription of tramadol at the ER to take as needed for pain.  Recommend limiting use and to take only as needed for severe pain.  Patient may also try taking Tylenol, ibuprofen, or using a heating pad recommended that time to reduce pain.  Abdominal pain worsens, recommend returning to the ER.       Anxiety and depression  Continue Lexapro 5 mg daily  Depression Screening Follow-up Plan: Patient's depression screening was positive with a PHQ-9 score of 6. Patient with underlying depression and was advised to continue current medications as prescribed.         Chronic migraine with aura without status migrainosus, not intractable  Follows with neurology who prescribed cyproheptadine to be taken at nighttime.  Patient will also take rizatriptan as needed.  Continue follow-up with neurology.              History of Present Illness   Luís Han is a 29-year-old female presenting to the office for follow-up after ER visit yesterday  "for abdominal pain.  She was diagnosed with a enlarging left ovarian cyst.  She was diagnosed with a left ovarian cyst last year, but it was small and she was told that it would resolve.  Recently her abdominal pain became severe, which is when she went to the ER.  She was treated in the ER with tramadol and morphine and sent home with a prescription of tramadol to take as needed for severe pain.  It was recommended that she have the ovarian cyst removed.  She has a consultation with gynecology next week to discuss the cyst and possible removal.  She denies any vomiting, but has been having occasional nausea.  Her appetite has been normal.  She still suffers with migraines, but has seen neurology who started her on cyproheptadine.  She will still take the rizatriptan occasionally as needed.  Her migraine headache pain and abdominal pain is currently rated as 7/10.      Review of Systems   Constitutional:  Negative for chills and fever.   HENT:  Negative for congestion, ear pain and sore throat.    Eyes:  Negative for pain and visual disturbance.   Respiratory:  Negative for cough and shortness of breath.    Cardiovascular:  Negative for chest pain and palpitations.   Gastrointestinal:  Positive for abdominal pain. Negative for constipation, diarrhea and vomiting.   Genitourinary:  Negative for dysuria and hematuria.   Musculoskeletal:  Negative for arthralgias, back pain and myalgias.   Skin:  Negative for color change and rash.   Neurological:  Positive for headaches. Negative for dizziness, seizures and syncope.   All other systems reviewed and are negative.      Objective   /62 (BP Location: Left arm)   Pulse 94   Temp 98 °F (36.7 °C) (Tympanic)   Ht 5' 5\" (1.651 m)   Wt 82.7 kg (182 lb 6.4 oz)   SpO2 95%   BMI 30.35 kg/m²      Physical Exam  Vitals and nursing note reviewed.   Constitutional:       General: She is not in acute distress.     Appearance: She is well-developed.   Eyes:      Extraocular " Movements: Extraocular movements intact.      Conjunctiva/sclera: Conjunctivae normal.   Cardiovascular:      Rate and Rhythm: Normal rate and regular rhythm.      Heart sounds: Normal heart sounds. No murmur heard.     No friction rub. No gallop.   Pulmonary:      Effort: Pulmonary effort is normal. No respiratory distress.      Breath sounds: Normal breath sounds. No wheezing, rhonchi or rales.   Abdominal:      Tenderness: There is generalized abdominal tenderness and tenderness in the left upper quadrant and left lower quadrant.   Musculoskeletal:         General: No swelling.   Skin:     General: Skin is warm and dry.      Capillary Refill: Capillary refill takes less than 2 seconds.   Neurological:      General: No focal deficit present.      Mental Status: She is alert. Mental status is at baseline.   Psychiatric:         Mood and Affect: Mood normal.         Behavior: Behavior normal.

## 2025-01-29 NOTE — PATIENT INSTRUCTIONS
Please call [facility]'s Central Scheduling at [phone] to schedule your imaging study / appointment / etc.

## 2025-01-29 NOTE — ASSESSMENT & PLAN NOTE
Follows with neurology who prescribed cyproheptadine to be taken at nighttime.  Patient will also take rizatriptan as needed.  Continue follow-up with neurology.

## 2025-01-29 NOTE — ASSESSMENT & PLAN NOTE
Continue Lexapro 5 mg daily  Depression Screening Follow-up Plan: Patient's depression screening was positive with a PHQ-9 score of 6. Patient with underlying depression and was advised to continue current medications as prescribed.

## 2025-02-06 ENCOUNTER — OFFICE VISIT (OUTPATIENT)
Age: 30
End: 2025-02-06
Payer: COMMERCIAL

## 2025-02-06 VITALS
BODY MASS INDEX: 30.16 KG/M2 | HEIGHT: 65 IN | SYSTOLIC BLOOD PRESSURE: 100 MMHG | WEIGHT: 181 LBS | DIASTOLIC BLOOD PRESSURE: 74 MMHG

## 2025-02-06 DIAGNOSIS — N83.202 LEFT OVARIAN CYST: Primary | ICD-10-CM

## 2025-02-06 PROBLEM — R68.89 FLU-LIKE SYMPTOMS: Status: RESOLVED | Noted: 2024-04-21 | Resolved: 2025-02-06

## 2025-02-06 PROCEDURE — 99214 OFFICE O/P EST MOD 30 MIN: CPT | Performed by: STUDENT IN AN ORGANIZED HEALTH CARE EDUCATION/TRAINING PROGRAM

## 2025-02-06 NOTE — PROGRESS NOTES
"Name: Luís Han      : 1995      MRN: [id number]  Encounter Provider: Faith Doherty MD  Encounter Date: 2025   Encounter department: [facility]OLOG[facility]  :  Assessment & Plan  Left ovarian cyst  Reviewed ultrasound now and previously with patient. Reviewed very possible that cyst was related to ovulation. Reviewed management options, to include expectant management, expectant management with ovarian suppression, and surgical intervention. Discussed that ovarian suppression is generally through contraceptive methods, which would not allow for her pregnancy goals. Recommend repeat TVUS, as I cannot see images to further explain what may be the causative etiology. Also recommend repeat to assess for growth/resolution of ovarian cyst. Pt agrees to plan of care    Orders:    US pelvis complete w transvaginal; Future      History of Present Illness   HPI  Luís Han is a 29 y.o. female who presents for follow up after ED visit, where she was diagnosed with at 5.5cm left ovarian cyst. She has suffered from pelvic pain intermittently for some time, feels that recurrent cysts are the likely cause. The pain became severe, so she presented to the ED. LMP was appx 2 weeks prior to that ED visit. Pain somewhat improved since then.          Review of Systems as above        Objective   /74 (BP Location: Left arm, Patient Position: Sitting, Cuff Size: Standard)   Ht 5' 5\" (1.651 m)   Wt 82.1 kg (181 lb)   LMP 2025 (Exact Date)   BMI 30.12 kg/m²      Physical Exam  Constitutional:       Appearance: She is well-developed.   HENT:      Head: Normocephalic and atraumatic.   Eyes:      Pupils: Pupils are equal, round, and reactive to light.   Pulmonary:      Effort: Pulmonary effort is normal.   Musculoskeletal:      Cervical back: Normal range of motion.   Neurological:      Mental Status: She is alert and oriented to person, place, and time. " "  Psychiatric:         Behavior: Behavior normal.         [facility] records from 1/29/25 ED visit:   -Ultrasound with \"5.8 x 5 x 5.6 cm some echogenic debris in left ovary\"   -CT \" Large amount of stool in the colon consistent with fecal impaction. Normal   appendix.   2. 6 x 6 x 5 cm cyst in the left ovary. 1.7 x 1 cm involuting cyst in the right   ovary. Trace free fluid in the pelvis.   3. 1.8 cm indeterminate hypodense lesion likely hemangioma. This can be further   evaluated with follow-up multiphasic CT or MRI.\"    Prior TVUS from [facility] reviewed   - \"Right ovary: 3.3 x 1.8 x 2.1 cm. 6.6 mL.  Left ovary: 5.1 x 3.5 x 1.9 cm. 18.0 mL. Involuting left corpus luteum cyst identified measuring 2.0 x 1.5 x 2.8 cm. Ovarian Doppler flow is within normal limits. No suspicious ovarian or adnexal abnormality\"      "

## 2025-02-11 ENCOUNTER — HOSPITAL ENCOUNTER (OUTPATIENT)
Dept: ULTRASOUND IMAGING | Facility: CLINIC | Age: 30
Discharge: HOME | End: 2025-02-11
Payer: COMMERCIAL

## 2025-02-11 DIAGNOSIS — N83.202 LEFT OVARIAN CYST: ICD-10-CM

## 2025-02-11 PROCEDURE — 76856 US EXAM PELVIC COMPLETE: CPT

## 2025-02-11 PROCEDURE — 76830 TRANSVAGINAL US NON-OB: CPT
